# Patient Record
Sex: MALE | Race: BLACK OR AFRICAN AMERICAN | NOT HISPANIC OR LATINO | Employment: OTHER | URBAN - METROPOLITAN AREA
[De-identification: names, ages, dates, MRNs, and addresses within clinical notes are randomized per-mention and may not be internally consistent; named-entity substitution may affect disease eponyms.]

---

## 2017-03-24 PROBLEM — N18.9 ANEMIA OF CHRONIC RENAL FAILURE: Status: ACTIVE | Noted: 2017-03-24

## 2017-03-24 PROBLEM — D63.1 ANEMIA OF CHRONIC RENAL FAILURE: Status: ACTIVE | Noted: 2017-03-24

## 2017-09-05 PROBLEM — N52.9 ERECTILE DYSFUNCTION: Status: ACTIVE | Noted: 2017-09-05

## 2017-11-03 DIAGNOSIS — N18.30 CHRONIC KIDNEY DISEASE, STAGE III (MODERATE): ICD-10-CM

## 2017-11-03 RX ORDER — NATEGLINIDE 60 MG/1
60 TABLET ORAL DAILY
Qty: 30 TABLET | Refills: 6 | Status: CANCELLED | OUTPATIENT
Start: 2017-11-03

## 2017-12-06 ENCOUNTER — OFFICE VISIT (OUTPATIENT)
Dept: URGENT CARE | Facility: CLINIC | Age: 77
End: 2017-12-06
Payer: MEDICARE

## 2017-12-06 VITALS
DIASTOLIC BLOOD PRESSURE: 75 MMHG | OXYGEN SATURATION: 98 % | HEIGHT: 66 IN | WEIGHT: 158 LBS | TEMPERATURE: 97 F | BODY MASS INDEX: 25.39 KG/M2 | SYSTOLIC BLOOD PRESSURE: 151 MMHG | HEART RATE: 79 BPM

## 2017-12-06 DIAGNOSIS — M54.50 ACUTE LOW BACK PAIN WITHOUT SCIATICA, UNSPECIFIED BACK PAIN LATERALITY: Primary | ICD-10-CM

## 2017-12-06 PROCEDURE — 99204 OFFICE O/P NEW MOD 45 MIN: CPT | Mod: S$GLB,,, | Performed by: FAMILY MEDICINE

## 2017-12-06 RX ORDER — MOMETASONE FUROATE 1 MG/G
CREAM TOPICAL
Qty: 45 G | Refills: 0 | Status: SHIPPED | OUTPATIENT
Start: 2017-12-06 | End: 2018-08-13

## 2017-12-06 RX ORDER — NAPROXEN 375 MG/1
375 TABLET ORAL 2 TIMES DAILY WITH MEALS
Qty: 20 TABLET | Refills: 0 | Status: ON HOLD | OUTPATIENT
Start: 2017-12-06 | End: 2018-02-03 | Stop reason: HOSPADM

## 2017-12-06 RX ORDER — TRAMADOL HYDROCHLORIDE 50 MG/1
50 TABLET ORAL EVERY 6 HOURS PRN
Qty: 20 TABLET | Refills: 0 | Status: SHIPPED | OUTPATIENT
Start: 2017-12-06 | End: 2018-03-19

## 2017-12-06 RX ORDER — CHLORZOXAZONE 500 MG/1
500 TABLET ORAL 4 TIMES DAILY PRN
Qty: 40 TABLET | Refills: 0 | Status: SHIPPED | OUTPATIENT
Start: 2017-12-06 | End: 2017-12-16

## 2017-12-06 RX ORDER — CINACALCET 30 MG/1
30 TABLET, FILM COATED ORAL
COMMUNITY
End: 2018-02-09 | Stop reason: SDUPTHER

## 2017-12-06 NOTE — PROGRESS NOTES
"Subjective:       Patient ID: Margaret Hernández Jr. is a 77 y.o. male.    Vitals:  height is 5' 6" (1.676 m) and weight is 71.7 kg (158 lb). His oral temperature is 97.4 °F (36.3 °C). His blood pressure is 151/75 (abnormal) and his pulse is 79. His oxygen saturation is 98%.     Chief Complaint: Back Pain    Back Pain   This is a new problem. The current episode started 1 to 4 weeks ago. The problem occurs intermittently. The problem is unchanged. The pain is present in the lumbar spine. The quality of the pain is described as shooting. Radiates to: pt states his pain started in his RLQ and is now in his back. The pain is at a severity of 10/10. The symptoms are aggravated by position. Associated symptoms include abdominal pain and weakness. Pertinent negatives include no chest pain, fever, headaches, leg pain or numbness. He has tried nothing for the symptoms.   Rash   This is a new problem. The current episode started more than 1 month ago. The problem has been gradually worsening since onset. The affected locations include the back and right ankle. It is unknown if there was an exposure to a precipitant. Pertinent negatives include no cough, diarrhea, fatigue, fever, joint pain, shortness of breath, sore throat or vomiting. Past treatments include nothing.     Review of Systems   Constitution: Positive for weakness. Negative for chills, fatigue and fever.   HENT: Negative for sore throat.    Eyes: Negative for blurred vision.   Cardiovascular: Negative for chest pain.   Respiratory: Negative for cough and shortness of breath.    Skin: Positive for itching and rash.   Musculoskeletal: Positive for back pain. Negative for joint pain.   Gastrointestinal: Positive for abdominal pain. Negative for diarrhea, nausea and vomiting.   Neurological: Negative for headaches and numbness.   Psychiatric/Behavioral: The patient is not nervous/anxious.        Objective:      Physical Exam   Constitutional: He is oriented to person, " place, and time. Vital signs are normal. He appears well-developed and well-nourished. He is active and cooperative. No distress.   HENT:   Head: Normocephalic and atraumatic.   Nose: Nose normal.   Mouth/Throat: Oropharynx is clear and moist and mucous membranes are normal.   Eyes: Conjunctivae and lids are normal.   Neck: Trachea normal, normal range of motion, full passive range of motion without pain and phonation normal. Neck supple.   Cardiovascular: Normal rate, regular rhythm, normal heart sounds, intact distal pulses and normal pulses.    Pulmonary/Chest: Effort normal and breath sounds normal.   Abdominal: Soft. Normal appearance and bowel sounds are normal. He exhibits no abdominal bruit, no pulsatile midline mass and no mass.   Musculoskeletal: He exhibits no edema or deformity.        Lumbar back: He exhibits decreased range of motion, tenderness and pain.        Back:    Neurological: He is alert and oriented to person, place, and time. He has normal strength and normal reflexes. No sensory deficit.   Skin: Skin is warm, dry and intact. He is not diaphoretic.   Psychiatric: He has a normal mood and affect. His speech is normal and behavior is normal. Judgment and thought content normal. Cognition and memory are normal.   Nursing note and vitals reviewed.    Type of Interpretation: ED Physician (Independently Interpreted).  Radiology Procedure Done: Lumbar Spine X-Ray.  Interpretation: Normal LS films.      Assessment:       1. Acute low back pain without sciatica, unspecified back pain laterality        Plan:         Acute low back pain without sciatica, unspecified back pain laterality  -     X-Ray Lumbar Spine 2 Or 3 Views; Future; Expected date: 12/06/2017    Other orders  -     naproxen (NAPROSYN) 375 MG tablet; Take 1 tablet (375 mg total) by mouth 2 (two) times daily with meals.  Dispense: 20 tablet; Refill: 0  -     chlorzoxazone (PARAFON FORTE) 500 mg Tab; Take 1 tablet (500 mg total) by mouth  4 (four) times daily as needed.  Dispense: 40 tablet; Refill: 0  -     traMADol (ULTRAM) 50 mg tablet; Take 1 tablet (50 mg total) by mouth every 6 (six) hours as needed for Pain.  Dispense: 20 tablet; Refill: 0      Please drink plenty of fluids.  Please get plenty of rest.  Please return here or go to the Emergency Department for any concerns or worsening of condition.  If you were prescribed a narcotic medication, do not drive or operate heavy equipment or machinery while taking these medications.  If you were not prescribed an anti-inflammatory medication, and if you do not have any history of stomach/intestinal ulcers, or kidney disease, or are not taking a blood thinner such as Coumadin, Plavix, Pradaxa, Eloquis, or Xaralta for example, it is OK to take over the counter Ibuprofen or Advil or Motrin or Aleve as directed.  Do not take these medications on an empty stomach.  If you lose control of your bowel and/or bladder, please go to the nearest Emergency Department immediately.  If you lose sensation in between your legs by your genitalia and/or rectum, please go to the nearest Emergency Department immediately.  If you lose control or sensation of any extremity, please go to the nearest Emergency Department immediately.    Moist heat (heating Pad) several times a day to back for relief and comfort.  If you  smoke, please stop smoking.    Please follow up with your primary care doctor or specialist as needed.  Stephanie Hernandez NP  [phone]

## 2017-12-06 NOTE — PATIENT INSTRUCTIONS
Please drink plenty of fluids.  Please get plenty of rest.  Please return here or go to the Emergency Department for any concerns or worsening of condition.  If you were prescribed a narcotic medication, do not drive or operate heavy equipment or machinery while taking these medications.  If you were not prescribed an anti-inflammatory medication, and if you do not have any history of stomach/intestinal ulcers, or kidney disease, or are not taking a blood thinner such as Coumadin, Plavix, Pradaxa, Eloquis, or Xaralta for example, it is OK to take over the counter Ibuprofen or Advil or Motrin or Aleve as directed.  Do not take these medications on an empty stomach.  If you lose control of your bowel and/or bladder, please go to the nearest Emergency Department immediately.  If you lose sensation in between your legs by your genitalia and/or rectum, please go to the nearest Emergency Department immediately.  If you lose control or sensation of any extremity, please go to the nearest Emergency Department immediately.    Moist heat (heating Pad) several times a day to back for relief and comfort.  If you  smoke, please stop smoking.    Please follow up with your primary care doctor or specialist as needed.  Stephanie Hernandez, NP  [phone]      General Neck and Back Pain    Both neck and back pain are usually caused by injury to the muscles or ligaments of the spine. Sometimes the disks that separate each bone of the spine may cause pain by pressing on a nearby nerve. Back and neck pain may appear after a sudden twisting or bending force (such as in a car accident), or sometimes after a simple awkward movement. In either case, muscle spasm is often present and adds to the pain.  Acute neck and back pain usually gets better in 1 to 2 weeks. Pain related to disk disease, arthritis in the spinal joints or spinal stenosis (narrowing of the spinal canal) can become chronic and last for months or years.  Back and neck pain  are common problems. Most people feel better in 1 or 2 weeks, and most of the rest in 1 to 2 months. Most people can remain active.  People experience and describe pain differently.  · Pain can be sharp, stabbing, shooting, aching, cramping, or burning  · Movement, standing, bending, lifting, sitting, or walking may worsen the pain  · Pain can be localized to one spot or area, or it can be more generalized  · Pain can spread or radiate upwards, downwards, to the front, or go down your arms  · Muscle spasm may occur.  Most of the time mechanical problems with the muscles or spine cause the pain. it is usually caused by an injury, whether known or not, to the muscles or ligaments. While illnesses can cause back pain, it is usually not caused by a serious illness. Pain is usually related to physical activity, whether sports, exercise, work, or normal activity. Sometimes it can occur without an identifiable cause. This can happen simply by stretching or moving wrong, without noting pain at the time. Other causes include:  · Overexertion, lifting, pushing, pulling incorrectly or too aggressively.  · Sudden twisting, bending or stretching from an accident (car or fall), or accidental movement.  · Poor posture  · Poor conditioning, lack of regular exercise  · Spinal disc disease or arthritis  · Stress  · Pregnancy, or illness like appendicitis, bladder or kidney infection, pelvic infections   Home care  · For neck pain: Use a comfortable pillow that supports the head and keeps the spine in a neutral position. The position of the head should not be tilted forward or backward.  · When in bed, try to find a position of comfort. A firm mattress is best. Try lying flat on your back with pillows under your knees. You can also try lying on your side with your knees bent up towards your chest and a pillow between your knees.  · At first, do not try to stretch out the sore spots. If there is a strain, it is not like the good  soreness you get after exercising without an injury. In this case, stretching may make it worse.  · Avoid prolonged sitting, long car rides or travel. This puts more stress on the lower back than standing or walking.  · During the first 24 to 72 hours after an injury, apply an ice pack to the painful area for 20 minutes and then remove it for 20 minutes over a period of 60 to 90 minutes or several times a day.   · You can alternate ice and heat therapies. Talk with your healthcare provider about the best treatment for your back or neck pain. As a safety precaution, do not use a heating pad at bedtime. Sleeping with a heating pad can lead to skin burns or tissue damage.  · Therapeutic massage can help relax the back and neck muscles without stretching them.  · Be aware of safe lifting methods and do not lift anything over 15 pounds until all the pain is gone.  Medications  Talk to your healthcare provider before using medicine, especially if you have other medical problems or are taking other medicines.  · You may use over-the-counter medicine to control pain, unless another pain medicine was prescribed. If you have chronic conditions like diabetes, liver or kidney disease, stomach ulcers,  gastrointestinal bleeding, or are taking blood thinner medicines.  · Be careful if you are given pain medicines, narcotics, or medicine for muscle spasm. They can cause drowsiness, and can affect your coordination, reflexes, and judgment. Do not drive or operate heavy machinery.  Follow-up care  Follow up with your healthcare provider, or as advised. Physical therapy or further tests may be needed.  If X-rays were taken, you will be notified of any new findings that may affect your care.  Call 911  Seek emergency medical care if any of the following occur:  · Trouble breathing  · Confusion  · Very drowsy or trouble awakening  · Fainting or loss of consciousness  · Rapid or very slow heart rate  · Loss of bowel or bladder  control  When to seek medical advice  Call your healthcare provider right away if any of these occur:  · Pain becomes worse or spreads into your arms or legs  · Weakness, numbness or pain in one or both arms or legs  · Numbness in the groin area  · Difficulty walking  · Fever of 100.4ºF (38ºC) or higher, or as directed by your healthcare provider  Date Last Reviewed: 7/1/2016 © 2000-2016 [employer]. [address] [place] [address], [place] [address]. All rights reserved. This information is not intended as a substitute for professional medical care. Always follow your healthcare professional's instructions.      Back Pain (Acute or Chronic)    Back pain is one of the most common problems. The good news is that most people feel better in 1 to 2 weeks, and most of the rest in 1 to 2 months. Most people can remain active.  People experience and describe pain differently; not everyone is the same.  · The pain can be sharp, stabbing, shooting, aching, cramping or burning.  · Movement, standing, bending, lifting, sitting, or walking may worsen pain.  · It can be localized to one spot or area, or it can be more generalized.  · It can spread or radiate upwards, to the front, or go down your arms or legs (sciatica).  · It can cause muscle spasm.  Most of the time, mechanical problems with the muscles or spine cause the pain. Mechanical problems are usually caused by an injury to the muscles or ligaments. While illness can cause back pain, it is usually not caused by a serious illness. Mechanical problems include:   · Physical activity such as sports, exercise, work, or normal activity  · Overexertion, lifting, pushing, pulling incorrectly or too aggressively  · Sudden twisting, bending, or stretching from an accident, or accidental movement  · Poor posture  · Stretching or moving wrong, without noticing pain at the time  · Poor coordination, lack of regular exercise (check with your doctor about this)  · Spinal disc  disease or arthritis  · Stress  Pain can also be related to pregnancy, or illness like appendicitis, bladder or kidney infections, pelvic infections, and many other things.  Acute back pain usually gets better in 1 to 2 weeks. Back pain related to disk disease, arthritis in the spinal joints or spinal stenosis (narrowing of the spinal canal) can become chronic and last for months or years.  Unless you had a physical injury (for example, a car accident or fall) X-rays are usually not needed for the initial evaluation of back pain. If pain continues and does not respond to medical treatment, X-rays and other tests may be needed.  Home care  Try these home care recommendations:  · When in bed, try to find a position of comfort. A firm mattress is best. Try lying flat on your back with pillows under your knees. You can also try lying on your side with your knees bent up towards your chest and a pillow between your knees.  · At first, do not try to stretch out the sore spots. If there is a strain, it is not like the good soreness you get after exercising without an injury. In this case, stretching may make it worse.  · Avoid prolong sitting, long car rides, or travel. This puts more stress on the lower back than standing or walking.  · During the first 24 to 72 hours after an acute injury or flare up of chronic back pain, apply an ice pack to the painful area for 20 minutes and then remove it for 20 minutes. Do this over a period of 60 to 90 minutes or several times a day. This will reduce swelling and pain. Wrap the ice pack in a thin towel or plastic to protect your skin.  · You can start with ice, then switch to heat. Heat (hot shower, hot bath, or heating pad) reduces pain and works well for muscle spasms. Heat can be applied to the painful area for 20 minutes then remove it for 20 minutes. Do this over a period of 60 to 90 minutes or several times a day. Do not sleep on a heating pad. It can lead to skin burns or  tissue damage.  · You can alternate ice and heat therapy. Talk with your doctor about the best treatment for your back pain.  · Therapeutic massage can help relax the back muscles without stretching them.  · Be aware of safe lifting methods and do not lift anything without stretching first.  Medicines  Talk to your doctor before using medicine, especially if you have other medical problems or are taking other medicines.  · You may use over-the-counter medicine as directed on the bottle to control pain, unless another pain medicine was prescribed. If you have chronic conditions like diabetes, liver or kidney disease, stomach ulcers, or gastrointestinal bleeding, or are taking blood thinners, talk to your doctor before taking any medicine.  · Be careful if you are given a prescription medicines, narcotics, or medicine for muscle spasms. They can cause drowsiness, affect your coordination, reflexes, and judgement. Do not drive or operate heavy machinery.  Follow-up care  Follow up with your healthcare provider, or as advised.   A radiologist will review any X-rays that were taken. Your provide will notify you of any new findings that may affect your care.  Call 911  Call emergency services if any of the following occur:  · Trouble breathing  · Confusion  · Very drowsy or trouble awakening  · Fainting or loss of consciousness  · Rapid or very slow heart rate  · Loss of bowel or bladder control  When to seek medical advice  Call your healthcare provider right away if any of these occur:   · Pain becomes worse or spreads to your legs  · Weakness or numbness in one or both legs  · Numbness in the groin or genital area  Date Last Reviewed: 7/1/2016  © 8335-8107 [employer], [employer]. [address] [place] [address], [place] [address]. All rights reserved. This information is not intended as a substitute for professional medical care. Always follow your healthcare professional's instructions.      Back Care Tips    Caring for  your back  These are things you can do to prevent a recurrence of acute back pain and to reduce symptoms from chronic back pain:  · Maintain a healthy weight. If you are overweight, losing weight will help most types of back pain.  · Exercise is an important part of recovery from most types of back pain. The muscles behind and in front of the spine support the back. This means strengthening both the back muscles and the abdominal muscles will provide better support for your spine.   · Swimming and brisk walking are good overall exercises to improve your fitness level.  · Practice safe lifting methods (below).  · Practice good posture when sitting, standing and walking. Avoid prolonged sitting. This puts more stress on the lower back than standing or walking.  · Wear quality shoes with sufficient arch support. Foot and ankle alignment can affect back symptoms. Women should avoid wearing high heels.  · Therapeutic massage can help relax the back muscles without stretching them.  · During the first 24 to 72 hours after an acute injury or flare-up of chronic back pain, apply an ice pack to the painful area for 20 minutes and then remove it for 20 minutes, over a period of 60 to 90 minutes, or several times a day. As a safety precaution, do not use a heating pad at bedtime. Sleeping on a heating pad can lead to skin burns or tissue damage.  · You can alternate ice and heat therapies.  Medications  Talk to your healthcare provider before using medicines, especially if you have other medical problems or are taking other medicines.  · You may use acetaminophen or ibuprofen to control pain, unless your healthcare provider prescribed other pain medicine. If you have chronic conditions like diabetes, liver or kidney disease, stomach ulcers, or gastrointestinal bleeding, or are taking blood thinners, talk with your healthcare provider before taking any medicines.  · Be careful if you are given prescription pain medicines,  narcotics, or medicine for muscle spasm. They can cause drowsiness, affect your coordination, reflexes, and judgment. Do not drive or operate heavy machinery while taking these types of medicines. Take prescription pain medicine only as prescribed by your healthcare provider.  Lumbar stretch  Here is a simple stretching exercise that will help relax muscle spasm and keep your back more limber. If exercise makes your back pain worse, dont do it.  · Lie on your back with your knees bent and both feet on the ground.  · Slowly raise your left knee to your chest as you flatten your lower back against the floor. Hold for 5 seconds.  · Relax and repeat the exercise with your right knee.  · Do 10 of these exercises for each leg.  Safe lifting method  · Dont bend over at the waist to lift an object off the floor.  Instead, bend your knees and hips in a squat.   · Keep your back and head upright  · Hold the object close to your body, directly in front of you.  · Straighten your legs to lift the object.   · Lower the object to the floor in the reverse fashion.  · If you must slide something across the floor, push it.  Posture tips  Sitting  Sit in chairs with straight backs or low-back support. Keep your knees lower than your hips, with your feet flat on the floor.  When driving, sit up straight. Adjust the seat forward so you are not leaning toward the steering wheel.  A small pillow or rolled towel behind your lower back may help if you are driving long distances.   Standing  When standing for long periods, shift most of your weight to one leg at a time. Alternate legs every few minutes.   Sleeping  The best way to sleep is on your side with your knees bent. Put a low pillow under your head to support your neck in a neutral spine position. Avoid thick pillows that bend your neck to one side. Put a pillow between your legs to further relax your lower back. If you sleep on your back, put pillows under your knees to support  your legs in a slightly flexed position. Use a firm mattress. If your mattress sags, replace it, or use a 1/2-inch plywood board under the mattress to add support.  Follow-up care  Follow up with your healthcare provider, or as advised.  If X-rays, a CT scan or an MRI scan were taken, they will be reviewed by a radiologist. You will be notified of any new findings that may affect your care.  Call 911  Seek emergency medical care if any of the following occur:  · Trouble breathing  · Confusion  · Very drowsy  · Fainting or loss of consciousness  · Rapid or very slow heart rate  · Loss of  bowel or bladder control  When to seek medical care  Call your healthcare provider if any of the following occur:  · Pain becomes worse or spreads to your arms or legs  · Weakness or numbness in one or both arms or legs  · Numbness in the groin area  Date Last Reviewed: 6/1/2016  © 9653-9620 [employer], [employer]. [address] [place] [address], [place] [address]. All rights reserved. This information is not intended as a substitute for professional medical care. Always follow your healthcare professional's instructions.

## 2018-02-02 ENCOUNTER — OFFICE VISIT (OUTPATIENT)
Dept: URGENT CARE | Facility: CLINIC | Age: 78
End: 2018-02-02
Payer: MEDICARE

## 2018-02-02 VITALS
BODY MASS INDEX: 25.39 KG/M2 | WEIGHT: 158 LBS | HEIGHT: 66 IN | HEART RATE: 89 BPM | RESPIRATION RATE: 20 BRPM | TEMPERATURE: 98 F | DIASTOLIC BLOOD PRESSURE: 68 MMHG | SYSTOLIC BLOOD PRESSURE: 142 MMHG | OXYGEN SATURATION: 97 %

## 2018-02-02 DIAGNOSIS — R07.9 CHEST PAIN, UNSPECIFIED TYPE: Primary | ICD-10-CM

## 2018-02-02 PROBLEM — D63.1 ANEMIA OF CHRONIC RENAL FAILURE: Chronic | Status: ACTIVE | Noted: 2017-03-24

## 2018-02-02 PROBLEM — N18.9 ANEMIA OF CHRONIC RENAL FAILURE: Chronic | Status: ACTIVE | Noted: 2017-03-24

## 2018-02-02 PROCEDURE — 1159F MED LIST DOCD IN RCRD: CPT | Mod: S$GLB,,, | Performed by: FAMILY MEDICINE

## 2018-02-02 PROCEDURE — 99214 OFFICE O/P EST MOD 30 MIN: CPT | Mod: S$GLB,,, | Performed by: FAMILY MEDICINE

## 2018-02-02 RX ORDER — CHLORZOXAZONE 500 MG/1
500 TABLET ORAL 4 TIMES DAILY PRN
Status: ON HOLD | COMMUNITY
End: 2018-03-01 | Stop reason: HOSPADM

## 2018-02-02 RX ORDER — NAPROXEN SODIUM 220 MG/1
162 TABLET, FILM COATED ORAL ONCE
Status: DISCONTINUED | OUTPATIENT
Start: 2018-02-02 | End: 2018-02-02 | Stop reason: HOSPADM

## 2018-02-02 RX ADMIN — NAPROXEN SODIUM 162 MG: 220 TABLET, FILM COATED ORAL at 02:02

## 2018-02-02 NOTE — PROGRESS NOTES
"Subjective:       Patient ID: Margaret Hernández Jr. is a 77 y.o. male.    Vitals:  height is 5' 6" (1.676 m) and weight is 71.7 kg (158 lb). His oral temperature is 97.5 °F (36.4 °C). His blood pressure is 142/68 (abnormal) and his pulse is 89. His respiration is 20 and oxygen saturation is 97%.     Chief Complaint: Chest Pain    3 short stabbing episodes Left  chest pain lasting less than one minute, no SOB, no diaphoresis, no Nausea and vomiting.      Chest Pain    This is a new problem. The current episode started in the past 7 days. The onset quality is sudden. The problem occurs rarely. The problem has been unchanged. The pain is present in the lateral region. The pain is at a severity of 8/10. The pain is moderate. The quality of the pain is described as tightness. The pain does not radiate. Pertinent negatives include no abdominal pain, back pain, fever, headaches, nausea, shortness of breath or vomiting. The pain is aggravated by breathing and coughing. He has tried nothing for the symptoms.     Review of Systems   Constitution: Negative for chills and fever.   HENT: Negative for sore throat.    Eyes: Negative for blurred vision.   Cardiovascular: Positive for chest pain.   Respiratory: Negative for shortness of breath.    Skin: Negative for rash.   Musculoskeletal: Positive for neck pain. Negative for back pain and joint pain.   Gastrointestinal: Negative for abdominal pain, diarrhea, nausea and vomiting.   Neurological: Negative for headaches.   Psychiatric/Behavioral: The patient is not nervous/anxious.        Objective:      Physical Exam   Constitutional: He is oriented to person, place, and time. He appears well-developed and well-nourished. He is cooperative.  Non-toxic appearance. He does not appear ill. No distress.   HENT:   Head: Normocephalic and atraumatic.   Right Ear: Hearing, tympanic membrane, external ear and ear canal normal.   Left Ear: Hearing, tympanic membrane, external ear and ear canal " normal.   Nose: Nose normal. No mucosal edema, rhinorrhea or nasal deformity. No epistaxis. Right sinus exhibits no maxillary sinus tenderness and no frontal sinus tenderness. Left sinus exhibits no maxillary sinus tenderness and no frontal sinus tenderness.   Mouth/Throat: Uvula is midline, oropharynx is clear and moist and mucous membranes are normal. No trismus in the jaw. Normal dentition. No uvula swelling. No posterior oropharyngeal erythema.   Eyes: Conjunctivae and lids are normal. Right eye exhibits no discharge. Left eye exhibits no discharge. No scleral icterus.   Sclera clear bilat   Neck: Trachea normal, normal range of motion, full passive range of motion without pain and phonation normal. Neck supple.   Cardiovascular: Normal rate, regular rhythm, normal heart sounds, intact distal pulses and normal pulses.    Pulmonary/Chest: Effort normal and breath sounds normal. No respiratory distress.   Abdominal: Soft. Normal appearance and bowel sounds are normal. He exhibits no distension, no pulsatile midline mass and no mass. There is no tenderness.   Musculoskeletal: Normal range of motion. He exhibits no edema or deformity.   Neurological: He is alert and oriented to person, place, and time. He exhibits normal muscle tone. Coordination normal.   Skin: Skin is warm, dry and intact. He is not diaphoretic. No pallor.   Psychiatric: He has a normal mood and affect. His speech is normal and behavior is normal. Judgment and thought content normal. Cognition and memory are normal.   Nursing note and vitals reviewed.    EKG - NSR HR  80  No acute ST Changes, meets criteria for RVH.  Assessment:       1. Chest pain, unspecified type        Plan:         Chest pain, unspecified type  -     Refer to Emergency Dept.  -     EKG 12-lead    Other orders  -     aspirin chewable tablet 162 mg; Take 2 tablets (162 mg total) by mouth once.      YOUR CONDITION IS POTENTIALLY LIFE THREATENING.  GO TO NEAREST ER NOW FOR  FURTHER EVALUATION AND TREATMENT.    Patient was offered transfer by [facility].  Patient declines ambulance transport and will go by private auto.  The risks of this decision were explained to patient.    Patient with chest pain and multiple risk factors for CAD.  Offered and  Strongly recommended [facility] ambulance transfer.  Patient refuses, will go by private auto.

## 2018-02-02 NOTE — PATIENT INSTRUCTIONS
YOUR CONDITION IS POTENTIALLY LIFE THREATENING.  GO TO ER NOW FOR FURTHER EVALUATION AND TREATMENT.    You were offered transfer by ambulance.  You have declined ambulance transport and agree to go to nearest ER by private auto.  The risks of this decision were explained to you.      Uncertain Causes of Chest Pain    Chest pain can happen for a number of reasons. Sometimes the cause can't be determined. If your condition does not seem serious, and your pain does not appear to be coming from your heart, your healthcare provider may recommend watching it closely. Sometimes the signs of a serious problem take more time to appear. Many problems not related to your heart can cause chest pain.These include:  · Musculoskeletal. Costochondritis, an inflammation of the tissues around the ribs that can occur from trauma or overuse injuries  · Respiratory. Pneumonia, pneumothorax, or pneumonitis (inflammation of the lining of the chest and lungs)  · Gastrointestinal. Esophageal reflux, heartburn, or gallbladder disease  · Anxiety and panic disorders  · Nerve compression and neuritis  · Miscellaneous problems such as aortic aneurysm or pulmonary embolism (a blood clot in the lungs)  Home care  After your visit, follow these recommendations:  · Rest today and avoid strenuous activity.  · Take any prescribed medicine as directed.  · Be aware of any recurrent chest pain and notice any changes  Follow-up care  Follow up with your healthcare provider if you do not start to feel better within 24 hours, or as advised.  Call 911  Call 911 if any of these occur:  · A change in the type of pain: if it feels different, becomes more severe, lasts longer, or begins to spread into your shoulder, arm, neck, jaw or back  · Shortness of breath or increased pain with breathing  · Weakness, dizziness, or fainting  · Rapid heart beat  · Crushing sensation in your chest  When to seek medical advice  Call your healthcare provider right away if any  of the following occur:  · Cough with dark colored sputum (phlegm) or blood  · Fever of 100.4ºF (38ºC) or higher, or as directed by your healthcare provider  · Swelling, pain or redness in one leg  · Shortness of breath  Date Last Reviewed: 12/30/2015  © 5723-3970 [employer]. [address] [place] [address]. All rights reserved. This information is not intended as a substitute for professional medical care. Always follow your healthcare professional's instructions.

## 2018-02-07 PROBLEM — Z91.199 CURRENT NONADHERENCE TO MEDICAL TREATMENT: Chronic | Status: ACTIVE | Noted: 2018-02-07

## 2018-02-19 PROBLEM — N19 UREMIA: Status: ACTIVE | Noted: 2018-02-19

## 2018-02-20 PROBLEM — E87.29 HIGH ANION GAP METABOLIC ACIDOSIS: Status: ACTIVE | Noted: 2018-02-20

## 2018-02-21 PROBLEM — E87.29 HIGH ANION GAP METABOLIC ACIDOSIS: Status: ACTIVE | Noted: 2018-02-21

## 2018-02-26 DIAGNOSIS — Z99.2 ESRD ON DIALYSIS: Primary | ICD-10-CM

## 2018-02-26 DIAGNOSIS — N18.6 ESRD ON DIALYSIS: Primary | ICD-10-CM

## 2018-02-27 PROBLEM — D64.9 ANEMIA: Status: ACTIVE | Noted: 2018-02-27

## 2018-03-13 PROBLEM — N52.9 ERECTILE DYSFUNCTION: Status: RESOLVED | Noted: 2017-09-05 | Resolved: 2018-03-13

## 2018-03-19 PROBLEM — R07.9 CHEST PAIN: Chronic | Status: RESOLVED | Noted: 2018-02-02 | Resolved: 2018-03-19

## 2018-04-03 DIAGNOSIS — N18.6 ESRD ON DIALYSIS: Primary | ICD-10-CM

## 2018-04-03 DIAGNOSIS — Z99.2 ESRD ON DIALYSIS: Primary | ICD-10-CM

## 2018-04-03 RX ORDER — MUPIROCIN 20 MG/G
OINTMENT TOPICAL
Status: CANCELLED | OUTPATIENT
Start: 2018-04-03

## 2018-04-03 RX ORDER — LIDOCAINE HYDROCHLORIDE 10 MG/ML
1 INJECTION, SOLUTION EPIDURAL; INFILTRATION; INTRACAUDAL; PERINEURAL ONCE
Status: CANCELLED | OUTPATIENT
Start: 2018-04-03 | End: 2018-04-03

## 2018-04-09 PROBLEM — Z99.2 ESRD ON DIALYSIS: Status: ACTIVE | Noted: 2018-04-09

## 2018-04-09 PROBLEM — N18.6 ESRD ON DIALYSIS: Status: ACTIVE | Noted: 2018-04-09

## 2018-08-18 ENCOUNTER — OFFICE VISIT (OUTPATIENT)
Dept: URGENT CARE | Facility: CLINIC | Age: 78
End: 2018-08-18
Payer: MEDICARE

## 2018-08-18 VITALS
BODY MASS INDEX: 25.07 KG/M2 | SYSTOLIC BLOOD PRESSURE: 155 MMHG | TEMPERATURE: 98 F | WEIGHT: 156 LBS | OXYGEN SATURATION: 97 % | HEART RATE: 88 BPM | HEIGHT: 66 IN | DIASTOLIC BLOOD PRESSURE: 78 MMHG

## 2018-08-18 DIAGNOSIS — L03.114 CELLULITIS OF LEFT UPPER EXTREMITY: Primary | ICD-10-CM

## 2018-08-18 PROCEDURE — 99214 OFFICE O/P EST MOD 30 MIN: CPT | Mod: S$GLB,,, | Performed by: PHYSICIAN ASSISTANT

## 2018-08-18 RX ORDER — HYDROXYZINE PAMOATE 25 MG/1
25 CAPSULE ORAL EVERY 8 HOURS PRN
Qty: 30 CAPSULE | Refills: 0 | Status: SHIPPED | OUTPATIENT
Start: 2018-08-18 | End: 2018-08-28

## 2018-08-18 RX ORDER — MOMETASONE FUROATE 1 MG/G
CREAM TOPICAL DAILY
Qty: 45 G | Refills: 0 | Status: SHIPPED | OUTPATIENT
Start: 2018-08-18 | End: 2019-04-23 | Stop reason: ALTCHOICE

## 2018-08-18 NOTE — PROGRESS NOTES
"Subjective:       Patient ID: Margaret Hernández Jr. is a 78 y.o. male.    Vitals:  height is 5' 6" (1.676 m) and weight is 70.8 kg (156 lb). His temperature is 98.1 °F (36.7 °C). His blood pressure is 155/78 (abnormal) and his pulse is 88. His oxygen saturation is 97%.     Chief Complaint: Rash    Pt is currently on clindamycin regimen. He is inquiring about itch relief.      Rash   This is a new problem. Episode onset: 2 weeks. The problem has been gradually worsening since onset. The affected locations include the back and left arm. The rash is characterized by redness and itchiness. It is unknown if there was an exposure to a precipitant. Pertinent negatives include no fever, joint pain, shortness of breath or sore throat. Past treatments include antihistamine. The treatment provided no relief.     Review of Systems   Constitution: Negative for chills and fever.   HENT: Negative for sore throat.    Respiratory: Negative for shortness of breath.    Skin: Positive for itching and rash.   Musculoskeletal: Negative for joint pain.       Objective:      Physical Exam   Constitutional: He is oriented to person, place, and time. He appears well-developed and well-nourished.   HENT:   Head: Normocephalic and atraumatic. Head is without abrasion, without contusion and without laceration.   Right Ear: External ear normal.   Left Ear: External ear normal.   Nose: Nose normal.   Mouth/Throat: Oropharynx is clear and moist.   Eyes: Conjunctivae, EOM and lids are normal. Pupils are equal, round, and reactive to light.   Neck: Trachea normal, full passive range of motion without pain and phonation normal. Neck supple.   Cardiovascular: Normal rate, regular rhythm and normal heart sounds.   Pulmonary/Chest: Effort normal and breath sounds normal. No stridor. No respiratory distress.   Musculoskeletal: Normal range of motion.   Neurological: He is alert and oriented to person, place, and time.   Skin: Skin is warm, dry and intact. " Capillary refill takes less than 2 seconds. No abrasion, no bruising, no burn, no ecchymosis, no laceration, no lesion and no rash noted. There is erythema.        Area of erythema and mild swelling overlying the L upper arm. There is some dryness/skin flaking associated. No streaking erythema, fluctuance, or drainage.   Psychiatric: He has a normal mood and affect. His speech is normal and behavior is normal. Judgment and thought content normal. Cognition and memory are normal.   Nursing note and vitals reviewed.      Assessment:       1. Cellulitis of left upper extremity        Plan:       - Pt instructed to continue clindamycin until completion and follow up if symptoms not resolved. Warning signs and symptoms discussed that might warrant an ED visit. Pt v/u all education and instruction. Discharged in stable condition.    Cellulitis of left upper extremity  -     mometasone 0.1% (ELOCON) 0.1 % cream; Apply topically once daily. for 10 days  Dispense: 45 g; Refill: 0  -     hydrOXYzine pamoate (VISTARIL) 25 MG Cap; Take 1 capsule (25 mg total) by mouth every 8 (eight) hours as needed.  Dispense: 30 capsule; Refill: 0      Patient Instructions     · Follow up with your primary care in 2-5 days if symptoms have not improved, or you may return here.  · If you were referred to a specialist, please follow up with that specialty.  · If you were prescribed antibiotics, please take them to completion.  · If you were prescribed a narcotic or any medication with sedative effects, do not drive or operate heavy equipment or machinery while taking these medications.  · You must understand that you have received treatment at an Urgent Care facility only, and that you may be released before all of your medical problems are known or treated. Urgent Care facilities are not equipped to handle life threatening emergencies. It is recommended that you go to an Emergency Department for further evaluation of worsening or concerning  symptoms, or possibly life threatening conditions as discussed.                                        If you  smoke, please stop smoking        Discharge Instructions for Cellulitis  You have been diagnosed with cellulitis. This is an infection in the deepest layer of the skin. In some cases, the infection also affects the muscle. Cellulitis is caused by bacteria. The bacteria can enter the body through broken skin. This can happen with a cut, scratch, animal bite, or an insect bite that has been scratched. You may have been treated in the hospital with antibiotics and fluids. You will likely be given a prescription for antibiotics to take at home. This sheet will help you take care of yourself at home.  Home care  When you are home:  · Take the prescribed antibiotic medicine you are given as directed until it is gone. Take it even if you feel better. It treats the infection and stops it from returning. Not taking all the medicine can make future infections hard to treat.  · Keep the infected area clean.  · When possible, raise the infected area above the level of your heart. This helps keep swelling down.  · Talk with your healthcare provider if you are in pain. Ask what kind of over-the-counter medicine you can take for pain.  · Apply clean bandages as advised.  · Take your temperature once a day for a week.  · Wash your hands often to prevent spreading the infection.  In the future, wash your hands before and after you touch cuts, scratches, or bandages. This will help prevent infection.   When to call your healthcare provider  Call your healthcare provider immediately if you have any of the following:  · Difficulty or pain when moving the joints above or below the infected area  · Discharge or pus draining from the area  · Fever of 100.4°F (38°C) or higher, or as directed by your healthcare provider  · Pain that gets worse in or around the infected   · Redness that gets worse in or around the infected  area, particularly if the area of redness expands to a wider area  · Shaking chills  · Swelling of the infected area  · Vomiting   Date Last Reviewed: 8/1/2016  © 4372-3916 [employer], [employer]. [address] [place] [address], [place] [address]. All rights reserved. This information is not intended as a substitute for professional medical care. Always follow your healthcare professional's instructions.

## 2018-08-18 NOTE — PATIENT INSTRUCTIONS
· Follow up with your primary care in 2-5 days if symptoms have not improved, or you may return here.  · If you were referred to a specialist, please follow up with that specialty.  · If you were prescribed antibiotics, please take them to completion.  · If you were prescribed a narcotic or any medication with sedative effects, do not drive or operate heavy equipment or machinery while taking these medications.  · You must understand that you have received treatment at an Urgent Care facility only, and that you may be released before all of your medical problems are known or treated. Urgent Care facilities are not equipped to handle life threatening emergencies. It is recommended that you go to an Emergency Department for further evaluation of worsening or concerning symptoms, or possibly life threatening conditions as discussed.                                        If you  smoke, please stop smoking        Discharge Instructions for Cellulitis  You have been diagnosed with cellulitis. This is an infection in the deepest layer of the skin. In some cases, the infection also affects the muscle. Cellulitis is caused by bacteria. The bacteria can enter the body through broken skin. This can happen with a cut, scratch, animal bite, or an insect bite that has been scratched. You may have been treated in the hospital with antibiotics and fluids. You will likely be given a prescription for antibiotics to take at home. This sheet will help you take care of yourself at home.  Home care  When you are home:  · Take the prescribed antibiotic medicine you are given as directed until it is gone. Take it even if you feel better. It treats the infection and stops it from returning. Not taking all the medicine can make future infections hard to treat.  · Keep the infected area clean.  · When possible, raise the infected area above the level of your heart. This helps keep swelling down.  · Talk with your healthcare provider if you  are in pain. Ask what kind of over-the-counter medicine you can take for pain.  · Apply clean bandages as advised.  · Take your temperature once a day for a week.  · Wash your hands often to prevent spreading the infection.  In the future, wash your hands before and after you touch cuts, scratches, or bandages. This will help prevent infection.   When to call your healthcare provider  Call your healthcare provider immediately if you have any of the following:  · Difficulty or pain when moving the joints above or below the infected area  · Discharge or pus draining from the area  · Fever of 100.4°F (38°C) or higher, or as directed by your healthcare provider  · Pain that gets worse in or around the infected   · Redness that gets worse in or around the infected area, particularly if the area of redness expands to a wider area  · Shaking chills  · Swelling of the infected area  · Vomiting   Date Last Reviewed: 8/1/2016  © 3638-8437 [employer], [email or web address] [address] [place] [address], [place] [address]. All rights reserved. This information is not intended as a substitute for professional medical care. Always follow your healthcare professional's instructions.

## 2019-04-22 PROCEDURE — 88312 SPECIAL STAINS GROUP 1: CPT | Performed by: PATHOLOGY

## 2019-04-22 PROCEDURE — 88342 IMHCHEM/IMCYTCHM 1ST ANTB: CPT | Mod: 59 | Performed by: PATHOLOGY

## 2019-05-21 ENCOUNTER — OFFICE VISIT (OUTPATIENT)
Dept: URGENT CARE | Facility: CLINIC | Age: 79
End: 2019-05-21
Payer: MEDICARE

## 2019-05-21 VITALS
OXYGEN SATURATION: 96 % | HEART RATE: 74 BPM | TEMPERATURE: 98 F | BODY MASS INDEX: 24.59 KG/M2 | WEIGHT: 153 LBS | RESPIRATION RATE: 18 BRPM | SYSTOLIC BLOOD PRESSURE: 150 MMHG | DIASTOLIC BLOOD PRESSURE: 89 MMHG | HEIGHT: 66 IN

## 2019-05-21 DIAGNOSIS — R07.9 NONSPECIFIC CHEST PAIN: ICD-10-CM

## 2019-05-21 DIAGNOSIS — R07.9 CHEST PAIN, UNSPECIFIED TYPE: Primary | ICD-10-CM

## 2019-05-21 PROCEDURE — 99204 PR OFFICE/OUTPT VISIT, NEW, LEVL IV, 45-59 MIN: ICD-10-PCS | Mod: S$GLB,TXP,, | Performed by: NURSE PRACTITIONER

## 2019-05-21 PROCEDURE — 99204 OFFICE O/P NEW MOD 45 MIN: CPT | Mod: S$GLB,TXP,, | Performed by: NURSE PRACTITIONER

## 2019-05-21 RX ORDER — LIDOCAINE AND PRILOCAINE 25; 25 MG/G; MG/G
CREAM TOPICAL
Refills: 99 | COMMUNITY
Start: 2019-04-11

## 2019-05-21 RX ORDER — CALCIUM ACETATE 667 MG/1
TABLET ORAL
Refills: 4 | COMMUNITY
Start: 2019-02-20 | End: 2019-06-21 | Stop reason: ALTCHOICE

## 2019-05-21 NOTE — PATIENT INSTRUCTIONS
Gp to Emergency Department for Further Evalaution          Uncertain Causes of Chest Pain    Chest pain can happen for a number of reasons. Sometimes the cause can't be determined. If your condition does not seem serious, and your pain does not appear to be coming from your heart, your healthcare provider may recommend watching it closely. Sometimes the signs of a serious problem take more time to appear. Many problems not related to your heart can cause chest pain.These include:  · Musculoskeletal. Costochondritis, an inflammation of the tissues around the ribs that can occur from trauma or overuse injuries  · Respiratory. Pneumonia, pneumothorax, or pneumonitis (inflammation of the lining of the chest and lungs)  · Gastrointestinal. Esophageal reflux, heartburn, or gallbladder disease  · Anxiety and panic disorders  · Nerve compression and neuritis  · Miscellaneous problems such as aortic aneurysm or pulmonary embolism (a blood clot in the lungs)  Home care  After your visit, follow these recommendations:  · Rest today and avoid strenuous activity.  · Take any prescribed medicine as directed.  · Be aware of any recurrent chest pain and notice any changes  Follow-up care  Follow up with your healthcare provider if you do not start to feel better within 24 hours, or as advised.  Call 911  Call 911 if any of these occur:  · A change in the type of pain: if it feels different, becomes more severe, lasts longer, or begins to spread into your shoulder, arm, neck, jaw or back  · Shortness of breath or increased pain with breathing  · Weakness, dizziness, or fainting  · Rapid heart beat  · Crushing sensation in your chest  When to seek medical advice  Call your healthcare provider right away if any of the following occur:  · Cough with dark colored sputum (phlegm) or blood  · Fever of 100.4ºF (38ºC) or higher, or as directed by your healthcare provider  · Swelling, pain or redness in one leg  · Shortness of breath  Date  Last Reviewed: 12/30/2015  © 5428-2527 [employer], [employer]. [address] [place] [address], [place] [address]. All rights reserved. This information is not intended as a substitute for professional medical care. Always follow your healthcare professional's instructions.

## 2019-05-21 NOTE — PROGRESS NOTES
"Subjective:       Patient ID: Margaret Hernández Jr. is a 79 y.o. male.    Vitals:  height is 5' 6" (1.676 m) and weight is 69.4 kg (153 lb). His oral temperature is 97.8 °F (36.6 °C). His blood pressure is 150/89 (abnormal) and his pulse is 74. His respiration is 18 and oxygen saturation is 96%.     Chief Complaint: Chest Pain    Chest Pain    This is a new problem. Episode onset: 4 days ago. The onset quality is sudden. The problem occurs constantly. The problem has been gradually worsening. The pain is present in the epigastric region. The pain is at a severity of 5/10. The pain is mild. The quality of the pain is described as pressure. The pain does not radiate. Associated symptoms include headaches and orthopnea. Pertinent negatives include no abdominal pain, back pain, claudication, cough, diaphoresis, dizziness, exertional chest pressure, fever, hemoptysis, irregular heartbeat, leg pain, lower extremity edema, malaise/fatigue, nausea, near-syncope, numbness, palpitations, PND, shortness of breath, sputum production, syncope, vomiting or weakness. Risk factors include being elderly and male gender.   His past medical history is significant for diabetes, hyperlipidemia and hypertension.   Pertinent negatives for past medical history include no arrhythmia, no cancer, no COPD, no CHF, no MI, no pacemaker, no recent injury, no seizures, no sickle cell disease, no sleep apnea, no spontaneous pneumothorax, no stimulant use, no strokes, no thyroid problem, no TIA, Garcia syndrome and no valve disorder.   His family medical history is significant for diabetes, heart disease and hypertension.   Pertinent negatives for family medical history include: no CAD, no hyperlipidemia, no early MI, no PE, no PVD, no sickle cell disease, no stroke and no sudden death.       Constitution: Negative for chills, sweating, fatigue and fever.   HENT: Negative for trouble swallowing.    Neck: Negative for neck pain.   Cardiovascular: " Positive for chest pain. Negative for chest trauma, leg swelling, palpitations and passing out.   Respiratory: Negative for sleep apnea, cough, sputum production, bloody sputum, COPD and shortness of breath.    Gastrointestinal: Negative for abdominal pain, nausea, vomiting and heartburn.   Musculoskeletal: Negative for back pain and pain with walking.   Skin: Negative for rash.   Neurological: Positive for headaches. Negative for dizziness, light-headedness, passing out, numbness and seizures.   Hematologic/Lymphatic: Negative for history of blood clots.   Psychiatric/Behavioral: Negative for nervous/anxious. The patient is not nervous/anxious.        Objective:      Physical Exam    Assessment:       1. Nonspecific chest pain        Plan:       EKG Normal sinus rhythm - 71  No ectopy  Nonspecific chest pain          Patient Instructions     Gp to Emergency Department for Further Evalaution          Uncertain Causes of Chest Pain    Chest pain can happen for a number of reasons. Sometimes the cause can't be determined. If your condition does not seem serious, and your pain does not appear to be coming from your heart, your healthcare provider may recommend watching it closely. Sometimes the signs of a serious problem take more time to appear. Many problems not related to your heart can cause chest pain.These include:  · Musculoskeletal. Costochondritis, an inflammation of the tissues around the ribs that can occur from trauma or overuse injuries  · Respiratory. Pneumonia, pneumothorax, or pneumonitis (inflammation of the lining of the chest and lungs)  · Gastrointestinal. Esophageal reflux, heartburn, or gallbladder disease  · Anxiety and panic disorders  · Nerve compression and neuritis  · Miscellaneous problems such as aortic aneurysm or pulmonary embolism (a blood clot in the lungs)  Home care  After your visit, follow these recommendations:  · Rest today and avoid strenuous activity.  · Take any prescribed  medicine as directed.  · Be aware of any recurrent chest pain and notice any changes  Follow-up care  Follow up with your healthcare provider if you do not start to feel better within 24 hours, or as advised.  Call 911  Call 911 if any of these occur:  · A change in the type of pain: if it feels different, becomes more severe, lasts longer, or begins to spread into your shoulder, arm, neck, jaw or back  · Shortness of breath or increased pain with breathing  · Weakness, dizziness, or fainting  · Rapid heart beat  · Crushing sensation in your chest  When to seek medical advice  Call your healthcare provider right away if any of the following occur:  · Cough with dark colored sputum (phlegm) or blood  · Fever of 100.4ºF (38ºC) or higher, or as directed by your healthcare provider  · Swelling, pain or redness in one leg  · Shortness of breath  Date Last Reviewed: 12/30/2015  © 3094-7868 [email or web address]. [address] [place] [address], [place] [address]. All rights reserved. This information is not intended as a substitute for professional medical care. Always follow your healthcare professional's instructions.          Patient refused Ambulance transportation to ED- discuss ED for further evaluation in detail .

## 2019-06-29 ENCOUNTER — DOCUMENTATION ONLY (OUTPATIENT)
Dept: TRANSPLANT | Facility: CLINIC | Age: 79
End: 2019-06-29

## 2019-07-10 DIAGNOSIS — Z99.2 ESRD ON DIALYSIS: ICD-10-CM

## 2019-07-10 DIAGNOSIS — N18.6 ESRD ON DIALYSIS: ICD-10-CM

## 2019-07-10 RX ORDER — CALCIUM ACETATE 667 MG/1
TABLET ORAL
Qty: 90 TABLET | Refills: 4 | Status: SHIPPED | OUTPATIENT
Start: 2019-07-10 | End: 2020-01-21

## 2020-01-14 DIAGNOSIS — T82.858A: ICD-10-CM

## 2020-01-14 PROBLEM — N18.6 ESRD (END STAGE RENAL DISEASE): Status: ACTIVE | Noted: 2020-01-14

## 2020-01-14 RX ORDER — LIDOCAINE HYDROCHLORIDE 10 MG/ML
1 INJECTION, SOLUTION EPIDURAL; INFILTRATION; INTRACAUDAL; PERINEURAL ONCE
Status: DISCONTINUED | OUTPATIENT
Start: 2020-01-14 | End: 2020-01-14 | Stop reason: HOSPADM

## 2020-01-14 RX ORDER — SODIUM CHLORIDE 9 MG/ML
INJECTION, SOLUTION INTRAVENOUS CONTINUOUS
Status: CANCELLED | OUTPATIENT
Start: 2020-01-14

## 2020-01-27 PROBLEM — T82.858A ARTERIOVENOUS GRAFT STENOSIS: Status: ACTIVE | Noted: 2020-01-27

## 2020-03-03 ENCOUNTER — TELEPHONE (OUTPATIENT)
Dept: VASCULAR SURGERY | Facility: CLINIC | Age: 80
End: 2020-03-03

## 2020-03-03 NOTE — TELEPHONE ENCOUNTER
----- Message from Cecilia Foster sent at 3/3/2020  4:03 PM CST -----  Contact: Self  Mr. Robles called with questions regarding his surgery.  He left a voicemail.  I called him back to confirm his name and .  Please call him at [phone].    Thanks, Cecilia

## 2020-06-16 PROBLEM — T82.858D AV GRAFT STENOSIS, SUBSEQUENT ENCOUNTER: Status: ACTIVE | Noted: 2020-06-16

## 2020-06-29 ENCOUNTER — NURSE TRIAGE (OUTPATIENT)
Dept: ADMINISTRATIVE | Facility: CLINIC | Age: 80
End: 2020-06-29

## 2020-06-29 NOTE — TELEPHONE ENCOUNTER
Attempted to contact pt on behalf of Post Procedural Symptom Tracker. No answer day 13. Symptom tracker RN will call pt back tomorrow.    Reason for Disposition   No answer.  First attempt to contact caller.  Follow-up call scheduled within 15 minutes.    Protocols used: NO CONTACT OR DUPLICATE CONTACT CALL-A-[place]

## 2020-06-30 NOTE — TELEPHONE ENCOUNTER
Contacted pt on behalf of Post Procedural Symptom Tracker. Pt refused to verify . Pt denies any fever, cough, or difficulty breathing since procedure. Advised pt if these symptoms do arise to contact OOC or PCP. No follow up needed.    Reason for Disposition   Health Information question, no triage required and triager able to answer question    Protocols used: INFORMATION ONLY CALL - NO TRIAGE-A-

## 2020-12-07 ENCOUNTER — OFFICE VISIT (OUTPATIENT)
Dept: URGENT CARE | Facility: CLINIC | Age: 80
End: 2020-12-07
Payer: MEDICARE

## 2020-12-07 VITALS
TEMPERATURE: 99 F | HEART RATE: 97 BPM | BODY MASS INDEX: 23.73 KG/M2 | WEIGHT: 147 LBS | DIASTOLIC BLOOD PRESSURE: 71 MMHG | SYSTOLIC BLOOD PRESSURE: 146 MMHG | OXYGEN SATURATION: 95 %

## 2020-12-07 DIAGNOSIS — S43.401A SPRAIN OF RIGHT SHOULDER, UNSPECIFIED SHOULDER SPRAIN TYPE, INITIAL ENCOUNTER: Primary | ICD-10-CM

## 2020-12-07 PROCEDURE — 73030 X-RAY EXAM OF SHOULDER: CPT | Mod: RT,S$GLB,, | Performed by: RADIOLOGY

## 2020-12-07 PROCEDURE — 96372 PR INJECTION,THERAP/PROPH/DIAG2ST, IM OR SUBCUT: ICD-10-PCS | Mod: S$GLB,,, | Performed by: FAMILY MEDICINE

## 2020-12-07 PROCEDURE — 96372 THER/PROPH/DIAG INJ SC/IM: CPT | Mod: S$GLB,,, | Performed by: FAMILY MEDICINE

## 2020-12-07 PROCEDURE — 99214 PR OFFICE/OUTPT VISIT, EST, LEVL IV, 30-39 MIN: ICD-10-PCS | Mod: 25,S$GLB,, | Performed by: FAMILY MEDICINE

## 2020-12-07 PROCEDURE — 99214 OFFICE O/P EST MOD 30 MIN: CPT | Mod: 25,S$GLB,, | Performed by: FAMILY MEDICINE

## 2020-12-07 PROCEDURE — 73030 XR SHOULDER COMPLETE 2 OR MORE VIEWS RIGHT: ICD-10-PCS | Mod: RT,S$GLB,, | Performed by: RADIOLOGY

## 2020-12-07 RX ORDER — KETOROLAC TROMETHAMINE 30 MG/ML
30 INJECTION, SOLUTION INTRAMUSCULAR; INTRAVENOUS ONCE
Status: COMPLETED | OUTPATIENT
Start: 2020-12-07 | End: 2020-12-07

## 2020-12-07 RX ADMIN — KETOROLAC TROMETHAMINE 30 MG: 30 INJECTION, SOLUTION INTRAMUSCULAR; INTRAVENOUS at 01:12

## 2020-12-07 NOTE — PROGRESS NOTES
Subjective:       Patient ID: Margaret Hernández Jr. is a 80 y.o. male.    Vitals:  weight is 66.7 kg (147 lb). His oral temperature is 99.1 °F (37.3 °C). His blood pressure is 146/71 (abnormal) and his pulse is 97. His oxygen saturation is 95%.     Chief Complaint: Arm Pain    Pt states he was pushing a water heater when his right shoulder area started hurting.    Arm Pain   Incident onset: 3 days ago. The incident occurred at home. Injury mechanism: pushing heavy object. The pain is present in the right shoulder. The quality of the pain is described as aching. The pain does not radiate. The pain is at a severity of 10/10. The pain is severe. The pain has been constant since the incident. Pertinent negatives include no numbness or tingling. Treatments tried: tylenol. The treatment provided mild relief.       Constitution: Negative for fatigue.   HENT: Negative for facial swelling and facial trauma.    Neck: Negative for neck stiffness.   Cardiovascular: Negative for chest trauma.   Eyes: Negative for eye trauma, double vision and blurred vision.   Respiratory: Positive for cough and sputum production.    Gastrointestinal: Negative for abdominal trauma and abdominal pain.   Musculoskeletal: Positive for pain, trauma, joint pain and abnormal ROM of joint. Negative for joint swelling and pain with walking.   Skin: Negative for color change, wound, abrasion and laceration.   Neurological: Negative for dizziness, light-headedness, coordination disturbances and numbness.   Hematologic/Lymphatic: Negative for history of bleeding disorder.       Objective:      Physical Exam   Constitutional: He is oriented to person, place, and time. He appears well-developed. He is cooperative.  Non-toxic appearance. He does not appear ill. No distress.   HENT:   Head: Normocephalic and atraumatic. Head is without abrasion, without contusion and without laceration.   Ears:   Right Ear: Hearing, tympanic membrane, external ear and ear canal  normal. No hemotympanum.   Left Ear: Hearing, tympanic membrane, external ear and ear canal normal. No hemotympanum.   Nose: Nose normal. No mucosal edema, rhinorrhea or nasal deformity. No epistaxis. Right sinus exhibits no maxillary sinus tenderness and no frontal sinus tenderness. Left sinus exhibits no maxillary sinus tenderness and no frontal sinus tenderness.   Mouth/Throat: Uvula is midline, oropharynx is clear and moist and mucous membranes are normal. No trismus in the jaw. Normal dentition. No uvula swelling. No posterior oropharyngeal erythema.   Eyes: Pupils are equal, round, and reactive to light. Conjunctivae, EOM and lids are normal. Right eye exhibits no discharge. Left eye exhibits no discharge. No scleral icterus.   Neck: Trachea normal, normal range of motion, full passive range of motion without pain and phonation normal. Neck supple. No spinous process tenderness and no muscular tenderness present. No neck rigidity. No tracheal deviation present.   Cardiovascular: Normal rate, regular rhythm, normal heart sounds and normal pulses.   Pulmonary/Chest: Effort normal and breath sounds normal. No respiratory distress.   Abdominal: Soft. Normal appearance and bowel sounds are normal. He exhibits no distension, no pulsatile midline mass and no mass. There is no abdominal tenderness.   Musculoskeletal:         General: No deformity.      Right shoulder: He exhibits decreased range of motion, tenderness and pain.        Arms:    Neurological: He is alert and oriented to person, place, and time. He has normal strength. No cranial nerve deficit or sensory deficit. He exhibits normal muscle tone. He displays no seizure activity. Coordination normal. GCS eye subscore is 4. GCS verbal subscore is 5. GCS motor subscore is 6.   Skin: Skin is warm, dry, intact, not diaphoretic and not pale. Capillary refill takes less than 2 seconds. abrasion, burn, bruising and ecchymosisPsychiatric: His speech is normal and  behavior is normal. Judgment and thought content normal.   Nursing note and vitals reviewed.    Type of Interpretation: ED Physician (Independently Interpreted).  Radiology Procedure Done: Right Shoulder.  Interpretation: No fx, chronic arthritic changes.            Assessment:       1. Sprain of right shoulder, unspecified shoulder sprain type, initial encounter        Plan:         Sprain of right shoulder, unspecified shoulder sprain type, initial encounter  -     X-ray Shoulder 2 or More Views Right; Future; Expected date: 12/07/2020  -     ketorolac injection 30 mg  -     SLING FOR HOME USE     Please drink plenty of fluids.  Please get plenty of rest.  Please return here or go to the Emergency Department for any concerns or worsening of condition.  If you were prescribed a narcotic medication, do not drive or operate heavy equipment or machinery while taking these medications.  If you were not prescribed an anti-inflammatory medication, and if you do not have any history of stomach/intestinal ulcers, or kidney disease, or are not taking a blood thinner such as Coumadin, Plavix, Pradaxa, Eloquis, or Xaralta for example, it is OK to take over the counter Ibuprofen or Advil or Motrin or Aleve as directed.  Do not take these medications on an empty stomach.  Rest, ice, compression and elevation to the affected joint or limb as needed.    If you were given a sling, wear it for comfort until follow up as arranged.  If you were given or placed in a splint, wear it until your follow up visit or recheck.  If you  smoke, please stop smoking.       Please follow up with your primary care doctor or specialist as needed.    Stephanie Hernandez, NP  [phone]    You must understand that you have received treatment at an Urgent Care facility only, and that you may be  released before all of your medical problems are known or treated. Urgent Care facilities are not equipped to  handle life threatening emergencies. It is  recommended that you seek care at an Emergency Department for  further evaluation of worsening or concerning symptoms, or possibly life threatening conditions as  discussed.

## 2020-12-07 NOTE — PATIENT INSTRUCTIONS
Please drink plenty of fluids.  Please get plenty of rest.  Please return here or go to the Emergency Department for any concerns or worsening of condition.  If you were prescribed a narcotic medication, do not drive or operate heavy equipment or machinery while taking these medications.  If you were not prescribed an anti-inflammatory medication, and if you do not have any history of stomach/intestinal ulcers, or kidney disease, or are not taking a blood thinner such as Coumadin, Plavix, Pradaxa, Eloquis, or Xaralta for example, it is OK to take over the counter Ibuprofen or Advil or Motrin or Aleve as directed.  Do not take these medications on an empty stomach.  Rest, ice, compression and elevation to the affected joint or limb as needed.    If you were given a sling, wear it for comfort until follow up as arranged.  If you were given or placed in a splint, wear it until your follow up visit or recheck.  If you  smoke, please stop smoking.       Please follow up with your primary care doctor or specialist as needed.    Stephanie Hernandez, NP  [phone]    You must understand that you have received treatment at an Urgent Care facility only, and that you may be  released before all of your medical problems are known or treated. Urgent Care facilities are not equipped to  handle life threatening emergencies. It is recommended that you seek care at an Emergency Department for  further evaluation of worsening or concerning symptoms, or possibly life threatening conditions as  discussed.      Shoulder Sprain  A sprain is a stretching or tearing of the ligaments that hold a joint together. A sprain may take up to 8 weeks to fully heal, depending on how severe it is. Moderate to severe shoulder sprains are treated with a sling or shoulder immobilizer. Minor sprains can be treated without any special support.  Home care  The following guidelines will help you care for your injury at home:  · If a sling was given to you,  leave it in place for the time advised by your healthcare provider. If you arent sure how long to wear it, ask for advice. If the sling becomes loose, adjust it so that your forearm is level with the ground. Your shoulder should feel well supported.  · Put an ice pack on the injured area for 20 minutes every 1 to 2 hours the first day. You can make your own ice pack by putting ice cubes in a plastic bag. A bag of frozen peas or something similar works well too. Wrap the bag in a thin towel. Continue with ice packs 3 to 4 times a day for the next 2 to 3 days. Then use the pack as needed to ease pain and swelling.  · You may use acetaminophen or ibuprofen to control pain, unless another pain medicine was prescribed. If you have chronic liver or kidney disease, talk with your healthcare provider before using these medicines. Also talk with your provider if youve had a stomach ulcer or gastrointestinal bleeding.  · Shoulder joints become stiff if left in a sling for too long. You should start range of motion exercises about 7 to 10 days after the injury. Talk with your provider to find out what type of exercises to do and how soon to start.  Follow-up care  Follow up with your healthcare provider, or as advised.  Any X-rays you had today dont show any broken bones, breaks, or fractures. Sometimes fractures dont show up on the first X-ray. Bruises and sprains can sometimes hurt as much as a fracture. These injuries can take time to heal completely. If your symptoms dont improve or they get worse, talk with your provider. You may need a repeat X-ray or other treatments.  When to seek medical advice  Call your healthcare provider right away if any of these occur:  · Shoulder pain or swelling in your arm that gets worse  · Fingers become cold, blue, numb, or tingly  · Large amount of bruising of the shoulder or upper arm  · Fever or chills  Date Last Reviewed: 8/1/2016  © 0707-2815 [employer] [address]  [place] [address]. All rights reserved. This information is not intended as a substitute for professional medical care. Always follow your healthcare professional's instructions.

## 2021-01-12 PROBLEM — E11.22 TYPE 2 DIABETES MELLITUS WITH CHRONIC KIDNEY DISEASE ON CHRONIC DIALYSIS, WITHOUT LONG-TERM CURRENT USE OF INSULIN: Status: ACTIVE | Noted: 2021-01-12

## 2021-01-12 PROBLEM — Z99.2 TYPE 2 DIABETES MELLITUS WITH CHRONIC KIDNEY DISEASE ON CHRONIC DIALYSIS, WITHOUT LONG-TERM CURRENT USE OF INSULIN: Status: ACTIVE | Noted: 2021-01-12

## 2021-01-12 PROBLEM — I71.20 THORACIC AORTIC ANEURYSM WITHOUT RUPTURE: Status: ACTIVE | Noted: 2021-01-12

## 2021-01-12 PROBLEM — N18.6 TYPE 2 DIABETES MELLITUS WITH CHRONIC KIDNEY DISEASE ON CHRONIC DIALYSIS, WITHOUT LONG-TERM CURRENT USE OF INSULIN: Status: ACTIVE | Noted: 2021-01-12

## 2021-03-03 ENCOUNTER — OFFICE VISIT (OUTPATIENT)
Dept: URGENT CARE | Facility: CLINIC | Age: 81
End: 2021-03-03
Payer: MEDICARE

## 2021-03-03 VITALS
BODY MASS INDEX: 23.95 KG/M2 | WEIGHT: 149 LBS | OXYGEN SATURATION: 95 % | SYSTOLIC BLOOD PRESSURE: 121 MMHG | TEMPERATURE: 99 F | DIASTOLIC BLOOD PRESSURE: 64 MMHG | HEART RATE: 71 BPM | RESPIRATION RATE: 20 BRPM | HEIGHT: 66 IN

## 2021-03-03 DIAGNOSIS — M54.9 MUSCULOSKELETAL BACK PAIN: Primary | ICD-10-CM

## 2021-03-03 PROCEDURE — 99213 PR OFFICE/OUTPT VISIT, EST, LEVL III, 20-29 MIN: ICD-10-PCS | Mod: S$GLB,,, | Performed by: NURSE PRACTITIONER

## 2021-03-03 PROCEDURE — 99213 OFFICE O/P EST LOW 20 MIN: CPT | Mod: S$GLB,,, | Performed by: NURSE PRACTITIONER

## 2021-03-03 RX ORDER — DICLOFENAC SODIUM 10 MG/G
2 GEL TOPICAL DAILY
Qty: 20 G | Refills: 0 | Status: SHIPPED | OUTPATIENT
Start: 2021-03-03 | End: 2021-07-29 | Stop reason: SDUPTHER

## 2021-05-04 ENCOUNTER — PATIENT MESSAGE (OUTPATIENT)
Dept: RESEARCH | Facility: HOSPITAL | Age: 81
End: 2021-05-04

## 2021-05-10 ENCOUNTER — PATIENT MESSAGE (OUTPATIENT)
Dept: RESEARCH | Facility: HOSPITAL | Age: 81
End: 2021-05-10

## 2022-01-24 ENCOUNTER — OFFICE VISIT (OUTPATIENT)
Dept: URGENT CARE | Facility: CLINIC | Age: 82
End: 2022-01-24
Payer: MEDICARE

## 2022-01-24 VITALS
TEMPERATURE: 97 F | DIASTOLIC BLOOD PRESSURE: 74 MMHG | WEIGHT: 141 LBS | SYSTOLIC BLOOD PRESSURE: 170 MMHG | HEIGHT: 67 IN | OXYGEN SATURATION: 98 % | RESPIRATION RATE: 16 BRPM | HEART RATE: 75 BPM | BODY MASS INDEX: 22.13 KG/M2

## 2022-01-24 DIAGNOSIS — R07.9 CHEST PAIN, UNSPECIFIED TYPE: Primary | ICD-10-CM

## 2022-01-24 PROCEDURE — 93005 ELECTROCARDIOGRAM TRACING: CPT | Mod: S$GLB,,, | Performed by: FAMILY MEDICINE

## 2022-01-24 PROCEDURE — 3077F PR MOST RECENT SYSTOLIC BLOOD PRESSURE >= 140 MM HG: ICD-10-PCS | Mod: CPTII,S$GLB,, | Performed by: FAMILY MEDICINE

## 2022-01-24 PROCEDURE — 1159F PR MEDICATION LIST DOCUMENTED IN MEDICAL RECORD: ICD-10-PCS | Mod: CPTII,S$GLB,, | Performed by: FAMILY MEDICINE

## 2022-01-24 PROCEDURE — 1125F PR PAIN SEVERITY QUANTIFIED, PAIN PRESENT: ICD-10-PCS | Mod: CPTII,S$GLB,, | Performed by: FAMILY MEDICINE

## 2022-01-24 PROCEDURE — 1125F AMNT PAIN NOTED PAIN PRSNT: CPT | Mod: CPTII,S$GLB,, | Performed by: FAMILY MEDICINE

## 2022-01-24 PROCEDURE — 93005 EKG 12-LEAD: ICD-10-PCS | Mod: S$GLB,,, | Performed by: FAMILY MEDICINE

## 2022-01-24 PROCEDURE — 3078F PR MOST RECENT DIASTOLIC BLOOD PRESSURE < 80 MM HG: ICD-10-PCS | Mod: CPTII,S$GLB,, | Performed by: FAMILY MEDICINE

## 2022-01-24 PROCEDURE — 93010 EKG 12-LEAD: ICD-10-PCS | Mod: S$GLB,,, | Performed by: INTERNAL MEDICINE

## 2022-01-24 PROCEDURE — 3078F DIAST BP <80 MM HG: CPT | Mod: CPTII,S$GLB,, | Performed by: FAMILY MEDICINE

## 2022-01-24 PROCEDURE — 99214 OFFICE O/P EST MOD 30 MIN: CPT | Mod: S$GLB,,, | Performed by: FAMILY MEDICINE

## 2022-01-24 PROCEDURE — 93010 ELECTROCARDIOGRAM REPORT: CPT | Mod: S$GLB,,, | Performed by: INTERNAL MEDICINE

## 2022-01-24 PROCEDURE — 1160F RVW MEDS BY RX/DR IN RCRD: CPT | Mod: CPTII,S$GLB,, | Performed by: FAMILY MEDICINE

## 2022-01-24 PROCEDURE — 3077F SYST BP >= 140 MM HG: CPT | Mod: CPTII,S$GLB,, | Performed by: FAMILY MEDICINE

## 2022-01-24 PROCEDURE — 1159F MED LIST DOCD IN RCRD: CPT | Mod: CPTII,S$GLB,, | Performed by: FAMILY MEDICINE

## 2022-01-24 PROCEDURE — 1160F PR REVIEW ALL MEDS BY PRESCRIBER/CLIN PHARMACIST DOCUMENTED: ICD-10-PCS | Mod: CPTII,S$GLB,, | Performed by: FAMILY MEDICINE

## 2022-01-24 PROCEDURE — 99214 PR OFFICE/OUTPT VISIT, EST, LEVL IV, 30-39 MIN: ICD-10-PCS | Mod: S$GLB,,, | Performed by: FAMILY MEDICINE

## 2022-01-24 NOTE — PATIENT INSTRUCTIONS
YOUR CONDITION IS POTENTIALLY LIFE THREATENING.  GO TO ER NOW FOR FURTHER EVALUATION AND TREATMENT.    You were offered transfer by ambulance.  You have declined ambulance transport and agree to go to nearest ER by private auto.  The risks of this decision were explained to you.    Patient Education       Chest Pain, Adult ED   General Information   You came to the Emergency Department (ED) for chest pain. The doctor feels that the risk of a serious cause for your chest pain is low. Many things can cause chest pain. Some are serious things like heart disease or lung disease. Less serious things like stomach problems can also cause chest pain.  The doctors may not be able to find all serious causes of chest pain the first time they see you. It is important that you follow up with your doctor. You may be waiting on some test results. The staff will notify you if there are concerning results.  What care is needed at home?   · Call your regular doctor to let them know you were in the ED. Make a follow-up appointment if you were told to.  · Follow your regular doctors orders to keep your blood pressure, cholesterol, and high blood sugar (diabetes) under control.  · If you smoke, try to quit. Your doctor or nurse can help.  · Keep a healthy weight. If you are too heavy, lose weight.  · Eat a healthy diet, as discussed with your doctor or nurse.  When do I need to get emergency help?   · Call for an ambulance if:   ? You have signs of a heart attack, which may include:  § Severe chest pain, pressure, or discomfort with:  § Breathing trouble; sweating; upset stomach; or cold, clammy skin.  § Pain in your arms, back, or jaw.  § Worse pain with activity like walking up stairs.  § Fast or irregular heartbeat.  § Feeling dizzy, faint, or weak.  When do I need to call the doctor?   · You have a fever of 100.4°F (38°C) or higher or chills.  · You cough up yellow or green mucus.  · You are more tired than normal or more trouble  breathing with activity.  · You have new or worsening symptoms.  Last Reviewed Date   2020-06-28  Consumer Information Use and Disclaimer   This information is not specific medical advice and does not replace information you receive from your health care provider. This is only a brief summary of general information. It does NOT include all information about conditions, illnesses, injuries, tests, procedures, treatments, therapies, discharge instructions or life-style choices that may apply to you. You must talk with your health care provider for complete information about your health and treatment options. This information should not be used to decide whether or not to accept your health care providers advice, instructions or recommendations. Only your health care provider has the knowledge and training to provide advice that is right for you.  Copyright   Copyright © 2021 [employer] and its affiliates and/or licensors. All rights reserved.

## 2022-09-01 PROBLEM — H25.12 AGE-RELATED NUCLEAR CATARACT OF LEFT EYE: Status: ACTIVE | Noted: 2022-09-01

## 2022-10-24 PROBLEM — D64.9 SYMPTOMATIC ANEMIA: Status: ACTIVE | Noted: 2022-10-24

## 2022-10-24 PROBLEM — I21.4 NSTEMI (NON-ST ELEVATED MYOCARDIAL INFARCTION): Status: ACTIVE | Noted: 2022-10-24

## 2023-01-30 PROBLEM — I21.4 NSTEMI (NON-ST ELEVATED MYOCARDIAL INFARCTION): Status: RESOLVED | Noted: 2022-10-24 | Resolved: 2023-01-30

## 2023-02-19 DIAGNOSIS — U07.1 COVID-19 VIRUS DETECTED: ICD-10-CM

## 2023-03-01 PROBLEM — R51.9 HEADACHE: Status: ACTIVE | Noted: 2023-03-01

## 2023-03-03 DIAGNOSIS — M31.6 GIANT CELL ARTERITIS SYNDROME: Primary | ICD-10-CM

## 2023-03-27 PROBLEM — Z79.52 CURRENT CHRONIC USE OF SYSTEMIC STEROIDS: Status: ACTIVE | Noted: 2023-03-27

## 2023-03-27 PROBLEM — I48.92 ATRIAL FLUTTER: Status: ACTIVE | Noted: 2023-03-27

## 2023-03-29 PROBLEM — I50.20 HFREF (HEART FAILURE WITH REDUCED EJECTION FRACTION): Status: ACTIVE | Noted: 2023-03-29

## 2023-04-28 ENCOUNTER — PATIENT OUTREACH (OUTPATIENT)
Dept: ADMINISTRATIVE | Facility: CLINIC | Age: 83
End: 2023-04-28
Payer: MEDICARE

## 2023-04-28 NOTE — PROGRESS NOTES
C3 nurse attempted to contact Margaret Hernández Jr. for a TCC post hospital discharge follow up call. No answer. Left voicemail with callback information. The patient has a scheduled HOSFU appointment with Stephanie Hernandez NP on 05/09/23 @ 1300.

## 2023-05-01 NOTE — PROGRESS NOTES
2nd Attempt made to reach patient for TCC call. Pt unable to complete tcc call at this time. Pt requests callback.

## 2023-05-02 NOTE — PROGRESS NOTES
3rd Attempt made to reach patient for TCC call. Left voicemail please call [phone] leave first name, last name, and  Nataly will return your call.

## 2023-07-11 ENCOUNTER — PROCEDURE VISIT (OUTPATIENT)
Dept: NEUROLOGY | Facility: CLINIC | Age: 83
End: 2023-07-11
Payer: MEDICARE

## 2023-07-11 DIAGNOSIS — G62.9 POLYNEUROPATHY: Primary | ICD-10-CM

## 2023-07-11 DIAGNOSIS — R20.2 PARESTHESIA OF BOTH FEET: ICD-10-CM

## 2023-07-11 PROCEDURE — 95910 PR NERVE CONDUCTION STUDY; 7-8 STUDIES: ICD-10-PCS | Mod: S$GLB,,, | Performed by: PSYCHIATRY & NEUROLOGY

## 2023-07-11 PROCEDURE — 95886 PR EMG COMPLETE, W/ NERVE CONDUCTION STUDIES, 5+ MUSCLES: ICD-10-PCS | Mod: S$GLB,,, | Performed by: PSYCHIATRY & NEUROLOGY

## 2023-07-11 PROCEDURE — 95910 NRV CNDJ TEST 7-8 STUDIES: CPT | Mod: S$GLB,,, | Performed by: PSYCHIATRY & NEUROLOGY

## 2023-07-11 PROCEDURE — 95886 MUSC TEST DONE W/N TEST COMP: CPT | Mod: S$GLB,,, | Performed by: PSYCHIATRY & NEUROLOGY

## 2023-07-31 PROBLEM — I21.4 NSTEMI (NON-ST ELEVATED MYOCARDIAL INFARCTION): Status: RESOLVED | Noted: 2022-10-24 | Resolved: 2023-07-31

## 2023-08-08 PROBLEM — A53.9 SYPHILIS: Status: ACTIVE | Noted: 2023-08-08

## 2023-08-18 ENCOUNTER — HOSPITAL ENCOUNTER (INPATIENT)
Facility: HOSPITAL | Age: 83
LOS: 4 days | Discharge: HOME | DRG: 811 | End: 2023-08-22
Attending: INTERNAL MEDICINE | Admitting: INTERNAL MEDICINE
Payer: MEDICARE

## 2023-08-18 DIAGNOSIS — K92.1 MELENA: ICD-10-CM

## 2023-08-18 DIAGNOSIS — D64.9 SYMPTOMATIC ANEMIA: ICD-10-CM

## 2023-08-18 DIAGNOSIS — E87.5 HYPERKALEMIA: ICD-10-CM

## 2023-08-18 DIAGNOSIS — D62 ACUTE BLOOD LOSS ANEMIA: Primary | ICD-10-CM

## 2023-08-18 DIAGNOSIS — R07.9 CHEST PAIN: ICD-10-CM

## 2023-08-18 DIAGNOSIS — D63.1 ANEMIA DUE TO CHRONIC KIDNEY DISEASE, ON CHRONIC DIALYSIS: Chronic | ICD-10-CM

## 2023-08-18 DIAGNOSIS — Z99.2 ANEMIA DUE TO CHRONIC KIDNEY DISEASE, ON CHRONIC DIALYSIS: Chronic | ICD-10-CM

## 2023-08-18 DIAGNOSIS — I21.4 NSTEMI (NON-ST ELEVATED MYOCARDIAL INFARCTION): ICD-10-CM

## 2023-08-18 DIAGNOSIS — N18.6 ANEMIA DUE TO CHRONIC KIDNEY DISEASE, ON CHRONIC DIALYSIS: Chronic | ICD-10-CM

## 2023-08-18 DIAGNOSIS — I21.A1 TYPE 2 MI (MYOCARDIAL INFARCTION): ICD-10-CM

## 2023-08-18 PROBLEM — I15.1 HYPERTENSION SECONDARY TO OTHER RENAL DISORDERS: Status: ACTIVE | Noted: 2018-03-01

## 2023-08-18 PROBLEM — D50.9 IRON DEFICIENCY ANEMIA, UNSPECIFIED: Status: ACTIVE | Noted: 2018-03-01

## 2023-08-18 PROBLEM — Q15.0 CONGENITAL GLAUCOMA: Status: ACTIVE | Noted: 2018-03-01

## 2023-08-18 PROBLEM — E87.29 HIGH ANION GAP METABOLIC ACIDOSIS: Status: RESOLVED | Noted: 2018-02-21 | Resolved: 2023-08-18

## 2023-08-18 PROBLEM — N19 UREMIA: Status: RESOLVED | Noted: 2018-02-19 | Resolved: 2023-08-18

## 2023-08-18 PROBLEM — E55.9 VITAMIN D DEFICIENCY, UNSPECIFIED: Status: ACTIVE | Noted: 2021-12-24

## 2023-08-18 PROBLEM — E78.5 HYPERLIPIDEMIA, UNSPECIFIED: Status: ACTIVE | Noted: 2018-03-01

## 2023-08-18 PROBLEM — A52.3 NEUROSYPHILIS IN ADULT: Status: ACTIVE | Noted: 2023-08-08

## 2023-08-18 PROBLEM — Z79.01 LONG TERM (CURRENT) USE OF ANTICOAGULANTS: Status: ACTIVE | Noted: 2018-03-01

## 2023-08-18 PROBLEM — R51.9 HEADACHE: Status: RESOLVED | Noted: 2023-03-01 | Resolved: 2023-08-18

## 2023-08-18 PROBLEM — E44.1 MILD PROTEIN-CALORIE MALNUTRITION: Status: ACTIVE | Noted: 2019-08-23

## 2023-08-18 PROBLEM — E83.39 OTHER DISORDERS OF PHOSPHORUS METABOLISM: Status: ACTIVE | Noted: 2018-08-17

## 2023-08-18 LAB
ABO + RH BLD: NORMAL
ALBUMIN SERPL BCP-MCNC: 2.3 G/DL (ref 3.5–5.2)
ANION GAP SERPL CALC-SCNC: 18 MMOL/L (ref 8–16)
APTT PPP: 25.5 SEC (ref 21–32)
BLD GP AB SCN CELLS X3 SERPL QL: NORMAL
BUN SERPL-MCNC: 85 MG/DL (ref 8–23)
CALCIUM SERPL-MCNC: 9 MG/DL (ref 8.7–10.5)
CHLORIDE SERPL-SCNC: 95 MMOL/L (ref 95–110)
CO2 SERPL-SCNC: 26 MMOL/L (ref 23–29)
CREAT SERPL-MCNC: 9.5 MG/DL (ref 0.5–1.4)
ERYTHROCYTE [DISTWIDTH] IN BLOOD BY AUTOMATED COUNT: 17.9 % (ref 11.5–14.5)
EST. GFR  (NO RACE VARIABLE): 4.9 ML/MIN/1.73 M^2
FERRITIN SERPL-MCNC: 2625 NG/ML (ref 20–300)
FOLATE SERPL-MCNC: 11.7 NG/ML (ref 4–24)
GLUCOSE SERPL-MCNC: 140 MG/DL (ref 70–110)
HCT VFR BLD AUTO: 23.8 % (ref 40–54)
HGB BLD-MCNC: 7.6 G/DL (ref 14–18)
INR PPP: 1 (ref 0.8–1.2)
IRON SERPL-MCNC: 32 UG/DL (ref 45–160)
LACTATE SERPL-SCNC: 1 MMOL/L (ref 0.5–2.2)
MAGNESIUM SERPL-MCNC: 2.2 MG/DL (ref 1.6–2.6)
MCH RBC QN AUTO: 29.3 PG (ref 27–31)
MCHC RBC AUTO-ENTMCNC: 31.9 G/DL (ref 32–36)
MCV RBC AUTO: 92 FL (ref 82–98)
PHOSPHATE SERPL-MCNC: 4.6 MG/DL (ref 2.7–4.5)
PLATELET # BLD AUTO: 212 K/UL (ref 150–450)
PMV BLD AUTO: 10.7 FL (ref 9.2–12.9)
POTASSIUM SERPL-SCNC: 5.8 MMOL/L (ref 3.5–5.1)
PROTHROMBIN TIME: 11 SEC (ref 9–12.5)
RBC # BLD AUTO: 2.59 M/UL (ref 4.6–6.2)
SATURATED IRON: 12 % (ref 20–50)
SODIUM SERPL-SCNC: 139 MMOL/L (ref 136–145)
SPECIMEN OUTDATE: NORMAL
TOTAL IRON BINDING CAPACITY: 275 UG/DL (ref 250–450)
TRANSFERRIN SERPL-MCNC: 186 MG/DL (ref 200–375)
TROPONIN I SERPL DL<=0.01 NG/ML-MCNC: 2.88 NG/ML (ref 0–0.03)
VIT B12 SERPL-MCNC: 625 PG/ML (ref 210–950)
WBC # BLD AUTO: 20.83 K/UL (ref 3.9–12.7)

## 2023-08-18 PROCEDURE — 82746 ASSAY OF FOLIC ACID SERUM: CPT | Performed by: STUDENT IN AN ORGANIZED HEALTH CARE EDUCATION/TRAINING PROGRAM

## 2023-08-18 PROCEDURE — 93010 ELECTROCARDIOGRAM REPORT: CPT | Mod: ,,, | Performed by: INTERNAL MEDICINE

## 2023-08-18 PROCEDURE — 25000003 PHARM REV CODE 250: Performed by: HOSPITALIST

## 2023-08-18 PROCEDURE — 84484 ASSAY OF TROPONIN QUANT: CPT | Mod: 91 | Performed by: HOSPITALIST

## 2023-08-18 PROCEDURE — 20600001 HC STEP DOWN PRIVATE ROOM

## 2023-08-18 PROCEDURE — C9113 INJ PANTOPRAZOLE SODIUM, VIA: HCPCS | Performed by: HOSPITALIST

## 2023-08-18 PROCEDURE — 99223 PR INITIAL HOSPITAL CARE,LEVL III: ICD-10-PCS | Mod: ,,, | Performed by: HOSPITALIST

## 2023-08-18 PROCEDURE — 85027 COMPLETE CBC AUTOMATED: CPT | Performed by: HOSPITALIST

## 2023-08-18 PROCEDURE — 82728 ASSAY OF FERRITIN: CPT | Performed by: STUDENT IN AN ORGANIZED HEALTH CARE EDUCATION/TRAINING PROGRAM

## 2023-08-18 PROCEDURE — 86920 COMPATIBILITY TEST SPIN: CPT | Performed by: STUDENT IN AN ORGANIZED HEALTH CARE EDUCATION/TRAINING PROGRAM

## 2023-08-18 PROCEDURE — 63600175 PHARM REV CODE 636 W HCPCS: Performed by: HOSPITALIST

## 2023-08-18 PROCEDURE — 84466 ASSAY OF TRANSFERRIN: CPT | Performed by: STUDENT IN AN ORGANIZED HEALTH CARE EDUCATION/TRAINING PROGRAM

## 2023-08-18 PROCEDURE — 80069 RENAL FUNCTION PANEL: CPT | Performed by: HOSPITALIST

## 2023-08-18 PROCEDURE — 85610 PROTHROMBIN TIME: CPT | Mod: 91 | Performed by: HOSPITALIST

## 2023-08-18 PROCEDURE — 86900 BLOOD TYPING SEROLOGIC ABO: CPT | Mod: 91 | Performed by: HOSPITALIST

## 2023-08-18 PROCEDURE — 83605 ASSAY OF LACTIC ACID: CPT | Performed by: HOSPITALIST

## 2023-08-18 PROCEDURE — 93010 EKG 12-LEAD: ICD-10-PCS | Mod: ,,, | Performed by: INTERNAL MEDICINE

## 2023-08-18 PROCEDURE — 82607 VITAMIN B-12: CPT | Performed by: STUDENT IN AN ORGANIZED HEALTH CARE EDUCATION/TRAINING PROGRAM

## 2023-08-18 PROCEDURE — 85730 THROMBOPLASTIN TIME PARTIAL: CPT | Mod: 91 | Performed by: HOSPITALIST

## 2023-08-18 PROCEDURE — 93005 ELECTROCARDIOGRAM TRACING: CPT

## 2023-08-18 PROCEDURE — 99223 1ST HOSP IP/OBS HIGH 75: CPT | Mod: ,,, | Performed by: HOSPITALIST

## 2023-08-18 PROCEDURE — 86920 COMPATIBILITY TEST SPIN: CPT | Performed by: HOSPITALIST

## 2023-08-18 PROCEDURE — 83735 ASSAY OF MAGNESIUM: CPT | Performed by: HOSPITALIST

## 2023-08-18 RX ORDER — TIMOLOL MALEATE 5 MG/ML
1 SOLUTION/ DROPS OPHTHALMIC 2 TIMES DAILY
Status: DISCONTINUED | OUTPATIENT
Start: 2023-08-18 | End: 2023-08-22 | Stop reason: HOSPADM

## 2023-08-18 RX ORDER — PANTOPRAZOLE SODIUM 40 MG/10ML
40 INJECTION, POWDER, LYOPHILIZED, FOR SOLUTION INTRAVENOUS EVERY 12 HOURS
Status: DISCONTINUED | OUTPATIENT
Start: 2023-08-18 | End: 2023-08-21

## 2023-08-18 RX ORDER — CARVEDILOL 25 MG/1
25 TABLET ORAL 2 TIMES DAILY
Status: DISCONTINUED | OUTPATIENT
Start: 2023-08-18 | End: 2023-08-20

## 2023-08-18 RX ORDER — INSULIN ASPART 100 [IU]/ML
0-5 INJECTION, SOLUTION INTRAVENOUS; SUBCUTANEOUS
Status: DISCONTINUED | OUTPATIENT
Start: 2023-08-18 | End: 2023-08-22 | Stop reason: HOSPADM

## 2023-08-18 RX ORDER — ACETAMINOPHEN 325 MG/1
650 TABLET ORAL EVERY 4 HOURS PRN
Status: DISCONTINUED | OUTPATIENT
Start: 2023-08-18 | End: 2023-08-22 | Stop reason: HOSPADM

## 2023-08-18 RX ORDER — GLUCAGON 1 MG
1 KIT INJECTION
Status: DISCONTINUED | OUTPATIENT
Start: 2023-08-18 | End: 2023-08-22 | Stop reason: HOSPADM

## 2023-08-18 RX ORDER — NALOXONE HCL 0.4 MG/ML
0.02 VIAL (ML) INJECTION
Status: DISCONTINUED | OUTPATIENT
Start: 2023-08-18 | End: 2023-08-22 | Stop reason: HOSPADM

## 2023-08-18 RX ORDER — PROCHLORPERAZINE EDISYLATE 5 MG/ML
5 INJECTION INTRAMUSCULAR; INTRAVENOUS EVERY 6 HOURS PRN
Status: DISCONTINUED | OUTPATIENT
Start: 2023-08-18 | End: 2023-08-22 | Stop reason: HOSPADM

## 2023-08-18 RX ORDER — DORZOLAMIDE HCL 20 MG/ML
1 SOLUTION/ DROPS OPHTHALMIC 2 TIMES DAILY
Status: DISCONTINUED | OUTPATIENT
Start: 2023-08-18 | End: 2023-08-22 | Stop reason: HOSPADM

## 2023-08-18 RX ORDER — SODIUM CHLORIDE 0.9 % (FLUSH) 0.9 %
10 SYRINGE (ML) INJECTION EVERY 6 HOURS PRN
Status: DISCONTINUED | OUTPATIENT
Start: 2023-08-18 | End: 2023-08-22 | Stop reason: HOSPADM

## 2023-08-18 RX ORDER — IBUPROFEN 200 MG
16 TABLET ORAL
Status: DISCONTINUED | OUTPATIENT
Start: 2023-08-18 | End: 2023-08-22 | Stop reason: HOSPADM

## 2023-08-18 RX ORDER — FLUTICASONE PROPIONATE 50 MCG
1 SPRAY, SUSPENSION (ML) NASAL DAILY
Status: DISCONTINUED | OUTPATIENT
Start: 2023-08-19 | End: 2023-08-22 | Stop reason: HOSPADM

## 2023-08-18 RX ORDER — POLYETHYLENE GLYCOL 3350 17 G/17G
17 POWDER, FOR SOLUTION ORAL 2 TIMES DAILY PRN
Status: DISCONTINUED | OUTPATIENT
Start: 2023-08-18 | End: 2023-08-22 | Stop reason: HOSPADM

## 2023-08-18 RX ORDER — IBUPROFEN 200 MG
24 TABLET ORAL
Status: DISCONTINUED | OUTPATIENT
Start: 2023-08-18 | End: 2023-08-22 | Stop reason: HOSPADM

## 2023-08-18 RX ORDER — FUROSEMIDE 40 MG/1
40 TABLET ORAL
Status: DISCONTINUED | OUTPATIENT
Start: 2023-08-19 | End: 2023-08-22 | Stop reason: HOSPADM

## 2023-08-18 RX ORDER — TALC
6 POWDER (GRAM) TOPICAL NIGHTLY PRN
Status: DISCONTINUED | OUTPATIENT
Start: 2023-08-18 | End: 2023-08-22 | Stop reason: HOSPADM

## 2023-08-18 RX ORDER — CALCIUM ACETATE 667 MG/1
667 CAPSULE ORAL
Status: DISCONTINUED | OUTPATIENT
Start: 2023-08-19 | End: 2023-08-22 | Stop reason: HOSPADM

## 2023-08-18 RX ORDER — FUROSEMIDE 10 MG/ML
80 INJECTION INTRAMUSCULAR; INTRAVENOUS ONCE
Status: COMPLETED | OUTPATIENT
Start: 2023-08-19 | End: 2023-08-18

## 2023-08-18 RX ORDER — ONDANSETRON 2 MG/ML
4 INJECTION INTRAMUSCULAR; INTRAVENOUS EVERY 8 HOURS PRN
Status: DISCONTINUED | OUTPATIENT
Start: 2023-08-18 | End: 2023-08-22 | Stop reason: HOSPADM

## 2023-08-18 RX ORDER — CALCIUM GLUCONATE 20 MG/ML
1 INJECTION, SOLUTION INTRAVENOUS ONCE
Status: COMPLETED | OUTPATIENT
Start: 2023-08-19 | End: 2023-08-19

## 2023-08-18 RX ORDER — AMOXICILLIN 250 MG
1 CAPSULE ORAL DAILY PRN
Status: DISCONTINUED | OUTPATIENT
Start: 2023-08-18 | End: 2023-08-22 | Stop reason: HOSPADM

## 2023-08-18 RX ADMIN — CARVEDILOL 25 MG: 25 TABLET, FILM COATED ORAL at 09:08

## 2023-08-18 RX ADMIN — FUROSEMIDE 80 MG: 10 INJECTION, SOLUTION INTRAMUSCULAR; INTRAVENOUS at 11:08

## 2023-08-18 RX ADMIN — PANTOPRAZOLE SODIUM 40 MG: 40 INJECTION, POWDER, FOR SOLUTION INTRAVENOUS at 09:08

## 2023-08-19 PROBLEM — D62 ACUTE BLOOD LOSS ANEMIA: Status: ACTIVE | Noted: 2022-10-24

## 2023-08-19 PROBLEM — J96.01 ACUTE RESPIRATORY FAILURE WITH HYPOXIA: Status: ACTIVE | Noted: 2023-08-19

## 2023-08-19 LAB
ANION GAP SERPL CALC-SCNC: 16 MMOL/L (ref 8–16)
BASOPHILS # BLD AUTO: 0.02 K/UL (ref 0–0.2)
BASOPHILS # BLD AUTO: 0.03 K/UL (ref 0–0.2)
BASOPHILS # BLD AUTO: 0.04 K/UL (ref 0–0.2)
BASOPHILS NFR BLD: 0.2 % (ref 0–1.9)
BLD PROD TYP BPU: NORMAL
BLOOD UNIT EXPIRATION DATE: NORMAL
BLOOD UNIT TYPE CODE: 600
BLOOD UNIT TYPE: NORMAL
BUN SERPL-MCNC: 44 MG/DL (ref 8–23)
CALCIUM SERPL-MCNC: 9 MG/DL (ref 8.7–10.5)
CHLORIDE SERPL-SCNC: 103 MMOL/L (ref 95–110)
CHOLEST SERPL-MCNC: 106 MG/DL (ref 120–199)
CHOLEST/HDLC SERPL: 3.9 {RATIO} (ref 2–5)
CO2 SERPL-SCNC: 22 MMOL/L (ref 23–29)
CODING SYSTEM: NORMAL
CREAT SERPL-MCNC: 4.8 MG/DL (ref 0.5–1.4)
CROSSMATCH INTERPRETATION: NORMAL
DIFFERENTIAL METHOD: ABNORMAL
DISPENSE STATUS: NORMAL
EOSINOPHIL # BLD AUTO: 0 K/UL (ref 0–0.5)
EOSINOPHIL # BLD AUTO: 0.1 K/UL (ref 0–0.5)
EOSINOPHIL # BLD AUTO: 0.1 K/UL (ref 0–0.5)
EOSINOPHIL NFR BLD: 0 % (ref 0–8)
EOSINOPHIL NFR BLD: 0.2 % (ref 0–8)
EOSINOPHIL NFR BLD: 0.8 % (ref 0–8)
ERYTHROCYTE [DISTWIDTH] IN BLOOD BY AUTOMATED COUNT: 17.2 % (ref 11.5–14.5)
ERYTHROCYTE [DISTWIDTH] IN BLOOD BY AUTOMATED COUNT: 17.9 % (ref 11.5–14.5)
ERYTHROCYTE [DISTWIDTH] IN BLOOD BY AUTOMATED COUNT: 18.5 % (ref 11.5–14.5)
EST. GFR  (NO RACE VARIABLE): 11.2 ML/MIN/1.73 M^2
ESTIMATED AVG GLUCOSE: 100 MG/DL (ref 68–131)
GLUCOSE SERPL-MCNC: 159 MG/DL (ref 70–110)
HBA1C MFR BLD: 5.1 % (ref 4–5.6)
HBV SURFACE AG SERPL QL IA: NORMAL
HCT VFR BLD AUTO: 21.4 % (ref 40–54)
HCT VFR BLD AUTO: 25.5 % (ref 40–54)
HCT VFR BLD AUTO: 26.2 % (ref 40–54)
HDLC SERPL-MCNC: 27 MG/DL (ref 40–75)
HDLC SERPL: 25.5 % (ref 20–50)
HGB BLD-MCNC: 6.9 G/DL (ref 14–18)
HGB BLD-MCNC: 8.5 G/DL (ref 14–18)
HGB BLD-MCNC: 8.7 G/DL (ref 14–18)
IMM GRANULOCYTES # BLD AUTO: 0.07 K/UL (ref 0–0.04)
IMM GRANULOCYTES # BLD AUTO: 0.08 K/UL (ref 0–0.04)
IMM GRANULOCYTES # BLD AUTO: 0.18 K/UL (ref 0–0.04)
IMM GRANULOCYTES NFR BLD AUTO: 0.5 % (ref 0–0.5)
IMM GRANULOCYTES NFR BLD AUTO: 0.5 % (ref 0–0.5)
IMM GRANULOCYTES NFR BLD AUTO: 0.8 % (ref 0–0.5)
LDLC SERPL CALC-MCNC: 58.2 MG/DL (ref 63–159)
LYMPHOCYTES # BLD AUTO: 0.9 K/UL (ref 1–4.8)
LYMPHOCYTES # BLD AUTO: 1 K/UL (ref 1–4.8)
LYMPHOCYTES # BLD AUTO: 1.1 K/UL (ref 1–4.8)
LYMPHOCYTES NFR BLD: 4.4 % (ref 18–48)
LYMPHOCYTES NFR BLD: 5.2 % (ref 18–48)
LYMPHOCYTES NFR BLD: 8.4 % (ref 18–48)
MCH RBC QN AUTO: 29.4 PG (ref 27–31)
MCH RBC QN AUTO: 29.6 PG (ref 27–31)
MCH RBC QN AUTO: 29.6 PG (ref 27–31)
MCHC RBC AUTO-ENTMCNC: 32.2 G/DL (ref 32–36)
MCHC RBC AUTO-ENTMCNC: 33.2 G/DL (ref 32–36)
MCHC RBC AUTO-ENTMCNC: 33.3 G/DL (ref 32–36)
MCV RBC AUTO: 88 FL (ref 82–98)
MCV RBC AUTO: 89 FL (ref 82–98)
MCV RBC AUTO: 92 FL (ref 82–98)
MONOCYTES # BLD AUTO: 1.5 K/UL (ref 0.3–1)
MONOCYTES # BLD AUTO: 1.7 K/UL (ref 0.3–1)
MONOCYTES # BLD AUTO: 2.2 K/UL (ref 0.3–1)
MONOCYTES NFR BLD: 12.7 % (ref 4–15)
MONOCYTES NFR BLD: 8.4 % (ref 4–15)
MONOCYTES NFR BLD: 9.8 % (ref 4–15)
NEUTROPHILS # BLD AUTO: 10.3 K/UL (ref 1.8–7.7)
NEUTROPHILS # BLD AUTO: 15.1 K/UL (ref 1.8–7.7)
NEUTROPHILS # BLD AUTO: 18.8 K/UL (ref 1.8–7.7)
NEUTROPHILS NFR BLD: 77.4 % (ref 38–73)
NEUTROPHILS NFR BLD: 84.6 % (ref 38–73)
NEUTROPHILS NFR BLD: 85.7 % (ref 38–73)
NONHDLC SERPL-MCNC: 79 MG/DL
NRBC BLD-RTO: 0 /100 WBC
NUM UNITS TRANS PACKED RBC: NORMAL
PLATELET # BLD AUTO: 170 K/UL (ref 150–450)
PLATELET # BLD AUTO: 181 K/UL (ref 150–450)
PLATELET # BLD AUTO: 204 K/UL (ref 150–450)
PLATELET BLD QL SMEAR: ABNORMAL
PMV BLD AUTO: 10.4 FL (ref 9.2–12.9)
PMV BLD AUTO: 10.8 FL (ref 9.2–12.9)
PMV BLD AUTO: 10.9 FL (ref 9.2–12.9)
POCT GLUCOSE: 128 MG/DL (ref 70–110)
POCT GLUCOSE: 162 MG/DL (ref 70–110)
POCT GLUCOSE: 163 MG/DL (ref 70–110)
POCT GLUCOSE: 180 MG/DL (ref 70–110)
POCT GLUCOSE: 188 MG/DL (ref 70–110)
POCT GLUCOSE: 223 MG/DL (ref 70–110)
POCT GLUCOSE: 227 MG/DL (ref 70–110)
POTASSIUM SERPL-SCNC: 4.5 MMOL/L (ref 3.5–5.1)
POTASSIUM SERPL-SCNC: 6.1 MMOL/L (ref 3.5–5.1)
POTASSIUM SERPL-SCNC: 6.1 MMOL/L (ref 3.5–5.1)
RBC # BLD AUTO: 2.33 M/UL (ref 4.6–6.2)
RBC # BLD AUTO: 2.89 M/UL (ref 4.6–6.2)
RBC # BLD AUTO: 2.94 M/UL (ref 4.6–6.2)
SODIUM SERPL-SCNC: 141 MMOL/L (ref 136–145)
TRIGL SERPL-MCNC: 104 MG/DL (ref 30–150)
TROPONIN I SERPL DL<=0.01 NG/ML-MCNC: 3.54 NG/ML (ref 0–0.03)
TROPONIN I SERPL DL<=0.01 NG/ML-MCNC: 3.79 NG/ML (ref 0–0.03)
TROPONIN I SERPL DL<=0.01 NG/ML-MCNC: 4.21 NG/ML (ref 0–0.03)
WBC # BLD AUTO: 13.27 K/UL (ref 3.9–12.7)
WBC # BLD AUTO: 17.57 K/UL (ref 3.9–12.7)
WBC # BLD AUTO: 22.26 K/UL (ref 3.9–12.7)

## 2023-08-19 PROCEDURE — 80061 LIPID PANEL: CPT | Performed by: HOSPITALIST

## 2023-08-19 PROCEDURE — C9113 INJ PANTOPRAZOLE SODIUM, VIA: HCPCS | Performed by: HOSPITALIST

## 2023-08-19 PROCEDURE — 36430 TRANSFUSION BLD/BLD COMPNT: CPT

## 2023-08-19 PROCEDURE — 87340 HEPATITIS B SURFACE AG IA: CPT | Performed by: STUDENT IN AN ORGANIZED HEALTH CARE EDUCATION/TRAINING PROGRAM

## 2023-08-19 PROCEDURE — 99233 SBSQ HOSP IP/OBS HIGH 50: CPT | Mod: GC,,, | Performed by: INTERNAL MEDICINE

## 2023-08-19 PROCEDURE — 80048 BASIC METABOLIC PNL TOTAL CA: CPT | Performed by: HOSPITALIST

## 2023-08-19 PROCEDURE — 25000003 PHARM REV CODE 250: Performed by: HOSPITALIST

## 2023-08-19 PROCEDURE — 99232 SBSQ HOSP IP/OBS MODERATE 35: CPT | Mod: ,,, | Performed by: STUDENT IN AN ORGANIZED HEALTH CARE EDUCATION/TRAINING PROGRAM

## 2023-08-19 PROCEDURE — 90935 HEMODIALYSIS ONE EVALUATION: CPT

## 2023-08-19 PROCEDURE — 99223 1ST HOSP IP/OBS HIGH 75: CPT | Mod: GC,,, | Performed by: INTERNAL MEDICINE

## 2023-08-19 PROCEDURE — 20600001 HC STEP DOWN PRIVATE ROOM

## 2023-08-19 PROCEDURE — 84132 ASSAY OF SERUM POTASSIUM: CPT | Performed by: HOSPITALIST

## 2023-08-19 PROCEDURE — 83036 HEMOGLOBIN GLYCOSYLATED A1C: CPT | Performed by: HOSPITALIST

## 2023-08-19 PROCEDURE — 99222 1ST HOSP IP/OBS MODERATE 55: CPT | Mod: ,,, | Performed by: NURSE PRACTITIONER

## 2023-08-19 PROCEDURE — 85025 COMPLETE CBC W/AUTO DIFF WBC: CPT | Performed by: HOSPITALIST

## 2023-08-19 PROCEDURE — 99232 PR SUBSEQUENT HOSPITAL CARE,LEVL II: ICD-10-PCS | Mod: ,,, | Performed by: STUDENT IN AN ORGANIZED HEALTH CARE EDUCATION/TRAINING PROGRAM

## 2023-08-19 PROCEDURE — 84484 ASSAY OF TROPONIN QUANT: CPT | Performed by: HOSPITALIST

## 2023-08-19 PROCEDURE — 63600175 PHARM REV CODE 636 W HCPCS: Performed by: HOSPITALIST

## 2023-08-19 PROCEDURE — P9016 RBC LEUKOCYTES REDUCED: HCPCS | Performed by: HOSPITALIST

## 2023-08-19 PROCEDURE — 80048 BASIC METABOLIC PNL TOTAL CA: CPT | Mod: 91 | Performed by: STUDENT IN AN ORGANIZED HEALTH CARE EDUCATION/TRAINING PROGRAM

## 2023-08-19 PROCEDURE — 25000003 PHARM REV CODE 250: Performed by: STUDENT IN AN ORGANIZED HEALTH CARE EDUCATION/TRAINING PROGRAM

## 2023-08-19 PROCEDURE — 99223 PR INITIAL HOSPITAL CARE,LEVL III: ICD-10-PCS | Mod: GC,,, | Performed by: INTERNAL MEDICINE

## 2023-08-19 PROCEDURE — 99233 PR SUBSEQUENT HOSPITAL CARE,LEVL III: ICD-10-PCS | Mod: GC,,, | Performed by: INTERNAL MEDICINE

## 2023-08-19 PROCEDURE — 85025 COMPLETE CBC W/AUTO DIFF WBC: CPT | Mod: 91 | Performed by: STUDENT IN AN ORGANIZED HEALTH CARE EDUCATION/TRAINING PROGRAM

## 2023-08-19 PROCEDURE — 99222 PR INITIAL HOSPITAL CARE,LEVL II: ICD-10-PCS | Mod: ,,, | Performed by: NURSE PRACTITIONER

## 2023-08-19 RX ORDER — HYDROCODONE BITARTRATE AND ACETAMINOPHEN 500; 5 MG/1; MG/1
TABLET ORAL
Status: DISCONTINUED | OUTPATIENT
Start: 2023-08-19 | End: 2023-08-22 | Stop reason: HOSPADM

## 2023-08-19 RX ORDER — SODIUM CHLORIDE 9 MG/ML
INJECTION, SOLUTION INTRAVENOUS ONCE
Status: COMPLETED | OUTPATIENT
Start: 2023-08-19 | End: 2023-08-19

## 2023-08-19 RX ORDER — SODIUM CHLORIDE 9 MG/ML
INJECTION, SOLUTION INTRAVENOUS ONCE
Status: DISCONTINUED | OUTPATIENT
Start: 2023-08-19 | End: 2023-08-19

## 2023-08-19 RX ORDER — LIDOCAINE AND PRILOCAINE 25; 25 MG/G; MG/G
CREAM TOPICAL
Status: DISCONTINUED | OUTPATIENT
Start: 2023-08-19 | End: 2023-08-22 | Stop reason: HOSPADM

## 2023-08-19 RX ORDER — CALCIUM GLUCONATE 20 MG/ML
1 INJECTION, SOLUTION INTRAVENOUS EVERY 10 MIN PRN
Status: DISCONTINUED | OUTPATIENT
Start: 2023-08-19 | End: 2023-08-21

## 2023-08-19 RX ORDER — CALCIUM GLUCONATE 20 MG/ML
1 INJECTION, SOLUTION INTRAVENOUS ONCE
Status: DISCONTINUED | OUTPATIENT
Start: 2023-08-19 | End: 2023-08-21

## 2023-08-19 RX ORDER — ATORVASTATIN CALCIUM 40 MG/1
40 TABLET, FILM COATED ORAL NIGHTLY
Status: DISCONTINUED | OUTPATIENT
Start: 2023-08-19 | End: 2023-08-22 | Stop reason: HOSPADM

## 2023-08-19 RX ORDER — ALBUTEROL SULFATE 2.5 MG/.5ML
10 SOLUTION RESPIRATORY (INHALATION) ONCE
Status: DISCONTINUED | OUTPATIENT
Start: 2023-08-19 | End: 2023-08-19

## 2023-08-19 RX ORDER — POLYETHYLENE GLYCOL 3350, SODIUM SULFATE ANHYDROUS, SODIUM BICARBONATE, SODIUM CHLORIDE, POTASSIUM CHLORIDE 236; 22.74; 6.74; 5.86; 2.97 G/4L; G/4L; G/4L; G/4L; G/4L
4000 POWDER, FOR SOLUTION ORAL ONCE
Status: COMPLETED | OUTPATIENT
Start: 2023-08-20 | End: 2023-08-20

## 2023-08-19 RX ORDER — HEPARIN SODIUM 1000 [USP'U]/ML
1000 INJECTION, SOLUTION INTRAVENOUS; SUBCUTANEOUS
Status: DISCONTINUED | OUTPATIENT
Start: 2023-08-19 | End: 2023-08-22 | Stop reason: HOSPADM

## 2023-08-19 RX ADMIN — DEXTROSE MONOHYDRATE 250 ML: 100 INJECTION, SOLUTION INTRAVENOUS at 12:08

## 2023-08-19 RX ADMIN — DORZOLAMIDE 1 DROP: 20 SOLUTION/ DROPS OPHTHALMIC at 12:08

## 2023-08-19 RX ADMIN — INSULIN HUMAN 6 UNITS: 100 INJECTION, SOLUTION PARENTERAL at 12:08

## 2023-08-19 RX ADMIN — GUAIFENESIN AND DEXTROMETHORPHAN HYDROBROMIDE 1 TABLET: 600; 30 TABLET, EXTENDED RELEASE ORAL at 10:08

## 2023-08-19 RX ADMIN — PANTOPRAZOLE SODIUM 40 MG: 40 INJECTION, POWDER, FOR SOLUTION INTRAVENOUS at 10:08

## 2023-08-19 RX ADMIN — CALCIUM GLUCONATE 1 G: 20 INJECTION, SOLUTION INTRAVENOUS at 09:08

## 2023-08-19 RX ADMIN — ATORVASTATIN CALCIUM 40 MG: 40 TABLET, FILM COATED ORAL at 01:08

## 2023-08-19 RX ADMIN — CALCIUM GLUCONATE 1 G: 20 INJECTION, SOLUTION INTRAVENOUS at 01:08

## 2023-08-19 RX ADMIN — CARVEDILOL 25 MG: 25 TABLET, FILM COATED ORAL at 10:08

## 2023-08-19 RX ADMIN — ATORVASTATIN CALCIUM 40 MG: 40 TABLET, FILM COATED ORAL at 10:08

## 2023-08-19 RX ADMIN — TIMOLOL MALEATE 1 DROP: 5 SOLUTION/ DROPS OPHTHALMIC at 12:08

## 2023-08-19 RX ADMIN — DORZOLAMIDE 1 DROP: 20 SOLUTION/ DROPS OPHTHALMIC at 10:08

## 2023-08-19 RX ADMIN — TIMOLOL MALEATE 1 DROP: 5 SOLUTION/ DROPS OPHTHALMIC at 10:08

## 2023-08-19 RX ADMIN — SODIUM CHLORIDE: 9 INJECTION, SOLUTION INTRAVENOUS at 10:08

## 2023-08-19 RX ADMIN — CALCIUM ACETATE 667 MG: 667 CAPSULE ORAL at 05:08

## 2023-08-19 RX ADMIN — DEXTROSE MONOHYDRATE 10 MILLION UNITS: 5 INJECTION, SOLUTION INTRAVENOUS at 05:08

## 2023-08-19 NOTE — HPI
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  No diaphoresis, dyspnea, or nausea/vomiting noted.  EKG had ST elevation in AVR with depressions in inferior, anterior, and lateral leads.  Findings were felt to be consistent with STEMI.  He received sublingual nitroglycerin, aspirin, ticagrelor, and heparin bolus/infusion.  He was subsequently transferred to the [place] ED.     At [facility] - He was seen by Cardiology in the emergency department, and it was noted that his hemoglobin was 6.1.  It was felt that his findings were more likely a type 2 NSTEMI.  Heparin was discontinued, and 2 units packed red blood cells were given.  Troponin levels were 0.064 with repeat 0.094.  Stool for occult blood was positive, and he did report dark stool recently.  He is having intermittent chest pain but appears comfortable. In the emergency department he received sublingual nitroglycerin and IV morphine. With the symptomatic anemia, cardiac issues, and heme positive stool, ED spoke with Cardiology at [facility].  After reviewing the case was felt that urgent cardiac catheterization was not indicated.  Will plan transfer to Hospital Medicine at [facility] in step-down status for further treatment of symptomatic anemia/possible GI bleed.     Previous admissions this year at outside hospitals:  -March 1-2 with concern for temporal arteritis.  Treated with steroids with plans for temporal artery biopsy.  He subsequently had outpatient temporal artery biopsy on March 7 that had no  "significant increase in inflammation.  He was in the process of weaning off steroids.    -March 27-29 with atrial flutter treated medically.  -April 11 for elective cardioversion of atrial flutter.  He had no intracardiac thrombus on WERNER, and he was cardioverted to sinus rhythm.  He was treated with Eliquis.  -April 26-27 with chest pain/NSTEMI due showed FT depression in leads I, V4, V5, and V6 with ST elevation in AVR.  It was noted that these findings were seen on previous EKGs.  Hemoglobin was noted to be 8.3, and he received packed red blood cells with dialysis.  -April 8-10 with concern for neurosyphilis.  Infectious Diseases recommended lumbar puncture and PICC line placement for IV penicillin to treat suspected neurosyphilis.  He had lumbar puncture on August 8.  Recommendation was for 14 days of IV penicillin (end date around August 22).       BEDSIDE EVAL    Patient seen these evening, he is in bed in no distress, reports no acute complaints, reviewed events of today and plan for hospitalization.  He received 2 units PRBC prior to arrival - he is on 2L NC which is new.  At bedside removed Oxygen since patient on continuous bedside monitor initially sats no lower than 95 but patient visibly uncomfortable after oxygen removed, asked to sit up and then be reclined, stated he felt tighntess or "cold" in his throat.   His chest pain he initially presented with is improved but not fully resolved, reports symptom as minor.     He reports seeing dark/black stools in recent days, denies any hematemesis, no abdominal pain, taking eliquis as outpatient.  Last dose of Eliquis was Wed 8/16    He denies prior hx of MI or any prior stent placements.   Repeat EKG is obtained and his ST depressions have resolved, sTAT labs obtained and Potassium is 5.8,  T-waves more hyperacute than last EKG performed today.  NO St elevations noted.  Patient lives with wife, performs all ADL at home, does not use assistive device, not " working currenty.  Denies any Tobacco/ETOH/Drug use.

## 2023-08-19 NOTE — ASSESSMENT & PLAN NOTE
-on epmiric therapy from recent August admit @ [facility], hx of treated syphilis in 2013, but recent RPR/FTA positive studies.   -PICC line placed for continuous home infusion Pen G 8million units/daily End date is 08/23/23. Appreciate clinical pharmD assistance. Continue Pen G 10million units continuous infusion.

## 2023-08-19 NOTE — ASSESSMENT & PLAN NOTE
Patient is an 83 year old male with anemia, ESRD on HD, HFrEF( most recent EF 30%), atrial flutter on Eliquis status post DCCV April 2023, neurosyphilis, HLD, HTN presented to [facility] on August 18 with chest discomfort. Transferred to [place] with Cardiology consultation, and then transferred to [facility] for higher level of care. ECG 08/18/2023 at 6 AM independently reviewed, showed diffuse ST depressions in the anterior, inferior, and lateral leads with aVR elevation. ECG 08/19/2023 independently reviewed, showed NST, 1st degree AV block, and complete resolution of ST-T changes. Patient continues to report being angina free. Patient had troponin elevation 0.094 --> 2.88--> 3.79 --> 4.21. While biomarker elevation is concerning for underlying coronary artery disease, this presentation is not consistent with acute plaque rupture. This presentation is likely a Type II MI secondary to demand ischemia from acute blood loss anemia (Hgb 6.1 g/dL at OSH).     - Agree with GI consultation to evaluate etiology of acute blood loss anemia  - Echo was already ordered, will follow  - Recommend continuing anti-anginal therapy with beta - blocker; ideally he should also be started on aspirin for anti-anginal therapy however this appears to be on hold at this time due to the concerns of acute blood loss anemia. Would recommend restarting aspirin 81 mg PO Daily.   - Patient is already at target LDL < 70 mg/dL (currently 58 mg/dL) without statin

## 2023-08-19 NOTE — ASSESSMENT & PLAN NOTE
-patient last had HD on Wednesday  -Remains elevated despite shifting and IV lasix  -HD this AM; EKG was pending prior to Pt rolling down to HD suite  -Repeat BMP following HD session   -Tele

## 2023-08-19 NOTE — NURSING
Nurses Note -- 4 Eyes      8/18/2023   8:21 PM      Skin assessed during: Admit      [x] No Altered Skin Integrity Present    []Prevention Measures Documented      [] Yes- Altered Skin Integrity Present or Discovered   [] LDA Added if Not in Epic (Describe Wound)   [] New Altered Skin Integrity was Present on Admit and Documented in LDA   [] Wound Image Taken    Wound Care Consulted? No    Attending Nurse:   Judy Barclay RN     Second RN/Staff Member:   Goldy Thomas RN

## 2023-08-19 NOTE — ASSESSMENT & PLAN NOTE
-patient last had HD on Wednesday, 5am potassium was 4.8 and has received 2 units PRBC  Now potassium is 5.8,  EKG does have some potential hyperacute t-wave changes, also in the setting of an NSTEMI will place orders to control potassium  -Give 80mg IV lasix now  -Give 6units IV insulin regular and Dextrose per orderset  -Telemetry ordered  -

## 2023-08-19 NOTE — PROGRESS NOTES
[facility] - Int[facility] Care ([address] Medicine  Progress Note    Patient Name: Margaret Hernández Jr.  MRN: [id number]  Patient Class: IP- Inpatient   Admission Date: 8/18/2023  Length of Stay: 1 days  Attending Physician: Marsha Dixon MD  Primary Care Provider: Stephanie Hernandez NP        Subjective:     Principal Problem:Type 2 MI (myocardial infarction)        HPI:  TRANSFER CENTER PHYSICIAN SUMMARY  83-year-old male with a history of anemia, end-stage renal disease on hemodialysis (most recent dialysis on August 16), HFrEF, atrial flutter (on Eliquis) with DCCV, syphilis (treated in 2016), hyperlipidemia, hypertension, glaucoma, and recent admission to [facility] for 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  No diaphoresis, dyspnea, or nausea/vomiting noted.  EKG had ST elevation in AVR with depressions in inferior, anterior, and lateral leads.  Findings were felt to be consistent with STEMI.  He received sublingual nitroglycerin, aspirin, ticagrelor, and heparin bolus/infusion.  He was subsequently transferred to the [place] ED.     At [facility] - He was seen by Cardiology in the emergency department, and it was noted that his hemoglobin was 6.1.  It was felt that his findings were more likely a type 2 NSTEMI.  Heparin was discontinued, and 2 units packed red blood cells were given.  Troponin levels were 0.064 with repeat 0.094.  Stool for occult blood was positive, and he did report dark stool recently.  He is having intermittent chest pain but appears comfortable. In the emergency department he received sublingual nitroglycerin and IV morphine. With the symptomatic anemia, cardiac issues, and heme positive stool, ED spoke with Cardiology at [facility].  After reviewing the case was felt that urgent cardiac catheterization was not indicated.  Will plan  "transfer to Hospital Medicine at [facility] in step-down status for further treatment of symptomatic anemia/possible GI bleed.     Previous admissions this year at outside hospitals:  -March 1-2 with concern for temporal arteritis.  Treated with steroids with plans for temporal artery biopsy.  He subsequently had outpatient temporal artery biopsy on March 7 that had no significant increase in inflammation.  He was in the process of weaning off steroids.    -March 27-29 with atrial flutter treated medically.  -April 11 for elective cardioversion of atrial flutter.  He had no intracardiac thrombus on WERNER, and he was cardioverted to sinus rhythm.  He was treated with Eliquis.  -April 26-27 with chest pain/NSTEMI due showed FT depression in leads I, V4, V5, and V6 with ST elevation in AVR.  It was noted that these findings were seen on previous EKGs.  Hemoglobin was noted to be 8.3, and he received packed red blood cells with dialysis.  -April 8-10 with concern for neurosyphilis.  Infectious Diseases recommended lumbar puncture and PICC line placement for IV penicillin to treat suspected neurosyphilis.  He had lumbar puncture on August 8.  Recommendation was for 14 days of IV penicillin (end date around August 22).       BEDSIDE EVAL    Patient seen these evening, he is in bed in no distress, reports no acute complaints, reviewed events of today and plan for hospitalization.  He received 2 units PRBC prior to arrival - he is on 2L NC which is new.  At bedside removed Oxygen since patient on continuous bedside monitor initially sats no lower than 95 but patient visibly uncomfortable after oxygen removed, asked to sit up and then be reclined, stated he felt tighntess or "cold" in his throat.   His chest pain he initially presented with is improved but not fully resolved, reports symptom as minor.     He reports seeing dark/black stools in recent days, denies any hematemesis, no abdominal pain, taking eliquis as " outpatient.  Last dose of Eliquis was Wed 8/16    He denies prior hx of MI or any prior stent placements.   Repeat EKG is obtained and his ST depressions have resolved, sTAT labs obtained and Potassium is 5.8,  T-waves more hyperacute than last EKG performed today.  NO St elevations noted.  Patient lives with wife, performs all ADL at home, does not use assistive device, not working currenty.  Denies any Tobacco/ETOH/Drug use.       Overview/Hospital Course:  No notes on file    Interval History:  Patient seen and examined at bedside.    No acute events overnight, however K remains elevated; Hgb now >7.   Patient denies ongoing CP. Endorses generalized weakness and chest congestion.  Small volume urine this AM. Heading down to HD following interview; Ca gluc administered, shifting and lokelma deferred. EKG was pending.   Per nursing, episode of melena this AM.   Patient updated regarding care plan.      Review of Systems   Constitutional:  Positive for activity change, appetite change and fatigue. Negative for chills.   HENT:  Positive for congestion. Negative for sore throat and trouble swallowing.    Respiratory:  Positive for cough. Negative for shortness of breath.    Cardiovascular:  Negative for palpitations and leg swelling.   Gastrointestinal:  Negative for abdominal pain, nausea and vomiting.   Skin:  Negative for rash.   Neurological:  Positive for weakness. Negative for headaches.   Psychiatric/Behavioral: Negative.         Objective:    Temp: 98.3 °F (36.8 °C) (08/19/23 0750)  Pulse: 106 (08/19/23 1109)  Resp: 18 (08/19/23 0750)  BP: 135/61 (08/19/23 1000)  SpO2: 97 % (08/19/23 0750)    Weight: 61.2 kg (134 lb 14.7 oz) (08/18/23 2107)    Body mass index is 21.13 kg/m².    Physical Exam  Vitals and nursing note reviewed.   Constitutional:       Appearance: He is ill-appearing. He is not toxic-appearing or diaphoretic.      Interventions: Nasal cannula in place.      Comments: RUE PICC LINE   HENT:       Head: Normocephalic and atraumatic.      Mouth/Throat:      Mouth: Mucous membranes are moist.      Pharynx: Oropharynx is clear.   Eyes:      General: No scleral icterus.        Right eye: No discharge.         Left eye: No discharge.   Cardiovascular:      Rate and Rhythm: Normal rate.      Pulses: Normal pulses.      Heart sounds: Murmur heard.      Comments: LUE AVF with thrill/bruit  Pulmonary:      Effort: Pulmonary effort is normal. No respiratory distress.      Breath sounds: Normal breath sounds. No wheezing or rales.   Abdominal:      General: Bowel sounds are normal. There is no distension.      Palpations: Abdomen is soft.      Tenderness: There is no abdominal tenderness. There is no guarding or rebound.   Musculoskeletal:      Cervical back: Neck supple. No tenderness.      Right lower leg: No edema.      Left lower leg: No edema.   Lymphadenopathy:      Cervical: No cervical adenopathy.   Skin:     General: Skin is warm.      Findings: No rash.   Neurological:      General: No focal deficit present.      Mental Status: He is alert and oriented to person, place, and time.   Psychiatric:         Behavior: Behavior normal.         Significant Labs: All pertinent labs within the past 24 hours have been reviewed.    Recent Results (from the past 24 hour(s))   CBC Without Differential    Collection Time: 08/18/23  9:45 PM   Result Value Ref Range    WBC 20.83 (H) 3.90 - 12.70 K/uL    RBC 2.59 (L) 4.60 - 6.20 M/uL    Hemoglobin 7.6 (L) 14.0 - 18.0 g/dL    Hematocrit 23.8 (L) 40.0 - 54.0 %    MCV 92 82 - 98 fL    MCH 29.3 27.0 - 31.0 pg    MCHC 31.9 (L) 32.0 - 36.0 g/dL    RDW 17.9 (H) 11.5 - 14.5 %    Platelets 212 150 - 450 K/uL    MPV 10.7 9.2 - 12.9 fL   Renal Function Panel    Collection Time: 08/18/23  9:45 PM   Result Value Ref Range    Glucose 140 (H) 70 - 110 mg/dL    Sodium 139 136 - 145 mmol/L    Potassium 5.8 (H) 3.5 - 5.1 mmol/L    Chloride 95 95 - 110 mmol/L    CO2 26 23 - 29 mmol/L    BUN 85  (H) 8 - 23 mg/dL    Calcium 9.0 8.7 - 10.5 mg/dL    Creatinine 9.5 (H) 0.5 - 1.4 mg/dL    Albumin 2.3 (L) 3.5 - 5.2 g/dL    Phosphorus 4.6 (H) 2.7 - 4.5 mg/dL    eGFR 4.9 (A) >60 mL/min/1.73 m^2    Anion Gap 18 (H) 8 - 16 mmol/L   Lactic Acid, Plasma    Collection Time: 08/18/23  9:45 PM   Result Value Ref Range    Lactate (Lactic Acid) 1.0 0.5 - 2.2 mmol/L   Troponin I    Collection Time: 08/18/23  9:45 PM   Result Value Ref Range    Troponin I 2.884 (H) 0.000 - 0.026 ng/mL   Magnesium    Collection Time: 08/18/23  9:45 PM   Result Value Ref Range    Magnesium 2.2 1.6 - 2.6 mg/dL   APTT    Collection Time: 08/18/23  9:45 PM   Result Value Ref Range    aPTT 25.5 21.0 - 32.0 sec   Protime-INR    Collection Time: 08/18/23  9:45 PM   Result Value Ref Range    Prothrombin Time 11.0 9.0 - 12.5 sec    INR 1.0 0.8 - 1.2   Type & Screen    Collection Time: 08/18/23  9:45 PM   Result Value Ref Range    Group & Rh A POS     Indirect Rosa M NEG     Specimen Outdate 08/21/2023 23:59    Iron and TIBC    Collection Time: 08/18/23  9:45 PM   Result Value Ref Range    Iron 32 (L) 45 - 160 ug/dL    Transferrin 186 (L) 200 - 375 mg/dL    TIBC 275 250 - 450 ug/dL    Saturated Iron 12 (L) 20 - 50 %   Ferritin    Collection Time: 08/18/23  9:45 PM   Result Value Ref Range    Ferritin 2,625 (H) 20.0 - 300.0 ng/mL   Folate    Collection Time: 08/18/23  9:45 PM   Result Value Ref Range    Folate 11.7 4.0 - 24.0 ng/mL   Vitamin B12    Collection Time: 08/18/23  9:45 PM   Result Value Ref Range    Vitamin B-12 625 210 - 950 pg/mL   Prepare RBC 1 Unit    Collection Time: 08/18/23  9:45 PM   Result Value Ref Range    UNIT NUMBER [id number]     Product Code [id number]     DISPENSE STATUS ISSUED     CODING SYSTEM [id number]     Unit Blood Type Code 0600     Unit Blood Type A NEG     Unit Expiration [id number]     CROSSMATCH INTERPRETATION Compatible    POCT glucose    Collection Time: 08/18/23 11:58 PM   Result Value Ref Range    POCT Glucose  223 (H) 70 - 110 mg/dL   POCT glucose    Collection Time: 08/19/23  1:22 AM   Result Value Ref Range    POCT Glucose 227 (H) 70 - 110 mg/dL   POCT glucose    Collection Time: 08/19/23  4:11 AM   Result Value Ref Range    POCT Glucose 180 (H) 70 - 110 mg/dL   Hemoglobin A1c if not done in past 3 months    Collection Time: 08/19/23  5:33 AM   Result Value Ref Range    Hemoglobin A1C 5.1 4.0 - 5.6 %    Estimated Avg Glucose 100 68 - 131 mg/dL   Potassium    Collection Time: 08/19/23  5:33 AM   Result Value Ref Range    Potassium 6.1 (H) 3.5 - 5.1 mmol/L   Troponin I    Collection Time: 08/19/23  5:33 AM   Result Value Ref Range    Troponin I 3.793 (H) 0.000 - 0.026 ng/mL   Lipid panel    Collection Time: 08/19/23  5:33 AM   Result Value Ref Range    Cholesterol 106 (L) 120 - 199 mg/dL    Triglycerides 104 30 - 150 mg/dL    HDL 27 (L) 40 - 75 mg/dL    LDL Cholesterol 58.2 (L) 63.0 - 159.0 mg/dL    HDL/Cholesterol Ratio 25.5 20.0 - 50.0 %    Total Cholesterol/HDL Ratio 3.9 2.0 - 5.0    Non-HDL Cholesterol 79 mg/dL   CBC Auto Differential    Collection Time: 08/19/23  5:33 AM   Result Value Ref Range    WBC 17.57 (H) 3.90 - 12.70 K/uL    RBC 2.89 (L) 4.60 - 6.20 M/uL    Hemoglobin 8.5 (L) 14.0 - 18.0 g/dL    Hematocrit 25.5 (L) 40.0 - 54.0 %    MCV 88 82 - 98 fL    MCH 29.4 27.0 - 31.0 pg    MCHC 33.3 32.0 - 36.0 g/dL    RDW 17.2 (H) 11.5 - 14.5 %    Platelets 181 150 - 450 K/uL    MPV 10.8 9.2 - 12.9 fL    Immature Granulocytes 0.5 0.0 - 0.5 %    Gran # (ANC) 15.1 (H) 1.8 - 7.7 K/uL    Immature Grans (Abs) 0.08 (H) 0.00 - 0.04 K/uL    Lymph # 0.9 (L) 1.0 - 4.8 K/uL    Mono # 1.5 (H) 0.3 - 1.0 K/uL    Eos # 0.0 0.0 - 0.5 K/uL    Baso # 0.03 0.00 - 0.20 K/uL    nRBC 0 0 /100 WBC    Gran % 85.7 (H) 38.0 - 73.0 %    Lymph % 5.2 (L) 18.0 - 48.0 %    Mono % 8.4 4.0 - 15.0 %    Eosinophil % 0.0 0.0 - 8.0 %    Basophil % 0.2 0.0 - 1.9 %    Differential Method Automated    POCT glucose    Collection Time: 08/19/23  8:03 AM    Result Value Ref Range    POCT Glucose 188 (H) 70 - 110 mg/dL   Potassium    Collection Time: 08/19/23  8:30 AM   Result Value Ref Range    Potassium 6.1 (H) 3.5 - 5.1 mmol/L       Significant Imaging: I have reviewed all pertinent imaging results/findings within the past 24 hours.            Assessment/Plan:      * Type 2 MI (myocardial infarction)  Etiology of Type 2 MI thought to be secondary to Symptomatic anemia  --Trend Troponin here 2.884->3.79  -EKG prn for chest pain  -Continue outpatient Coreg BID,  Consider adding nitrates either Nitropaste or oral ISDN pending trend of blood pressure  -Will hold Aspirin, Apixaban and further anti-platelet therapies pending completion of GI workup to r/o occult source of bleeding  -Obtain ECHO  -Consult Cardiology    Acute respiratory failure with hypoxia  Patient with Hypoxic Respiratory failure which is Acute.  he is not on home oxygen. Supplemental oxygen was provided and noted-      .   Signs/symptoms of respiratory failure include- tachypnea and increased work of breathing. Contributing diagnoses includes - CHF and ESRD and Recent PRBC transfusion Labs and images were reviewed. Patient Has not had a recent ABG. Will treat underlying causes and adjust management of respiratory failure as follows-     · Obtained repeat CXR on arrival: central vascular congestion.  No significant change in cardiopulmonary status, noting continued mild increased perihilar interstitial attenuation, similar to prior examination.  No new large confluent airspace consolidation or pneumothorax identified  · Continue supplemental oxygen - pt symptomatic without it, sats down to 93% after 10min of O2 removal.   · Wean supplemental oxygen as tolerated  · HD today      Hyperkalemia  -patient last had HD on Wednesday  -Remains elevated despite shifting and IV lasix  -HD this AM; EKG was pending prior to Pt rolling down to HD suite  -Repeat BMP following HD session   -Tele    Neurosyphilis in  adult  -on epmiric therapy from recent August admit @ [facility], hx of treated syphilis in 2013, but recent RPR/FTA positive studies.   -PICC line placed for continuous home infusion Pen G 8million units/daily End date is 08/23/23. Appreciate clinical pharmD assistance. Continue Pen G 10million units continuous infusion.      HFrEF (heart failure with reduced ejection fraction)  Chronic stable  -continue Diuretics Lasix on non-HD days - tue/thu/sat.   Pending response with GI bleed w/u and NSTEMI - change to a BID(Tue/Thu/Sat/Sun) frequency per home med rec dosing.    Atrial flutter  -patient in sinus rhythm-rate controlled., continue cardiac monitoring and coreg  -Currently Apixaban is contraindicated due to concern for possible GI bleeding.       Symptomatic anemia  -FOBT positive at [facility] ED, s/p 2unit PRBC  -Hgb 8.5  -CBC TID as ongoing melena this AM  -CP with improvement but ongoing generalized weakness  -pt with ESRD has anemia related to CKD also   -NPO pending GI consultation       Type 2 diabetes mellitus with chronic kidney disease on chronic dialysis, without long-term current use of insulin  Patient's FSGs are controlled on current medication regimen.  Last A1c reviewed-   Lab Results   Component Value Date    HGBA1C 5.1 08/19/2023     Most recent fingerstick glucose reviewed-   Recent Labs   Lab 08/18/23  2358 08/19/23  0122 08/19/23  0411 08/19/23  0803   POCTGLUCOSE 223* 227* 180* 188*     Current correctional scale  Low  Maintain anti-hyperglycemic dose as follows-   Antihyperglycemics (From admission, onward)    Start     Stop Route Frequency Ordered    08/18/23 2211  insulin aspart U-100 pen 0-5 Units         -- SubQ Before meals & nightly PRN 08/18/23 2111        Hold Oral hypoglycemics while patient is in the hospital.    ESRD on dialysis  Last HD reported in EMR as on 8/16,  Missed scheduled dialysis today  -continue relevant home medications  -COnsult Nephrology for continued  inpatient HD treatments  -HD this morning      Primary hypertension  chornic-uncontrolled  -Continue Carvedilol  -will add short acting nitrates pending patient's trend - has received multiple doses 0.4mg SL NTG today (4 total)        VTE Risk Mitigation (From admission, onward)         Ordered     heparin (porcine) injection 1,000 Units  As needed (PRN)         08/19/23 0718     Reason for No Pharmacological VTE Prophylaxis  Once        Question:  Reasons:  Answer:  Risk of Bleeding    08/18/23 2104     IP VTE HIGH RISK PATIENT  Once         08/18/23 2104     Place sequential compression device  Until discontinued         08/18/23 2104                Discharge Planning   GABE: 8/22/2023     Code Status: Full Code   Is the patient medically ready for discharge?: No    Reason for patient still in hospital (select all that apply): Patient unstable, Patient trending condition, Laboratory test, Treatment, Imaging, Consult recommendations, PT / OT recommendations and Pending disposition                     Marsha Dixon MD  Department of Hospital Medicine   [facility] - Intensive Care ([place]-)

## 2023-08-19 NOTE — PLAN OF CARE
Problem: Adult Inpatient Plan of Care  Goal: Plan of Care Review  Outcome: Ongoing, Not Progressing  Goal: Patient-Specific Goal (Individualized)  Outcome: Ongoing, Not Progressing  Goal: Absence of Hospital-Acquired Illness or Injury  Outcome: Ongoing, Not Progressing  Goal: Optimal Comfort and Wellbeing  Outcome: Ongoing, Not Progressing  Goal: Readiness for Transition of Care  Outcome: Ongoing, Not Progressing     Went to HD today. Potassium after dialysis was 4.5. 10 beat run of Vtach on tele monitor, MD notified. Trending troponin. Hgb 8.7 this afternoon. Intermittent afib rhythm. BP after dialysis 108/47. C/o weakness and fatigue. Ambulated in room with x1 assist. Diet orders placed today but will be NPO at midnight; plan to start golytely tomorrow. Two episodes of melena total today. Plan of care reviewed.

## 2023-08-19 NOTE — SUBJECTIVE & OBJECTIVE
Past Medical History:   Diagnosis Date    Abdominal hernia     Anemia     Chronic kidney disease     ED (erectile dysfunction)     Glaucoma     Hard of hearing     Hyperlipidemia     Hypertension     Sinus congestion        Past Surgical History:   Procedure Laterality Date    AV FISTULA PLACEMENT Left 04/09/2018    BIOPSY OF TEMPORAL ARTERY Left 03/07/2023    Procedure: BIOPSY, ARTERY, TEMPORAL;  Surgeon: Yovany Sol MD;  [facility];  Service: Cardiovascular;  Laterality: Left;  carotid artery    CATARACT EXTRACTION      FISTULOGRAM Left 01/14/2020    Procedure: Fistulogram;  Surgeon: Milton Renee MD;  Location: [facility] CATH LAB;  Service: Peripheral Vascular;  Laterality: Left;    FISTULOGRAM Left 03/10/2020    Procedure: Fistulogram;  Surgeon: Milton Renee MD;  Location: [facility] CATH LAB;  Service: Peripheral Vascular;  Laterality: Left;    FISTULOGRAM Left 06/16/2020    Procedure: Fistulogram;  Surgeon: Milton Renee MD;  Location: [facility] CATH LAB;  Service: Peripheral Vascular;  Laterality: Left;    FISTULOGRAM Left 02/08/2022    Procedure: Fistulogram;  Surgeon: Milton Renee MD;  Location: [facility] CATH LAB;  Service: Peripheral Vascular;  Laterality: Left;    HERNIA REPAIR      PERCUTANEOUS TRANSLUMINAL ANGIOPLASTY OF ARTERIOVENOUS FISTULA Left 06/16/2020    Procedure: PTA, AV Graft;  Surgeon: Milton Renee MD;  Location: [facility] CATH LAB;  Service: Peripheral Vascular;  Laterality: Left;    PERIPHERALLY INSERTED CENTRAL CATHETER INSERTION N/A 8/8/2023    Procedure: Insertion-picc;  Surgeon: Martin Alston MD;  Location: [facility] CATH LAB;  Service: Cardiology;  Laterality: N/A;    PERMACATH PLACEMENT Right     PLACEMENT OF ARTERIOVENOUS GRAFT Left 01/27/2020    Procedure: INSERTION, GRAFT, ARTERIOVENOUS;  Surgeon: Milton Renee MD;  [facility];  Service: General;  Laterality: Left;    PROSTATE SURGERY      SKIN BIOPSY      TRANSESOPHAGEAL ECHOCARDIOGRAM WITH  POSSIBLE CARDIOVERSION (WERNER W/ POSS CARDIOVERSION) N/A 04/11/2023    Procedure: TRANSESOPHAGEAL ECHOCARDIOGRAM WITH POSSIBLE CARDIOVERSION (WERNER W/ POSS CARDIOVERSION);  Surgeon: Martin Alston MD;  Location: [facility] CATH LAB;  Service: Cardiology;  Laterality: N/A;    ULTRASOUND GUIDANCE  06/16/2020    Procedure: Ultrasound Guidance;  Surgeon: Milton Renee MD;  Location: [facility] CATH LAB;  Service: Peripheral Vascular;;       Review of patient's allergies indicates:   Allergen Reactions    Benazepril Swelling    Diphenhydramine hcl      Other reaction(s): Unknown    Gabapentin Other (See Comments)     Double vision and headaches     Current Facility-Administered Medications   Medication Frequency    0.9%  NaCl infusion (for blood administration) Q24H PRN    0.9%  NaCl infusion Once    acetaminophen tablet 650 mg Q4H PRN    albuterol sulfate nebulizer solution 10 mg Once    atorvastatin tablet 40 mg QHS    calcium acetate(phosphat bind) capsule 667 mg TID WM    calcium gluconate 1 g in NS IVPB (premixed) Once    And    calcium gluconate 1 g in NS IVPB (premixed) Q10 Min PRN    carvediloL tablet 25 mg BID    dextromethorphan-guaiFENesin  mg per 12 hr tablet 1 tablet BID    dextrose 10% bolus 125 mL 125 mL PRN    dextrose 10% bolus 250 mL 250 mL PRN    dextrose 10% bolus 250 mL 250 mL PRN    dextrose 10% bolus 250 mL 250 mL PRN    dorzolamide 2 % ophthalmic solution 1 drop BID    And    timolol maleate 0.5% ophthalmic solution 1 drop BID    fluticasone propionate 50 mcg/actuation nasal spray 50 mcg Daily    furosemide tablet 40 mg Every Tues, Thurs, Sat    glucagon (human recombinant) injection 1 mg PRN    glucose chewable tablet 16 g PRN    glucose chewable tablet 24 g PRN    heparin (porcine) injection 1,000 Units PRN    insulin aspart U-100 pen 0-5 Units QID (AC + HS) PRN    melatonin tablet 6 mg Nightly PRN    naloxone 0.4 mg/mL injection 0.02 mg PRN    ondansetron injection 4 mg Q8H PRN    pantoprazole  injection 40 mg Q12H    penicillin G potassium 10 Million Units in dextrose 5 % (D5W) 500 mL CONTINUOUS INFUSION Q24H    polyethylene glycol packet 17 g BID PRN    prochlorperazine injection Soln 5 mg Q6H PRN    senna-docusate 8.6-50 mg per tablet 1 tablet Daily PRN    sodium chloride 0.9% bolus 250 mL 250 mL PRN    sodium chloride 0.9% flush 10 mL Q6H PRN    vitamin renal formula (B-complex-vitamin c-folic acid) 1 mg per capsule 1 capsule Daily     Facility-Administered Medications Ordered in Other Encounters   Medication Frequency    HYDROmorphone (PF) injection 0.5 mg Q10 Min PRN    ondansetron injection 4 mg Once PRN    oxyCODONE immediate release tablet 5 mg Q3H PRN     Family History       Problem Relation (Age of Onset)    Anuerysm Sister    Diabetes Mother    No Known Problems Father, Brother          Tobacco Use    Smoking status: Never    Smokeless tobacco: Never   Substance and Sexual Activity    Alcohol use: Not Currently    Drug use: Yes     Types: Hashish    Sexual activity: Not Currently     Partners: Female     Review of Systems   Constitutional: Negative.    HENT: Negative.     Respiratory:  Negative for shortness of breath.    Cardiovascular:  Negative for chest pain, palpitations and leg swelling.   Gastrointestinal: Negative.    Genitourinary:  Positive for decreased urine volume.   Skin: Negative.    Psychiatric/Behavioral: Negative.       Objective:     Vital Signs (Most Recent):  Temp: 98.3 °F (36.8 °C) (08/19/23 0750)  Pulse: 90 (08/19/23 1000)  Resp: 18 (08/19/23 0750)  BP: 135/61 (08/19/23 1000)  SpO2: 97 % (08/19/23 0750) Vital Signs (24h Range):  Temp:  [98.3 °F (36.8 °C)-99.3 °F (37.4 °C)] 98.3 °F (36.8 °C)  Pulse:  [] 90  Resp:  [15-27] 18  SpO2:  [97 %-100 %] 97 %  BP: (100-210)/(39-82) 135/61     Weight: 61.2 kg (134 lb 14.7 oz) (08/18/23 2107)  Body mass index is 21.13 kg/m².  Body surface area is 1.7 meters squared.    I/O last 3 completed shifts:  In: 378.3  [Blood:378.3]  Out: -      Physical Exam  Vitals and nursing note reviewed.   Constitutional:       Appearance: He is not toxic-appearing.      Interventions: Nasal cannula in place.      Comments: RUE PICC LINE   HENT:      Head: Normocephalic and atraumatic.      Mouth/Throat:      Mouth: Mucous membranes are moist.      Pharynx: Oropharynx is clear.   Eyes:      General: No scleral icterus.     Conjunctiva/sclera: Conjunctivae normal.   Cardiovascular:      Rate and Rhythm: Normal rate.      Pulses: Normal pulses.      Heart sounds: Murmur heard.      Arteriovenous access: Left arteriovenous access is present.     Comments: LUE AVF with thrill/bruit  Pulmonary:      Effort: Pulmonary effort is normal. No respiratory distress.      Breath sounds: Normal breath sounds. No wheezing or rales.   Abdominal:      General: Bowel sounds are normal. There is no distension.      Palpations: Abdomen is soft.      Tenderness: There is no abdominal tenderness. There is no guarding or rebound.   Musculoskeletal:      Cervical back: Neck supple. No tenderness.      Right lower leg: No edema.      Left lower leg: No edema.   Lymphadenopathy:      Cervical: No cervical adenopathy.   Skin:     General: Skin is warm.      Findings: No rash.   Neurological:      General: No focal deficit present.      Mental Status: He is alert and oriented to person, place, and time.   Psychiatric:         Mood and Affect: Mood normal.      Significant Labs:  CBC:   Recent Labs   Lab 08/19/23  0533   WBC 17.57*   RBC 2.89*   HGB 8.5*   HCT 25.5*      MCV 88   MCH 29.4   MCHC 33.3     CMP:   Recent Labs   Lab 08/18/23  0517 08/18/23  2145 08/19/23  0533 08/19/23  0830   * 140*  --   --    CALCIUM 9.7 9.0  --   --    ALBUMIN 2.6* 2.3*  --   --    PROT 6.8  --   --   --     139  --   --    K 4.8 5.8*   < > 6.1*   CO2 28 26  --   --    CL 92* 95  --   --    BUN 72* 85*  --   --    CREATININE 8.7* 9.5*  --   --    ALKPHOS 41*  --   --    --    ALT 9*  --   --   --    AST 15  --   --   --    BILITOT 0.5  --   --   --     < > = values in this interval not displayed.     All labs within the past 24 hours have been reviewed.

## 2023-08-19 NOTE — ASSESSMENT & PLAN NOTE
-patient in sinus rhythm-rate controlled., continue cardiac monitoring and coreg  -Currently Apixaban is contraindicated due to concern for possible GI bleeding.

## 2023-08-19 NOTE — H&P
[facility] - Int[facility] ([address] Medicine  History & Physical    Patient Name: Margaret Hernández Jr.  MRN: [id number]  Patient Class: IP- Inpatient  Admission Date: 8/18/2023  Attending Physician: Marsha Dixon MD [facility] A  Admitting Physician: Lyle Aburto MD  Primary Care Provider: Stephanie Hernandez NP    Patient information was obtained from patient, past medical records and ER records.     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  No diaphoresis, dyspnea, or nausea/vomiting noted.  EKG had ST elevation in AVR with depressions in inferior, anterior, and lateral leads.  Findings were felt to be consistent with STEMI.  He received sublingual nitroglycerin, aspirin, ticagrelor, and heparin bolus/infusion.  He was subsequently transferred to the [place] ED.     At [facility] - He was seen by Cardiology in the emergency department, and it was noted that his hemoglobin was 6.1.  It was felt that his findings were more likely a type 2 NSTEMI.  Heparin was discontinued, and 2 units packed red blood cells were given.  Troponin levels were 0.064 with repeat 0.094.  Stool for occult blood was positive, and he did report dark stool recently.  He is having intermittent chest pain but appears comfortable. In the emergency department he received sublingual nitroglycerin and IV morphine. With the symptomatic  "anemia, cardiac issues, and heme positive stool, ED spoke with Cardiology at [facility].  After reviewing the case was felt that urgent cardiac catheterization was not indicated.  Will plan transfer to Hospital Medicine at [facility] in step-down status for further treatment of symptomatic anemia/possible GI bleed.     Previous admissions this year at outside hospitals:  -March 1-2 with concern for temporal arteritis.  Treated with steroids with plans for temporal artery biopsy.  He subsequently had outpatient temporal artery biopsy on March 7 that had no significant increase in inflammation.  He was in the process of weaning off steroids.    -March 27-29 with atrial flutter treated medically.  -April 11 for elective cardioversion of atrial flutter.  He had no intracardiac thrombus on WERNER, and he was cardioverted to sinus rhythm.  He was treated with Eliquis.  -April 26-27 with chest pain/NSTEMI due showed FT depression in leads I, V4, V5, and V6 with ST elevation in AVR.  It was noted that these findings were seen on previous EKGs.  Hemoglobin was noted to be 8.3, and he received packed red blood cells with dialysis.  -April 8-10 with concern for neurosyphilis.  Infectious Diseases recommended lumbar puncture and PICC line placement for IV penicillin to treat suspected neurosyphilis.  He had lumbar puncture on August 8.  Recommendation was for 14 days of IV penicillin (end date around August 22).       BEDSIDE EVAL    Patient seen these evening, he is in bed in no distress, reports no acute complaints, reviewed events of today and plan for hospitalization.  He received 2 units PRBC prior to arrival - he is on 2L NC which is new.  At bedside removed Oxygen since patient on continuous bedside monitor initially sats no lower than 95 but patient visibly uncomfortable after oxygen removed, asked to sit up and then be reclined, stated he felt tighntess or "cold" in his throat.   His chest pain he initially " presented with is improved but not fully resolved, reports symptom as minor.     He reports seeing dark/black stools in recent days, denies any hematemesis, no abdominal pain, taking eliquis as outpatient.  Last dose of Eliquis was Wed 8/16    He denies prior hx of MI or any prior stent placements.   Repeat EKG is obtained and his ST depressions have resolved, sTAT labs obtained and Potassium is 5.8,  T-waves more hyperacute than last EKG performed today.  NO St elevations noted.  Patient lives with wife, performs all ADL at home, does not use assistive device, not working currenty.  Denies any Tobacco/ETOH/Drug use.       Past Medical History:   Diagnosis Date    Abdominal hernia     Anemia     Chronic kidney disease     ED (erectile dysfunction)     Glaucoma     Hard of hearing     Hyperlipidemia     Hypertension     Sinus congestion        Past Surgical History:   Procedure Laterality Date    AV FISTULA PLACEMENT Left 04/09/2018    BIOPSY OF TEMPORAL ARTERY Left 03/07/2023    Procedure: BIOPSY, ARTERY, TEMPORAL;  Surgeon: Yovany Sol MD;  Location: [facility];  Service: Cardiovascular;  Laterality: Left;  carotid artery    CATARACT EXTRACTION      FISTULOGRAM Left 01/14/2020    Procedure: Fistulogram;  Surgeon: Milton Renee MD;  Location: [facility] CATH LAB;  Service: Peripheral Vascular;  Laterality: Left;    FISTULOGRAM Left 03/10/2020    Procedure: Fistulogram;  Surgeon: Milton Renee MD;  Location: [facility] CATH LAB;  Service: Peripheral Vascular;  Laterality: Left;    FISTULOGRAM Left 06/16/2020    Procedure: Fistulogram;  Surgeon: Milton Renee MD;  Location: [facility] CATH LAB;  Service: Peripheral Vascular;  Laterality: Left;    FISTULOGRAM Left 02/08/2022    Procedure: Fistulogram;  Surgeon: Milton Renee MD;  Location: [facility] CATH LAB;  Service: Peripheral Vascular;  Laterality: Left;    HERNIA REPAIR      PERCUTANEOUS TRANSLUMINAL ANGIOPLASTY OF ARTERIOVENOUS FISTULA  Left 06/16/2020    Procedure: PTA, AV Graft;  Surgeon: Milton Renee MD;  Location: [facility] CATH LAB;  Service: Peripheral Vascular;  Laterality: Left;    PERIPHERALLY INSERTED CENTRAL CATHETER INSERTION N/A 8/8/2023    Procedure: Insertion-picc;  Surgeon: Martin Alston MD;  Location: [facility] CATH LAB;  Service: Cardiology;  Laterality: N/A;    PERMACATH PLACEMENT Right     PLACEMENT OF ARTERIOVENOUS GRAFT Left 01/27/2020    Procedure: INSERTION, GRAFT, ARTERIOVENOUS;  Surgeon: Milton Renee MD;  Location: [facility] OR;  Service: General;  Laterality: Left;    PROSTATE SURGERY      SKIN BIOPSY      TRANSESOPHAGEAL ECHOCARDIOGRAM WITH POSSIBLE CARDIOVERSION (WERNER W/ POSS CARDIOVERSION) N/A 04/11/2023    Procedure: TRANSESOPHAGEAL ECHOCARDIOGRAM WITH POSSIBLE CARDIOVERSION (WERNER W/ POSS CARDIOVERSION);  Surgeon: Martin Alston MD;  Location: [facility] CATH LAB;  Service: Cardiology;  Laterality: N/A;    ULTRASOUND GUIDANCE  06/16/2020    Procedure: Ultrasound Guidance;  Surgeon: Milton Renee MD;  Location: [facility] CATH LAB;  Service: Peripheral Vascular;;       Review of patient's allergies indicates:   Allergen Reactions    Benazepril Swelling    Diphenhydramine hcl      Other reaction(s): Unknown    Gabapentin Other (See Comments)     Double vision and headaches       Current Facility-Administered Medications on File Prior to Encounter   Medication    [COMPLETED] aspirin tablet 325 mg    [COMPLETED] heparin 25,000 units in dextrose 5% (100 units/ml) IV bolus from bag INITIAL BOLUS (max bolus 4000 units)    HYDROmorphone (PF) injection 0.5 mg    [COMPLETED] morphine injection 4 mg    [COMPLETED] nitroGLYCERIN SL tablet 0.4 mg    [COMPLETED] nitroGLYCERIN SL tablet 0.4 mg    [COMPLETED] nitroGLYCERIN SL tablet 0.4 mg    [COMPLETED] nitroGLYCERIN SL tablet 0.4 mg    ondansetron injection 4 mg    oxyCODONE immediate release tablet 5 mg    [COMPLETED] pantoprazole injection 80 mg     [COMPLETED] ticagrelor tablet 180 mg    [DISCONTINUED] 0.9%  NaCl infusion (for blood administration)    [DISCONTINUED] heparin (porcine) 5,000 unit/mL injection    [DISCONTINUED] heparin 25,000 units in dextrose 5% (100 units/ml) IV bolus from bag - ADDITIONAL PRN BOLUS - 30 units/kg (max bolus 4000 units)    [DISCONTINUED] heparin 25,000 units in dextrose 5% (100 units/ml) IV bolus from bag - ADDITIONAL PRN BOLUS - 30 units/kg (max bolus 4000 units)    [DISCONTINUED] heparin 25,000 units in dextrose 5% (100 units/ml) IV bolus from bag - ADDITIONAL PRN BOLUS - 60 units/kg (max bolus 4000 units)    [DISCONTINUED] heparin 25,000 units in dextrose 5% (100 units/ml) IV bolus from bag - ADDITIONAL PRN BOLUS - 60 units/kg (max bolus 4000 units)    [DISCONTINUED] heparin 25,000 units in dextrose 5% 250 mL (100 units/mL) infusion LOW INTENSITY nomogram - [facility]    [DISCONTINUED] heparin 25,000 units in dextrose 5% 250 mL (100 units/mL) infusion LOW INTENSITY nomogram - [facility]    [DISCONTINUED] nitroGLYCERIN (NITROSTAT) 0.4 MG SL tablet    [DISCONTINUED] NORepinephrine bitartrate-D5W 4 mg/250 mL (16 mcg/mL) infusion Soln     Current Outpatient Medications on File Prior to Encounter   Medication Sig    apixaban (ELIQUIS) 2.5 mg Tab Take 1 tablet (2.5 mg total) by mouth 2 (two) times daily.    b complex vitamins tablet Take 1 tablet by mouth once daily.    blood sugar diagnostic Strp To check BG 2 times daily, to use with insurance preferred meter True Metric    blood-glucose meter kit To check BG 2 times daily, to use with insurance preferred meter True Metric    calcium acetate,phosphat bind, (PHOSLO) 667 mg capsule TAKE ONE CAPSULE BY MOUTH THREE TIMES DAILY WITH MEALS    carvediloL (COREG) 12.5 MG tablet Take 2 tablets (25 mg total) by mouth 2 (two) times daily.    cetyl,stear.alcoh-prop gly-sls (CETAPHIL) Crea Apply topically.    dextrose 5 % (D5W) SolP 100 mL with penicillin G potassium 5 million unit SolR  8 Million Units Inject 8 Million Units into the vein continuous. for 12 days    dorzolamide-timolol 2-0.5% (COSOPT) 22.3-6.8 mg/mL ophthalmic solution Place 1 drop into both eyes 2 (two) times daily.    fluticasone propionate (FLONASE) 50 mcg/actuation nasal spray SHAKE LIQUID AND USE 1 SPRAY(50 MCG) IN EACH NOSTRIL EVERY DAY    furosemide (LASIX) 40 MG tablet Take 1 tablet (40 mg total) by mouth 2 (two) times daily. Take on non dialysis days    lancets Misc To check BG 2 times daily, to use with insurance preferred meter True Metric    lidocaine-prilocaine (EMLA) cream APPLY SMALL AMOUNT TO ACCESS SITE 1-2 HOURS BEFORE TREATMENT. COVER AREA WITH AN OCCLUSIVE DRESSING AS DIRECTED.    multivit-min/FA/lycopen/lutein (CENTRUM SILVER MEN ORAL) Take by mouth.     Family History       Problem Relation (Age of Onset)    Anuerysm Sister    Diabetes Mother    No Known Problems Father, Brother          Tobacco Use    Smoking status: Never    Smokeless tobacco: Never   Substance and Sexual Activity    Alcohol use: Not Currently    Drug use: Yes     Types: Hashish    Sexual activity: Not Currently     Partners: Female     Review of Systems   Constitutional:  Positive for activity change, appetite change and fatigue.   HENT:  Negative for sore throat and trouble swallowing.    Cardiovascular:  Negative for palpitations and leg swelling.   Gastrointestinal:  Negative for nausea and vomiting.   Genitourinary: Negative.    Musculoskeletal: Negative.    Skin: Negative.    Neurological:  Positive for weakness.   Psychiatric/Behavioral: Negative.       Objective:     Vital Signs (Most Recent):  Temp: 98.8 °F (37.1 °C) (08/18/23 1924)  Pulse: 102 (08/18/23 1947)  Resp: 20 (08/18/23 1947)  BP: (!) 146/58 (08/18/23 1947)  SpO2: 99 % (08/18/23 1947) Vital Signs (24h Range):  Temp:  [98 °F (36.7 °C)-98.8 °F (37.1 °C)] 98.8 °F (37.1 °C)  Pulse:  [] 102  Resp:  [14-32] 20  SpO2:  [95 %-100 %] 99 %  BP: (145-210)/(57-82)  "146/58        There is no height or weight on file to calculate BMI.     Physical Exam  Constitutional:       Appearance: He is ill-appearing. He is not toxic-appearing.      Interventions: Nasal cannula in place.      Comments: RUE PICC LINE   HENT:      Head: Normocephalic and atraumatic.      Mouth/Throat:      Mouth: Mucous membranes are moist.   Eyes:      General: No scleral icterus.  Cardiovascular:      Rate and Rhythm: Normal rate.      Pulses: Normal pulses.      Heart sounds: Murmur heard.      Comments: LUE AVF with thrill/bruit  Pulmonary:      Effort: Pulmonary effort is normal. No respiratory distress.      Breath sounds: Normal breath sounds. No wheezing or rales.   Abdominal:      General: Bowel sounds are normal. There is no distension.      Palpations: Abdomen is soft.      Tenderness: There is no abdominal tenderness. There is no guarding or rebound.   Musculoskeletal:      Cervical back: Neck supple. No tenderness.      Right lower leg: No edema.      Left lower leg: No edema.   Lymphadenopathy:      Cervical: No cervical adenopathy.   Skin:     General: Skin is warm.      Findings: No rash.   Neurological:      General: No focal deficit present.      Mental Status: He is alert and oriented to person, place, and time.                Significant Labs: All pertinent labs within the past 24 hours have been reviewed.  CBC:   Recent Labs   Lab 08/18/23  0517   WBC 11.42  11.42   HGB 6.1*  6.1*   HCT 20.4*  20.4*     247     CMP:   Recent Labs   Lab 08/18/23  0517      K 4.8   CL 92*   CO2 28   *   BUN 72*   CREATININE 8.7*   CALCIUM 9.7   PROT 6.8   ALBUMIN 2.6*   BILITOT 0.5   ALKPHOS 41*   AST 15   ALT 9*   ANIONGAP 18*     Cardiac Markers:   Recent Labs   Lab 08/18/23  0517   BNP 1,523*     Coagulation:   Recent Labs   Lab 08/18/23  0517   INR 1.0   APTT 22.9     Lactic Acid: No results for input(s): "LACTATE" in the last 48 hours.  Troponin:   Recent Labs   Lab " 08/18/23  0517 08/18/23  0649   TROPONINI 0.064* 0.094       Significant Imaging: I have reviewed all pertinent imaging results/findings within the past 24 hours.    Chest x-ray had no acute findings or significant radiographic change from previous studies.     EKG showed sinus rhythm with first-degree AV block.  Significant ST depression in the inferior and anterolateral leads.  ST-elevation in AVR.  -similar EKG changes noted April 26 April 11: WERNER with ejection fraction 30%, moderate global hypokinesis.  Enlarged right ventricle with moderately reduced right ventricular systolic function.  Severe left atrial enlargement.  Valvular abnormalities noted in the report.      Assessment/Plan:     * Type 2 MI (myocardial infarction)  Etiology of Type 2 MI thought to be secondary to Symptomatic anemia  --Trend Troponin here  -EKG prn for chest pain  -Continue outpatient Coreg BID,  Add nitrates either Nitropaste or oral ISDN pending trend of blood pressure  -Will hold Aspirin, Apixaban and further anti-platelet therapies pending completion of GI workup to r/o occult source of bleeding  -Obtain ECHO  -Consult Cardiology to assist in management pt with troponin >2.884    Hyperkalemia  -patient last had HD on Wednesday, 5am potassium was 4.8 and has received 2 units PRBC  Now potassium is 5.8,  EKG does have some potential hyperacute t-wave changes, also in the setting of an NSTEMI will place orders to control potassium  -Give 80mg IV lasix now  -Give 6units IV insulin regular and Dextrose per orderset  -Telemetry ordered  -      Symptomatic anemia  -FOBT positive at [facility] ED, s/p 2unit PRBC  -repeat CBC now and will trend  -pt with ESRD has anemia related to CKD also   -Monitor for symptom improvement s/p PRBC transfusion  -IV protonix BID - keep on clear liquids - NPO @ midnight - GI consultation in AM       ESRD on dialysis  Last HD reported in EMR as on 8/16,  Missed scheduled dialysis today  -continue  relevant home medications  -COnsult Nephrology for continued inpatient HD treatments      Acute respiratory failure with hypoxia  Patient with Hypoxic Respiratory failure which is Acute.  he is not on home oxygen. Supplemental oxygen was provided and noted-      .   Signs/symptoms of respiratory failure include- tachypnea and increased work of breathing. Contributing diagnoses includes - CHF and ESRD and Recent PRBC transfusion Labs and images were reviewed. Patient Has not had a recent ABG. Will treat underlying causes and adjust management of respiratory failure as follows-     Obtain repeat CXR here   Continue supplemental oxygen - pt symptomatic without it, sats down to 93% after 10min of O2 removal.       HFrEF (heart failure with reduced ejection fraction)  Chronic stable  -continue Diuretics Lasix on non-HD days - tue/thu/sat.   Pending response with GI bleed w/u and NSTEMI - change to a BID(Tue/Thu/Sat/Sun) frequency per home med rec dosing.    Primary hypertension  chornic-uncontrolled  -Continue Carvedilol  -will add short acting nitrates pending patient's trend - has received multiple doses 0.4mg SL NTG today (4 total)      Atrial flutter  -patient in sinus rhythm-rate controlled., continue cardiac monitoring and coreg  -Currently Apixaban is contraindicated due to concern for possible Occult GI bleeding.   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   -PICC line placed for continuous home infusion Pen G 8million units/daily End date is 08/23/23. Discussed with clinical pharmD tonight - will order per protocol Pen G 10million units continuous infusion      Type 2 diabetes mellitus with chronic kidney disease on chronic dialysis, without long-term current use of insulin  Patient's FSGs are controlled on current medication regimen.  Last A1c reviewed-   Lab Results   Component Value Date  "   HGBA1C 5.0 05/16/2023     Most recent fingerstick glucose reviewed- No results for input(s): "POCTGLUCOSE" in the last 24 hours.  Current correctional scale  Low  Maintain anti-hyperglycemic dose as follows-   Antihyperglycemics (From admission, onward)    Start     Stop Route Frequency Ordered    08/18/23 2211  insulin aspart U-100 pen 0-5 Units         -- SubQ Before meals & nightly PRN 08/18/23 2111        Hold Oral hypoglycemics while patient is in the hospital.    VTE Risk Mitigation (From admission, onward)         Ordered     Reason for No Pharmacological VTE Prophylaxis  Once        Question:  Reasons:  Answer:  Risk of Bleeding    08/18/23 2104     IP VTE HIGH RISK PATIENT  Once         08/18/23 2104     Place sequential compression device  Until discontinued         08/18/23 2104                           Lyle Aubrto MD  Department of Hospital Medicine  [facility] - Intensive Care ([place] [id number])  "

## 2023-08-19 NOTE — NURSING
Patient arrived to unit via EMS stretcher, called [id number] Med P no response. Attempted to SecureChat [facility] Med P however Med P is not available for secure chat. Dr Coronado was contacted via secure chat.

## 2023-08-19 NOTE — ASSESSMENT & PLAN NOTE
Patient's FSGs are controlled on current medication regimen.  Last A1c reviewed-   Lab Results   Component Value Date    HGBA1C 5.1 08/19/2023     Most recent fingerstick glucose reviewed-   Recent Labs   Lab 08/18/23  2358 08/19/23  0122 08/19/23  0411 08/19/23  0803   POCTGLUCOSE 223* 227* 180* 188*     Current correctional scale  Low  Maintain anti-hyperglycemic dose as follows-   Antihyperglycemics (From admission, onward)    Start     Stop Route Frequency Ordered    08/18/23 2211  insulin aspart U-100 pen 0-5 Units         -- SubQ Before meals & nightly PRN 08/18/23 2111        Hold Oral hypoglycemics while patient is in the hospital.

## 2023-08-19 NOTE — ASSESSMENT & PLAN NOTE
Etiology of Type 2 MI thought to be secondary to Symptomatic anemia  --Trend Troponin here 2.884->3.79  -EKG prn for chest pain  -Continue outpatient Coreg BID,  Consider adding nitrates either Nitropaste or oral ISDN pending trend of blood pressure  -Will hold Aspirin, Apixaban and further anti-platelet therapies pending completion of GI workup to r/o occult source of bleeding  -Obtain ECHO  -Consult Cardiology

## 2023-08-19 NOTE — ASSESSMENT & PLAN NOTE
Patient with Hypoxic Respiratory failure which is Acute.  he is not on home oxygen. Supplemental oxygen was provided and noted-      .   Signs/symptoms of respiratory failure include- tachypnea and increased work of breathing. Contributing diagnoses includes - CHF and ESRD and Recent PRBC transfusion Labs and images were reviewed. Patient Has not had a recent ABG. Will treat underlying causes and adjust management of respiratory failure as follows-     · Obtained repeat CXR on arrival: central vascular congestion.  No significant change in cardiopulmonary status, noting continued mild increased perihilar interstitial attenuation, similar to prior examination.  No new large confluent airspace consolidation or pneumothorax identified  · Continue supplemental oxygen - pt symptomatic without it, sats down to 93% after 10min of O2 removal.   · Wean supplemental oxygen as tolerated  · HD today

## 2023-08-19 NOTE — PROGRESS NOTES
Type 2 MI   Got two units transfused in OSH. Still Hb<8. Getting another unit transfused. Hold off on anti platelet or anticoagulant therapy. Trend trop until peak. Obtain echo. Continue beta blocker therapy as on right now. To decide about further therapy after echo. Watch volume status closely with transfusions as EF 30% on prior echo.   Full note to follow

## 2023-08-19 NOTE — PROGRESS NOTES
Potassium this morning 6.1. Orders placed to shift patient, but transporter in route to take patient to dialysis. Primary MD at bedside and aware that orders will held. Will reassess after dialysis. Taken to dialysis at 0945. IVPB calcium gluconate give before transport to HD. Episode of melena this morning, GI provider visualized stool and primary MD notified of finding.

## 2023-08-19 NOTE — NURSING
Blood transfusion infusion initiate, no transfusion reaction noted. Ongoing monitoring patient    resp care      06/10/20 1518   Respiratory Assessment   Resp Comments Pt  continues on cpap/ps settings  Pt  resting comfortably   Surgical Airway Shiley Cuffed   No Placement Date or Time found  Tube Size: 6 mm  Previously placed by?: Present on admission  Type: Tracheostomy  Brand: Ta  Style: Cuffed   Status Secured   Site Assessment Clean;Dry   Equipment at bedside BVM; Wall Suction setup; Additional inner cannula   Additional Assessments   SpO2 99 %

## 2023-08-19 NOTE — ASSESSMENT & PLAN NOTE
Admitted with symptomatic anemia +FOBT and type 2 MI. Received 3 units of blood. Transferred to [facility] for cards eval. No plans for intervention at this time. Troponin 2.88. BNP 1200. Denies chest pain or SOB at this time. Oxygenating well on RA. K 6.1 this am. GI and cardiology following. EF 30% (03/2023).     Nephrology History  iHD Schedule: MWF   Unit/MD: [facility]   Duration: 3 hours   UF: 1-2  EDW: 59.5  Access: PAM AVF   Residual Renal Function: minimal     Assessment:   - Will provide dialysis for metabolic clearance and volume management today 8/19  - Low /600, 3K bath. UF 1L as tolerated    - Echo pending, EF 30%   - Seen on HD. No complaints.   - Continue to monitor intake and output, daily weights   - Avoid nephrotoxic medication and renal dose medications to GFR

## 2023-08-19 NOTE — HPI
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  No diaphoresis, dyspnea, or nausea/vomiting noted.  EKG had ST elevation in AVR with depressions in inferior, anterior, and lateral leads.  Findings were felt to be consistent with STEMI.  He received sublingual nitroglycerin, aspirin, ticagrelor, and heparin bolus/infusion. At [facility] ED - He was seen by Cardiology in the emergency department, and it was noted that his hemoglobin was 6.1.  It was felt that his findings were more likely a type 2 NSTEMI.  Heparin was discontinued, and 2 units packed red blood cells were given.  Troponin levels were 0.064 with repeat 0.094.  Stool for occult blood was positive, and he did report dark stool recently.  He is having intermittent chest pain but appears comfortable. In the emergency department he received sublingual nitroglycerin and IV morphine. With the symptomatic anemia, cardiac issues, and heme positive stool, ED spoke with Cardiology at [facility].  After reviewing the case was felt that urgent cardiac catheterization was not indicated at this time. Tropnon 2.8 this am. K 6.1. BNP 1523. Received another unit of blood at [facility]. Patient denies any chest pain or SOB at time of eval. Nephrology consulted for ESRD on HD.     HPI obtained via EMR and patient interview.

## 2023-08-19 NOTE — ASSESSMENT & PLAN NOTE
"Patient's FSGs are controlled on current medication regimen.  Last A1c reviewed-   Lab Results   Component Value Date    HGBA1C 5.0 05/16/2023     Most recent fingerstick glucose reviewed- No results for input(s): "POCTGLUCOSE" in the last 24 hours.  Current correctional scale  Low  Maintain anti-hyperglycemic dose as follows-   Antihyperglycemics (From admission, onward)    Start     Stop Route Frequency Ordered    08/18/23 2211  insulin aspart U-100 pen 0-5 Units         -- SubQ Before meals & nightly PRN 08/18/23 2111        Hold Oral hypoglycemics while patient is in the hospital.  "

## 2023-08-19 NOTE — ASSESSMENT & PLAN NOTE
Etiology of Type 2 MI thought to be secondary to Symptomatic anemia  --Trend Troponin here  -EKG prn for chest pain  -Continue outpatient Coreg BID,  Add nitrates either Nitropaste or oral ISDN pending trend of blood pressure  -Will hold Aspirin, Apixaban and further anti-platelet therapies pending completion of GI workup to r/o occult source of bleeding  -Obtain ECHO  -Consult Cardiology to assist in management pt with troponin >2.884

## 2023-08-19 NOTE — ASSESSMENT & PLAN NOTE
Patient with Hypoxic Respiratory failure which is Acute.  he is not on home oxygen. Supplemental oxygen was provided and noted-      .   Signs/symptoms of respiratory failure include- tachypnea and increased work of breathing. Contributing diagnoses includes - CHF and ESRD and Recent PRBC transfusion Labs and images were reviewed. Patient Has not had a recent ABG. Will treat underlying causes and adjust management of respiratory failure as follows-     Obtain repeat CXR here   Continue supplemental oxygen - pt symptomatic without it, sats down to 93% after 10min of O2 removal.

## 2023-08-19 NOTE — TREATMENT PLAN
Please make sure patient starts Golytely prep at 6PM on Sunday, 8/20. Prep should be completed within 2 hours for best results, but must be completed within 4 hours of starting the prep.    Trell Bui DO, MS  Gastroenterology PGY-4

## 2023-08-19 NOTE — ASSESSMENT & PLAN NOTE
-FOBT positive at [facility] ED, s/p 2unit PRBC  -Hgb 8.5  -CBC TID as ongoing melena this AM  -CP with improvement but ongoing generalized weakness  -pt with ESRD has anemia related to CKD also   -NPO pending GI consultation

## 2023-08-19 NOTE — ASSESSMENT & PLAN NOTE
Chronic stable  -continue Diuretics Lasix on non-HD days - tue/thu/sat.   Pending response with GI bleed w/u and NSTEMI - change to a BID(Tue/Thu/Sat/Sun) frequency per home med rec dosing.

## 2023-08-19 NOTE — CONSULTS
[facility] - [facility] ([place] [address])  Nephrology  Consult Note    Patient Name: Margaret Hernández Jr.  MRN: [id number]  Admission Date: 8/18/2023  Hospital Length of Stay: 1 days  Attending Provider: Marsha Dixon MD   Primary Care Physician: Stephanie Hernandez NP  Principal Problem:Type 2 MI (myocardial infarction)    Inpatient consult to Nephrology  Consult performed by: Ivett Mack DNP  Consult ordered by: Lyle Aburto MD  Reason for consult: ESRD on HD         Subjective:     HPI: 83-year-old male with a history of anemia, ESRD on hemodialysis MWF (most recent dialysis on August 16), HFrEF, atrial flutter (on Eliquis) with DCCV, syphilis (treated in 2016), hyperlipidemia, hypertension, glaucoma, and recent admission to [facility] for 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  No diaphoresis, dyspnea, or nausea/vomiting noted.  EKG had ST elevation in AVR with depressions in inferior, anterior, and lateral leads.  Findings were felt to be consistent with STEMI.  He received sublingual nitroglycerin, aspirin, ticagrelor, and heparin bolus/infusion. At [facility] ED - He was seen by Cardiology in the emergency department, and it was noted that his hemoglobin was 6.1.  It was felt that his findings were more likely a type 2 NSTEMI.  Heparin was discontinued, and 2 units packed red blood cells were given.  Troponin levels were 0.064 with repeat 0.094.  Stool for occult blood was positive, and he did report dark stool recently.  He is having intermittent chest pain but appears comfortable. In the emergency department he received sublingual nitroglycerin and IV morphine. With the symptomatic anemia, cardiac issues, and heme positive stool, ED spoke with Cardiology at [facility].  After reviewing the case was felt that urgent cardiac catheterization was not indicated  at this time. Tropnon 2.8 this am. K 6.1. BNP 1523. Received another unit of blood at [facility]. Patient denies any chest pain or SOB at time of eval. Nephrology consulted for ESRD on HD.     HPI obtained via EMR and patient interview.       Past Medical History:   Diagnosis Date    Abdominal hernia     Anemia     Chronic kidney disease     ED (erectile dysfunction)     Glaucoma     Hard of hearing     Hyperlipidemia     Hypertension     Sinus congestion        Past Surgical History:   Procedure Laterality Date    AV FISTULA PLACEMENT Left 04/09/2018    BIOPSY OF TEMPORAL ARTERY Left 03/07/2023    Procedure: BIOPSY, ARTERY, TEMPORAL;  Surgeon: Yovany Sol MD;  Location: [facility];  Service: Cardiovascular;  Laterality: Left;  carotid artery    CATARACT EXTRACTION      FISTULOGRAM Left 01/14/2020    Procedure: Fistulogram;  Surgeon: Milton Renee MD;  Location: [facility] CATH LAB;  Service: Peripheral Vascular;  Laterality: Left;    FISTULOGRAM Left 03/10/2020    Procedure: Fistulogram;  Surgeon: Milton Rneee MD;  Location: [facility] CATH LAB;  Service: Peripheral Vascular;  Laterality: Left;    FISTULOGRAM Left 06/16/2020    Procedure: Fistulogram;  Surgeon: Milton Renee MD;  Location: [facility] CATH LAB;  Service: Peripheral Vascular;  Laterality: Left;    FISTULOGRAM Left 02/08/2022    Procedure: Fistulogram;  Surgeon: Milton Renee MD;  Location: [facility] CATH LAB;  Service: Peripheral Vascular;  Laterality: Left;    HERNIA REPAIR      PERCUTANEOUS TRANSLUMINAL ANGIOPLASTY OF ARTERIOVENOUS FISTULA Left 06/16/2020    Procedure: PTA, AV Graft;  Surgeon: Milton Renee MD;  Location: [facility] CATH LAB;  Service: Peripheral Vascular;  Laterality: Left;    PERIPHERALLY INSERTED CENTRAL CATHETER INSERTION N/A 8/8/2023    Procedure: Insertion-picc;  Surgeon: Martin Alston MD;  Location: [facility] CATH LAB;  Service: Cardiology;  Laterality: N/A;    PERMACATH PLACEMENT Right     PLACEMENT OF  ARTERIOVENOUS GRAFT Left 01/27/2020    Procedure: INSERTION, GRAFT, ARTERIOVENOUS;  Surgeon: Milton Renee MD;  Location: [facility] OR;  Service: General;  Laterality: Left;    PROSTATE SURGERY      SKIN BIOPSY      TRANSESOPHAGEAL ECHOCARDIOGRAM WITH POSSIBLE CARDIOVERSION (WERNER W/ POSS CARDIOVERSION) N/A 04/11/2023    Procedure: TRANSESOPHAGEAL ECHOCARDIOGRAM WITH POSSIBLE CARDIOVERSION (WERNER W/ POSS CARDIOVERSION);  Surgeon: Martin Alston MD;  Location: [facility] CATH LAB;  Service: Cardiology;  Laterality: N/A;    ULTRASOUND GUIDANCE  06/16/2020    Procedure: Ultrasound Guidance;  Surgeon: Milton Renee MD;  Location: [facility] CATH LAB;  Service: Peripheral Vascular;;       Review of patient's allergies indicates:   Allergen Reactions    Benazepril Swelling    Diphenhydramine hcl      Other reaction(s): Unknown    Gabapentin Other (See Comments)     Double vision and headaches     Current Facility-Administered Medications   Medication Frequency    0.9%  NaCl infusion (for blood administration) Q24H PRN    0.9%  NaCl infusion Once    acetaminophen tablet 650 mg Q4H PRN    albuterol sulfate nebulizer solution 10 mg Once    atorvastatin tablet 40 mg QHS    calcium acetate(phosphat bind) capsule 667 mg TID WM    calcium gluconate 1 g in NS IVPB (premixed) Once    And    calcium gluconate 1 g in NS IVPB (premixed) Q10 Min PRN    carvediloL tablet 25 mg BID    dextromethorphan-guaiFENesin  mg per 12 hr tablet 1 tablet BID    dextrose 10% bolus 125 mL 125 mL PRN    dextrose 10% bolus 250 mL 250 mL PRN    dextrose 10% bolus 250 mL 250 mL PRN    dextrose 10% bolus 250 mL 250 mL PRN    dorzolamide 2 % ophthalmic solution 1 drop BID    And    timolol maleate 0.5% ophthalmic solution 1 drop BID    fluticasone propionate 50 mcg/actuation nasal spray 50 mcg Daily    furosemide tablet 40 mg Every Tues, Thurs, Sat    glucagon (human recombinant) injection 1 mg PRN    glucose chewable tablet 16  g PRN    glucose chewable tablet 24 g PRN    heparin (porcine) injection 1,000 Units PRN    insulin aspart U-100 pen 0-5 Units QID (AC + HS) PRN    melatonin tablet 6 mg Nightly PRN    naloxone 0.4 mg/mL injection 0.02 mg PRN    ondansetron injection 4 mg Q8H PRN    pantoprazole injection 40 mg Q12H    penicillin G potassium 10 Million Units in dextrose 5 % (D5W) 500 mL CONTINUOUS INFUSION Q24H    polyethylene glycol packet 17 g BID PRN    prochlorperazine injection Soln 5 mg Q6H PRN    senna-docusate 8.6-50 mg per tablet 1 tablet Daily PRN    sodium chloride 0.9% bolus 250 mL 250 mL PRN    sodium chloride 0.9% flush 10 mL Q6H PRN    vitamin renal formula (B-complex-vitamin c-folic acid) 1 mg per capsule 1 capsule Daily     Facility-Administered Medications Ordered in Other Encounters   Medication Frequency    HYDROmorphone (PF) injection 0.5 mg Q10 Min PRN    ondansetron injection 4 mg Once PRN    oxyCODONE immediate release tablet 5 mg Q3H PRN     Family History       Problem Relation (Age of Onset)    Anuerysm Sister    Diabetes Mother    No Known Problems Father, Brother          Tobacco Use    Smoking status: Never    Smokeless tobacco: Never   Substance and Sexual Activity    Alcohol use: Not Currently    Drug use: Yes     Types: Hashish    Sexual activity: Not Currently     Partners: Female     Review of Systems   Constitutional: Negative.    HENT: Negative.     Respiratory:  Negative for shortness of breath.    Cardiovascular:  Negative for chest pain, palpitations and leg swelling.   Gastrointestinal: Negative.    Genitourinary:  Positive for decreased urine volume.   Skin: Negative.    Psychiatric/Behavioral: Negative.       Objective:     Vital Signs (Most Recent):  Temp: 98.3 °F (36.8 °C) (08/19/23 0750)  Pulse: 90 (08/19/23 1000)  Resp: 18 (08/19/23 0750)  BP: 135/61 (08/19/23 1000)  SpO2: 97 % (08/19/23 0750) Vital Signs (24h Range):  Temp:  [98.3 °F (36.8 °C)-99.3 °F (37.4 °C)]  98.3 °F (36.8 °C)  Pulse:  [] 90  Resp:  [15-27] 18  SpO2:  [97 %-100 %] 97 %  BP: (100-210)/(39-82) 135/61     Weight: 61.2 kg (134 lb 14.7 oz) (08/18/23 2107)  Body mass index is 21.13 kg/m².  Body surface area is 1.7 meters squared.    I/O last 3 completed shifts:  In: 378.3 [Blood:378.3]  Out: -      Physical Exam  Vitals and nursing note reviewed.   Constitutional:       Appearance: He is not toxic-appearing.      Interventions: Nasal cannula in place.      Comments: RUE PICC LINE   HENT:      Head: Normocephalic and atraumatic.      Mouth/Throat:      Mouth: Mucous membranes are moist.      Pharynx: Oropharynx is clear.   Eyes:      General: No scleral icterus.     Conjunctiva/sclera: Conjunctivae normal.   Cardiovascular:      Rate and Rhythm: Normal rate.      Pulses: Normal pulses.      Heart sounds: Murmur heard.      Arteriovenous access: Left arteriovenous access is present.     Comments: LUE AVF with thrill/bruit  Pulmonary:      Effort: Pulmonary effort is normal. No respiratory distress.      Breath sounds: Normal breath sounds. No wheezing or rales.   Abdominal:      General: Bowel sounds are normal. There is no distension.      Palpations: Abdomen is soft.      Tenderness: There is no abdominal tenderness. There is no guarding or rebound.   Musculoskeletal:      Cervical back: Neck supple. No tenderness.      Right lower leg: No edema.      Left lower leg: No edema.   Lymphadenopathy:      Cervical: No cervical adenopathy.   Skin:     General: Skin is warm.      Findings: No rash.   Neurological:      General: No focal deficit present.      Mental Status: He is alert and oriented to person, place, and time.   Psychiatric:         Mood and Affect: Mood normal.      Significant Labs:  CBC:   Recent Labs   Lab 08/19/23  0533   WBC 17.57*   RBC 2.89*   HGB 8.5*   HCT 25.5*      MCV 88   MCH 29.4   MCHC 33.3     CMP:   Recent Labs   Lab 08/18/23  0517 08/18/23  2145 08/19/23  0533  08/19/23  0830   * 140*  --   --    CALCIUM 9.7 9.0  --   --    ALBUMIN 2.6* 2.3*  --   --    PROT 6.8  --   --   --     139  --   --    K 4.8 5.8*   < > 6.1*   CO2 28 26  --   --    CL 92* 95  --   --    BUN 72* 85*  --   --    CREATININE 8.7* 9.5*  --   --    ALKPHOS 41*  --   --   --    ALT 9*  --   --   --    AST 15  --   --   --    BILITOT 0.5  --   --   --     < > = values in this interval not displayed.     All labs within the past 24 hours have been reviewed.          Assessment/Plan:     Cardiac/Vascular  * Type 2 MI (myocardial infarction)  -management per primary     Renal/  Hyperkalemia  HD today     ESRD on dialysis  Admitted with symptomatic anemia +FOBT and type 2 MI. Received 3 units of blood. Transferred to [facility] for cards eval. No plans for intervention at this time. Troponin 2.88. BNP 1200. Denies chest pain or SOB at this time. Oxygenating well on RA. K 6.1 this am. GI and cardiology following. EF 30% (03/2023).     Nephrology History  iHD Schedule: MWF   Unit/MD: [facility] [place]   Duration: 3 hours   UF: 1-2  EDW: 59.5  Access: PAM AVF   Residual Renal Function: minimal     Assessment:   - Will provide dialysis for metabolic clearance and volume management today 8/19  - Low /600, 3K bath. UF 1L as tolerated    - Echo pending, EF 30%   - Seen on HD. No complaints.   - Continue to monitor intake and output, daily weights   - Avoid nephrotoxic medication and renal dose medications to GFR  -Consent obtained and placed in chart           ID  Neurosyphilis in adult  -        Thank you for your consult. I will follow-up with patient. Please contact us if you have any additional questions.    Ivett Mack, JAME  Nephrology  [facility] - Int[facility] ([place]

## 2023-08-19 NOTE — PROGRESS NOTES
HD TX completed.   Net fluid removed is 1 liter.  VSS. Tolerated dialysis.  Needles removed from exit sites.  Pressure applied, until hemostasis achieved.  Report given to primary nurse.  Returned to floor via stretcher.

## 2023-08-19 NOTE — NURSING
Called [facility] second time able to reach MD. MD was made aware that the patient arrived to the unit.

## 2023-08-19 NOTE — SUBJECTIVE & OBJECTIVE
Past Medical History:   Diagnosis Date    Abdominal hernia     Anemia     Chronic kidney disease     ED (erectile dysfunction)     Glaucoma     Hard of hearing     Hyperlipidemia     Hypertension     Sinus congestion        Past Surgical History:   Procedure Laterality Date    AV FISTULA PLACEMENT Left 04/09/2018    BIOPSY OF TEMPORAL ARTERY Left 03/07/2023    Procedure: BIOPSY, ARTERY, TEMPORAL;  Surgeon: Yovany Sol MD;  Location: [facility];  Service: Cardiovascular;  Laterality: Left;  carotid artery    CATARACT EXTRACTION      FISTULOGRAM Left 01/14/2020    Procedure: Fistulogram;  Surgeon: Milton Renee MD;  Location: [facility] CATH LAB;  Service: Peripheral Vascular;  Laterality: Left;    FISTULOGRAM Left 03/10/2020    Procedure: Fistulogram;  Surgeon: Milton Renee MD;  Location: [facility] CATH LAB;  Service: Peripheral Vascular;  Laterality: Left;    FISTULOGRAM Left 06/16/2020    Procedure: Fistulogram;  Surgeon: Milton Renee MD;  Location: [facility] CATH LAB;  Service: Peripheral Vascular;  Laterality: Left;    FISTULOGRAM Left 02/08/2022    Procedure: Fistulogram;  Surgeon: Milton Renee MD;  Location: [facility] CATH LAB;  Service: Peripheral Vascular;  Laterality: Left;    HERNIA REPAIR      PERCUTANEOUS TRANSLUMINAL ANGIOPLASTY OF ARTERIOVENOUS FISTULA Left 06/16/2020    Procedure: PTA, AV Graft;  Surgeon: Milton Renee MD;  Location: [facility] CATH LAB;  Service: Peripheral Vascular;  Laterality: Left;    PERIPHERALLY INSERTED CENTRAL CATHETER INSERTION N/A 8/8/2023    Procedure: Insertion-picc;  Surgeon: Martin Alston MD;  Location: [facility] CATH LAB;  Service: Cardiology;  Laterality: N/A;    PERMACATH PLACEMENT Right     PLACEMENT OF ARTERIOVENOUS GRAFT Left 01/27/2020    Procedure: INSERTION, GRAFT, ARTERIOVENOUS;  Surgeon: Milton Renee MD;  Location: [facility];  Service: General;  Laterality: Left;    PROSTATE SURGERY      SKIN BIOPSY      TRANSESOPHAGEAL ECHOCARDIOGRAM WITH  POSSIBLE CARDIOVERSION (WERNER W/ POSS CARDIOVERSION) N/A 04/11/2023    Procedure: TRANSESOPHAGEAL ECHOCARDIOGRAM WITH POSSIBLE CARDIOVERSION (WERNER W/ POSS CARDIOVERSION);  Surgeon: Martin Alston MD;  Location: [facility] CATH LAB;  Service: Cardiology;  Laterality: N/A;    ULTRASOUND GUIDANCE  06/16/2020    Procedure: Ultrasound Guidance;  Surgeon: Milton Renee MD;  Location: [facility] CATH LAB;  Service: Peripheral Vascular;;       Review of patient's allergies indicates:   Allergen Reactions    Benazepril Swelling    Diphenhydramine hcl      Other reaction(s): Unknown    Gabapentin Other (See Comments)     Double vision and headaches       Current Facility-Administered Medications on File Prior to Encounter   Medication    HYDROmorphone (PF) injection 0.5 mg    [COMPLETED] nitroGLYCERIN SL tablet 0.4 mg    ondansetron injection 4 mg    oxyCODONE immediate release tablet 5 mg    [COMPLETED] pantoprazole injection 80 mg    [DISCONTINUED] 0.9%  NaCl infusion (for blood administration)     Current Outpatient Medications on File Prior to Encounter   Medication Sig    apixaban (ELIQUIS) 2.5 mg Tab Take 1 tablet (2.5 mg total) by mouth 2 (two) times daily.    b complex vitamins tablet Take 1 tablet by mouth once daily.    blood sugar diagnostic Strp To check BG 2 times daily, to use with insurance preferred meter True Metric    blood-glucose meter kit To check BG 2 times daily, to use with insurance preferred meter True Metric    calcium acetate,phosphat bind, (PHOSLO) 667 mg capsule TAKE ONE CAPSULE BY MOUTH THREE TIMES DAILY WITH MEALS    carvediloL (COREG) 12.5 MG tablet Take 2 tablets (25 mg total) by mouth 2 (two) times daily.    cetyl,stear.alcoh-prop gly-sls (CETAPHIL) Crea Apply topically.    dextrose 5 % (D5W) SolP 100 mL with penicillin G potassium 5 million unit SolR 8 Million Units Inject 8 Million Units into the vein continuous. for 12 days    dorzolamide-timolol 2-0.5% (COSOPT) 22.3-6.8 mg/mL ophthalmic  solution Place 1 drop into both eyes 2 (two) times daily.    fluticasone propionate (FLONASE) 50 mcg/actuation nasal spray SHAKE LIQUID AND USE 1 SPRAY(50 MCG) IN EACH NOSTRIL EVERY DAY    furosemide (LASIX) 40 MG tablet Take 1 tablet (40 mg total) by mouth 2 (two) times daily. Take on non dialysis days    lancets Misc To check BG 2 times daily, to use with insurance preferred meter True Metric    lidocaine-prilocaine (EMLA) cream APPLY SMALL AMOUNT TO ACCESS SITE 1-2 HOURS BEFORE TREATMENT. COVER AREA WITH AN OCCLUSIVE DRESSING AS DIRECTED.    multivit-min/FA/lycopen/lutein (CENTRUM SILVER MEN ORAL) Take by mouth.     Family History       Problem Relation (Age of Onset)    Anuerysm Sister    Diabetes Mother    No Known Problems Father, Brother          Tobacco Use    Smoking status: Never    Smokeless tobacco: Never   Substance and Sexual Activity    Alcohol use: Not Currently    Drug use: Yes     Types: Hashish    Sexual activity: Not Currently     Partners: Female     Review of Systems   Constitutional: Positive for malaise/fatigue. Negative for chills, decreased appetite, diaphoresis, fever and weight gain.   HENT:  Negative for congestion and ear discharge.    Eyes:  Negative for blurred vision.   Cardiovascular:  Negative for chest pain, dyspnea on exertion, irregular heartbeat, leg swelling, orthopnea, palpitations and paroxysmal nocturnal dyspnea.   Respiratory:  Negative for cough, shortness of breath, snoring and sputum production.    Skin:  Negative for color change, dry skin and rash.   Musculoskeletal:  Negative for back pain, falls, joint pain and neck pain.   Gastrointestinal:  Negative for bloating, abdominal pain, constipation and diarrhea.   Genitourinary:  Negative for dysuria, flank pain, hematuria and hesitancy.   Neurological:  Negative for focal weakness, headaches, numbness and seizures.   Psychiatric/Behavioral:  Negative for altered mental status, depression and hallucinations.       Objective:     Vital Signs (Most Recent):  Temp: 98.3 °F (36.8 °C) (08/19/23 0750)  Pulse: 89 (08/19/23 0750)  Resp: 18 (08/19/23 0750)  BP: 139/60 (08/19/23 0750)  SpO2: 97 % (08/19/23 0750) Vital Signs (24h Range):  Temp:  [98.2 °F (36.8 °C)-99.3 °F (37.4 °C)] 98.3 °F (36.8 °C)  Pulse:  [] 89  Resp:  [15-27] 18  SpO2:  [97 %-100 %] 97 %  BP: (100-210)/(39-82) 139/60     Weight: 61.2 kg (134 lb 14.7 oz)  Body mass index is 21.13 kg/m².    SpO2: 97 %         Intake/Output Summary (Last 24 hours) at 8/19/2023 0849  Last data filed at 8/19/2023 0500  Gross per 24 hour   Intake 378.25 ml   Output --   Net 378.25 ml       Lines/Drains/Airways       Peripherally Inserted Central Catheter Line  Duration             PICC Single Lumen 08/08/23 1421 right basilic 10 days              Peripheral Intravenous Line  Duration                  Hemodialysis AV Fistula 03/27/23 1911 Left upper arm 144 days         Peripheral IV - Single Lumen 08/18/23 0905 18 G Anterior;Right Forearm <1 day                     Physical Exam  Constitutional:       General: He is not in acute distress.     Appearance: He is not ill-appearing.   HENT:      Nose: Nose normal.      Mouth/Throat:      Mouth: Mucous membranes are moist.   Eyes:      Pupils: Pupils are equal, round, and reactive to light.   Neck:      Comments: No JVD appreciated   Cardiovascular:      Rate and Rhythm: Normal rate and regular rhythm.      Pulses: Normal pulses.      Heart sounds: Murmur (Grade 2/6 systolic crescendo-decrescendo murmur) heard.   Pulmonary:      Effort: Pulmonary effort is normal. No respiratory distress.      Breath sounds: Rales present. No wheezing.   Abdominal:      General: Abdomen is flat. There is no distension.      Palpations: Abdomen is soft.      Tenderness: There is no right CVA tenderness or left CVA tenderness.   Musculoskeletal:         General: No swelling.      Cervical back: Normal range of motion and neck supple. No tenderness.     "  Right lower leg: No edema.      Left lower leg: No edema.      Comments: LUE AV fistula with good thrill   Skin:     General: Skin is warm.      Coloration: Skin is not pale.      Findings: No rash.   Neurological:      General: No focal deficit present.      Mental Status: He is alert and oriented to person, place, and time.      Motor: No weakness.          Significant Labs: ABG: No results for input(s): "PH", "PCO2", "HCO3", "POCSATURATED", "BE" in the last 48 hours., Blood Culture: No results for input(s): "LABBLOO" in the last 48 hours., BMP:   Recent Labs   Lab 08/18/23 0517 08/18/23 2145 08/19/23 0533   * 140*  --     139  --    K 4.8 5.8* 6.1*   CL 92* 95  --    CO2 28 26  --    BUN 72* 85*  --    CREATININE 8.7* 9.5*  --    CALCIUM 9.7 9.0  --    MG  --  2.2  --    , CMP   Recent Labs   Lab 08/18/23 0517 08/18/23 2145 08/19/23  0533    139  --    K 4.8 5.8* 6.1*   CL 92* 95  --    CO2 28 26  --    * 140*  --    BUN 72* 85*  --    CREATININE 8.7* 9.5*  --    CALCIUM 9.7 9.0  --    PROT 6.8  --   --    ALBUMIN 2.6* 2.3*  --    BILITOT 0.5  --   --    ALKPHOS 41*  --   --    AST 15  --   --    ALT 9*  --   --    ANIONGAP 18* 18*  --    , CBC   Recent Labs   Lab 08/18/23 0517 08/18/23 2145 08/19/23 0533   WBC 11.42  11.42 20.83* 17.57*   HGB 6.1*  6.1* 7.6* 8.5*   HCT 20.4*  20.4* 23.8* 25.5*     247 212 181   , INR   Recent Labs   Lab 08/18/23 0517 08/18/23 2145   INR 1.0 1.0   , Lipid Panel   Recent Labs   Lab 08/19/23  0533   CHOL 106*   HDL 27*   LDLCALC 58.2*   TRIG 104   CHOLHDL 25.5   ,   Pathology Results  (Last 10 years)                 03/07/23 1429  Specimen to Pathology, Surgery Other (vascular) Final result    Narrative:  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   Which provider would you like to cc?->PRATIMA GUILLERMO   Release to patient->Immediate   Specimen total (fresh, frozen, permanent):->1           , Troponin   Recent Labs   Lab 08/18/23  0649 08/18/23  2145 08/19/23  0533   TROPONINI 0.094 2.884* 3.793*   , and All pertinent lab results from the last 24 hours have been reviewed.    EKG August 18th 6:39 a.m.  Sinus tachycardia with  Significant ST depression in the inferior and anterolateral leads.  ST-elevation in AVR.    EKG August 18th 9:09 PM  Sinus rhythm with resolution of ST changes.  LVH noted.     TTE 03/2023 independently reviewed, showed LVEF 30-35%, Moderate RV enlargement with moderately reduced RV systolic function. There is mild aortic valve stenosis. Moderate aortic regurgitation. Moderate-to-severe mitral regurgitation. Severe tricuspid regurgitation.    WERNER 04/2023 independently reviewed, severe TR, moderate AI, AV valve sclerosis with small ARIELLA

## 2023-08-19 NOTE — NURSING
Patient present to unit with PICC line in place. Dressing change done following sterile technique.

## 2023-08-19 NOTE — SUBJECTIVE & OBJECTIVE
Interval History:  Patient seen and examined at bedside.    No acute events overnight, however K remains elevated; Hgb now >7.   Patient denies ongoing CP. Endorses generalized weakness and chest congestion.  Small volume urine this AM. Heading down to HD following interview; Ca gluc administered, shifting and lokelma deferred. EKG was pending.   Per nursing, episode of melena this AM.   Patient updated regarding care plan.      Review of Systems   Constitutional:  Positive for activity change, appetite change and fatigue. Negative for chills.   HENT:  Positive for congestion. Negative for sore throat and trouble swallowing.    Respiratory:  Positive for cough. Negative for shortness of breath.    Cardiovascular:  Negative for palpitations and leg swelling.   Gastrointestinal:  Negative for abdominal pain, nausea and vomiting.   Skin:  Negative for rash.   Neurological:  Positive for weakness. Negative for headaches.   Psychiatric/Behavioral: Negative.         Objective:    Temp: 98.3 °F (36.8 °C) (08/19/23 0750)  Pulse: 106 (08/19/23 1109)  Resp: 18 (08/19/23 0750)  BP: 135/61 (08/19/23 1000)  SpO2: 97 % (08/19/23 0750)    Weight: 61.2 kg (134 lb 14.7 oz) (08/18/23 2107)    Body mass index is 21.13 kg/m².    Physical Exam  Vitals and nursing note reviewed.   Constitutional:       Appearance: He is ill-appearing. He is not toxic-appearing or diaphoretic.      Interventions: Nasal cannula in place.      Comments: RUE PICC LINE   HENT:      Head: Normocephalic and atraumatic.      Mouth/Throat:      Mouth: Mucous membranes are moist.      Pharynx: Oropharynx is clear.   Eyes:      General: No scleral icterus.        Right eye: No discharge.         Left eye: No discharge.   Cardiovascular:      Rate and Rhythm: Normal rate.      Pulses: Normal pulses.      Heart sounds: Murmur heard.      Comments: LUE AVF with thrill/bruit  Pulmonary:      Effort: Pulmonary effort is normal. No respiratory distress.      Breath  sounds: Normal breath sounds. No wheezing or rales.   Abdominal:      General: Bowel sounds are normal. There is no distension.      Palpations: Abdomen is soft.      Tenderness: There is no abdominal tenderness. There is no guarding or rebound.   Musculoskeletal:      Cervical back: Neck supple. No tenderness.      Right lower leg: No edema.      Left lower leg: No edema.   Lymphadenopathy:      Cervical: No cervical adenopathy.   Skin:     General: Skin is warm.      Findings: No rash.   Neurological:      General: No focal deficit present.      Mental Status: He is alert and oriented to person, place, and time.   Psychiatric:         Behavior: Behavior normal.         Significant Labs: All pertinent labs within the past 24 hours have been reviewed.    Recent Results (from the past 24 hour(s))   CBC Without Differential    Collection Time: 08/18/23  9:45 PM   Result Value Ref Range    WBC 20.83 (H) 3.90 - 12.70 K/uL    RBC 2.59 (L) 4.60 - 6.20 M/uL    Hemoglobin 7.6 (L) 14.0 - 18.0 g/dL    Hematocrit 23.8 (L) 40.0 - 54.0 %    MCV 92 82 - 98 fL    MCH 29.3 27.0 - 31.0 pg    MCHC 31.9 (L) 32.0 - 36.0 g/dL    RDW 17.9 (H) 11.5 - 14.5 %    Platelets 212 150 - 450 K/uL    MPV 10.7 9.2 - 12.9 fL   Renal Function Panel    Collection Time: 08/18/23  9:45 PM   Result Value Ref Range    Glucose 140 (H) 70 - 110 mg/dL    Sodium 139 136 - 145 mmol/L    Potassium 5.8 (H) 3.5 - 5.1 mmol/L    Chloride 95 95 - 110 mmol/L    CO2 26 23 - 29 mmol/L    BUN 85 (H) 8 - 23 mg/dL    Calcium 9.0 8.7 - 10.5 mg/dL    Creatinine 9.5 (H) 0.5 - 1.4 mg/dL    Albumin 2.3 (L) 3.5 - 5.2 g/dL    Phosphorus 4.6 (H) 2.7 - 4.5 mg/dL    eGFR 4.9 (A) >60 mL/min/1.73 m^2    Anion Gap 18 (H) 8 - 16 mmol/L   Lactic Acid, Plasma    Collection Time: 08/18/23  9:45 PM   Result Value Ref Range    Lactate (Lactic Acid) 1.0 0.5 - 2.2 mmol/L   Troponin I    Collection Time: 08/18/23  9:45 PM   Result Value Ref Range    Troponin I 2.884 (H) 0.000 - 0.026 ng/mL    Magnesium    Collection Time: 08/18/23  9:45 PM   Result Value Ref Range    Magnesium 2.2 1.6 - 2.6 mg/dL   APTT    Collection Time: 08/18/23  9:45 PM   Result Value Ref Range    aPTT 25.5 21.0 - 32.0 sec   Protime-INR    Collection Time: 08/18/23  9:45 PM   Result Value Ref Range    Prothrombin Time 11.0 9.0 - 12.5 sec    INR 1.0 0.8 - 1.2   Type & Screen    Collection Time: 08/18/23  9:45 PM   Result Value Ref Range    Group & Rh A POS     Indirect Rosa M NEG     Specimen Outdate 08/21/2023 23:59    Iron and TIBC    Collection Time: 08/18/23  9:45 PM   Result Value Ref Range    Iron 32 (L) 45 - 160 ug/dL    Transferrin 186 (L) 200 - 375 mg/dL    TIBC 275 250 - 450 ug/dL    Saturated Iron 12 (L) 20 - 50 %   Ferritin    Collection Time: 08/18/23  9:45 PM   Result Value Ref Range    Ferritin 2,625 (H) 20.0 - 300.0 ng/mL   Folate    Collection Time: 08/18/23  9:45 PM   Result Value Ref Range    Folate 11.7 4.0 - 24.0 ng/mL   Vitamin B12    Collection Time: 08/18/23  9:45 PM   Result Value Ref Range    Vitamin B-12 625 210 - 950 pg/mL   Prepare RBC 1 Unit    Collection Time: 08/18/23  9:45 PM   Result Value Ref Range    UNIT NUMBER [id number]     Product Code U5166A12     DISPENSE STATUS ISSUED     CODING SYSTEM [id number]     Unit Blood Type Code 0600     Unit Blood Type A NEG     Unit Expiration [id number]     CROSSMATCH INTERPRETATION Compatible    POCT glucose    Collection Time: 08/18/23 11:58 PM   Result Value Ref Range    POCT Glucose 223 (H) 70 - 110 mg/dL   POCT glucose    Collection Time: 08/19/23  1:22 AM   Result Value Ref Range    POCT Glucose 227 (H) 70 - 110 mg/dL   POCT glucose    Collection Time: 08/19/23  4:11 AM   Result Value Ref Range    POCT Glucose 180 (H) 70 - 110 mg/dL   Hemoglobin A1c if not done in past 3 months    Collection Time: 08/19/23  5:33 AM   Result Value Ref Range    Hemoglobin A1C 5.1 4.0 - 5.6 %    Estimated Avg Glucose 100 68 - 131 mg/dL   Potassium    Collection Time:  08/19/23  5:33 AM   Result Value Ref Range    Potassium 6.1 (H) 3.5 - 5.1 mmol/L   Troponin I    Collection Time: 08/19/23  5:33 AM   Result Value Ref Range    Troponin I 3.793 (H) 0.000 - 0.026 ng/mL   Lipid panel    Collection Time: 08/19/23  5:33 AM   Result Value Ref Range    Cholesterol 106 (L) 120 - 199 mg/dL    Triglycerides 104 30 - 150 mg/dL    HDL 27 (L) 40 - 75 mg/dL    LDL Cholesterol 58.2 (L) 63.0 - 159.0 mg/dL    HDL/Cholesterol Ratio 25.5 20.0 - 50.0 %    Total Cholesterol/HDL Ratio 3.9 2.0 - 5.0    Non-HDL Cholesterol 79 mg/dL   CBC Auto Differential    Collection Time: 08/19/23  5:33 AM   Result Value Ref Range    WBC 17.57 (H) 3.90 - 12.70 K/uL    RBC 2.89 (L) 4.60 - 6.20 M/uL    Hemoglobin 8.5 (L) 14.0 - 18.0 g/dL    Hematocrit 25.5 (L) 40.0 - 54.0 %    MCV 88 82 - 98 fL    MCH 29.4 27.0 - 31.0 pg    MCHC 33.3 32.0 - 36.0 g/dL    RDW 17.2 (H) 11.5 - 14.5 %    Platelets 181 150 - 450 K/uL    MPV 10.8 9.2 - 12.9 fL    Immature Granulocytes 0.5 0.0 - 0.5 %    Gran # (ANC) 15.1 (H) 1.8 - 7.7 K/uL    Immature Grans (Abs) 0.08 (H) 0.00 - 0.04 K/uL    Lymph # 0.9 (L) 1.0 - 4.8 K/uL    Mono # 1.5 (H) 0.3 - 1.0 K/uL    Eos # 0.0 0.0 - 0.5 K/uL    Baso # 0.03 0.00 - 0.20 K/uL    nRBC 0 0 /100 WBC    Gran % 85.7 (H) 38.0 - 73.0 %    Lymph % 5.2 (L) 18.0 - 48.0 %    Mono % 8.4 4.0 - 15.0 %    Eosinophil % 0.0 0.0 - 8.0 %    Basophil % 0.2 0.0 - 1.9 %    Differential Method Automated    POCT glucose    Collection Time: 08/19/23  8:03 AM   Result Value Ref Range    POCT Glucose 188 (H) 70 - 110 mg/dL   Potassium    Collection Time: 08/19/23  8:30 AM   Result Value Ref Range    Potassium 6.1 (H) 3.5 - 5.1 mmol/L       Significant Imaging: I have reviewed all pertinent imaging results/findings within the past 24 hours.

## 2023-08-19 NOTE — NURSING
All morning Lab collected and send to lab with requisition form with 2 signature due to Epic downtime

## 2023-08-19 NOTE — ASSESSMENT & PLAN NOTE
-on epmiric therapy from recent August admit @ [facility], hx of treated syphilis in 2013, but recent RPR/FTA positive studies.   -PICC line placed for continuous home infusion Pen G 8million units/daily End date is 08/23/23. Discussed with clinical pharmD tonight - will order per protocol Pen G 10million units continuous infusion

## 2023-08-19 NOTE — ASSESSMENT & PLAN NOTE
phil-uncontrolled  -Continue Carvedilol  -will add short acting nitrates pending patient's trend - has received multiple doses 0.4mg SL NTG today (4 total)

## 2023-08-19 NOTE — ASSESSMENT & PLAN NOTE
-FOBT positive at [facility] ED, s/p 2unit PRBC  -repeat CBC now and will trend  -pt with ESRD has anemia related to CKD also   -Monitor for symptom improvement s/p PRBC transfusion  -IV protonix BID - keep on clear liquids - NPO @ midnight - GI consultation in AM

## 2023-08-19 NOTE — CONSULTS
[facility] ([place] [address])  Cardiology  Consult Note    Patient Name: Margaret Hernández Jr.  MRN: [id number]  Admission Date: 8/18/2023  Hospital Length of Stay: 1 days  Code Status: Full Code   Attending Provider: Marsha Dixon MD   Consulting Provider: Cortes Brennan MD  Primary Care Physician: Stephanie Hernandez NP  Principal Problem:Acute blood loss anemia    Patient information was obtained from patient, past medical records and ER records.     Inpatient consult to Cardiology  Consult performed by: Cortes Brennan MD  Consult ordered by: Lyle Aburto MD  Reason for consult: NSTEMI  Assessment/Recommendations: Please see A/P        Subjective:     Reason for consult:  NSTEMI     HPI:   Patient is an 83 year old male with anemia, ESRD on HD, HFrEF (most recent LVEF 30%) with mod RV systolic dysfunction, atrial flutter on Eliquis status post DCCV April 2023, neurosyphilis, HLD, HTN presented to  [facility] on August 18 with chest pain.  On initial presentation patient described 5 hours of substernal nonradiating chest pain that started 8/17.  His chest pain resolved in the emergency room at the outside hospital after 4 doses of NTG SL.  EKG findings were concerning for ST elevations, AVR, with inferior and lateral ST depressions in  V3, V4, V6, I.  and patient was loaded with aspirin, and Brilinta, then started on a heparin drip.  He was evaluated by Cardiology at [facility], and was noted to have hemoglobin 6.1 with concerns for GI bleed.  Patient was transfused 2 RBCs and was transferred to [facility] for further evaluation of suspected GI bleed and type 2 NSTEMI.  On presentation to [facility] patient was hypertensive blood pressure 175/74, afebrile satting 97 % on 2 L NC.  His labs were concerning for leukocytosis  20.83. hgb 7.6. K 5.8. BNP 1523. Trop 2.884.  Transfused 1 RBC to a goal hemoglobin of 8 and admitted to Internal Medicine for further workup.  At home patient is adherent to  Eliquis 2.5 mg b.i.d., Coreg 25 mg b.i.d., Lasix 40 mg b.i.d..       Today he denies angina, JACKSON, shortness of breath, palpitations, presyncope, syncope, leg swelling, PND, or orthopnea. He reports that he works out at [employer] on nondialysis days. He states that he has never had chest discomfort nor shortness of breath while working out.     Last Echocardiogram:  3/28/2023  The left ventricle is normal in size with concentric remodeling and moderately decreased systolic function.  The estimated ejection fraction is 30%.  There is moderate left ventricular global hypokinesis.  Indeterminate left ventricular diastolic function.  Moderate right ventricular enlargement with moderately reduced right ventricular systolic function.  Severe left atrial enlargement.  Moderate right atrial enlargement.  There is mild aortic valve stenosis.  Moderate aortic regurgitation.  Moderate-to-severe mitral regurgitation.  Severe tricuspid regurgitation.     Last Nuclear stress:  5/2022  This is an equivocal perfusion study.   This scan is suggestive of low risk for future cardiovascular events.   Small reversible perfusion abnormality of mild intensity in the basal inferior segment. Small reversible perfusion abnormality of moderate intensity in the mid soila lateral segment. Small fixed perfusion abnormality of moderate intensity in the mid infero lateral segment.   Only partial improvement with prone imaging is noted.   The left ventricular cavity is noted to be mildly enlarged on the stress study. The left ventricular ejection fraction was calculated to be 46% and left ventricular global function is normal.   The study quality is good.       Past Medical History:   Diagnosis Date    Abdominal hernia     Anemia     Chronic kidney disease     ED (erectile dysfunction)     Glaucoma     Hard of hearing     Hyperlipidemia     Hypertension     Sinus congestion        Past Surgical History:   Procedure Laterality Date    AV  FISTULA PLACEMENT Left 04/09/2018    BIOPSY OF TEMPORAL ARTERY Left 03/07/2023    Procedure: BIOPSY, ARTERY, TEMPORAL;  Surgeon: Yovany Slo MD;  Location: [facility];  Service: Cardiovascular;  Laterality: Left;  carotid artery    CATARACT EXTRACTION      FISTULOGRAM Left 01/14/2020    Procedure: Fistulogram;  Surgeon: Milton Renee MD;  Location: [facility] CATH LAB;  Service: Peripheral Vascular;  Laterality: Left;    FISTULOGRAM Left 03/10/2020    Procedure: Fistulogram;  Surgeon: Milton Renee MD;  Location: [facility] CATH LAB;  Service: Peripheral Vascular;  Laterality: Left;    FISTULOGRAM Left 06/16/2020    Procedure: Fistulogram;  Surgeon: Milton Renee MD;  Location: [facility] CATH LAB;  Service: Peripheral Vascular;  Laterality: Left;    FISTULOGRAM Left 02/08/2022    Procedure: Fistulogram;  Surgeon: Milton Renee MD;  Location: [facility] CATH LAB;  Service: Peripheral Vascular;  Laterality: Left;    HERNIA REPAIR      PERCUTANEOUS TRANSLUMINAL ANGIOPLASTY OF ARTERIOVENOUS FISTULA Left 06/16/2020    Procedure: PTA, AV Graft;  Surgeon: Milton Renee MD;  Location: [facility] CATH LAB;  Service: Peripheral Vascular;  Laterality: Left;    PERIPHERALLY INSERTED CENTRAL CATHETER INSERTION N/A 8/8/2023    Procedure: Insertion-picc;  Surgeon: Martin Alston MD;  Location: [facility] CATH LAB;  Service: Cardiology;  Laterality: N/A;    PERMACATH PLACEMENT Right     PLACEMENT OF ARTERIOVENOUS GRAFT Left 01/27/2020    Procedure: INSERTION, GRAFT, ARTERIOVENOUS;  Surgeon: Milton Renee MD;  Location: [facility];  Service: General;  Laterality: Left;    PROSTATE SURGERY      SKIN BIOPSY      TRANSESOPHAGEAL ECHOCARDIOGRAM WITH POSSIBLE CARDIOVERSION (WERNER W/ POSS CARDIOVERSION) N/A 04/11/2023    Procedure: TRANSESOPHAGEAL ECHOCARDIOGRAM WITH POSSIBLE CARDIOVERSION (WERNER W/ POSS CARDIOVERSION);  Surgeon: Martin Alston MD;  Location: [facility] CATH LAB;  Service: Cardiology;  Laterality: N/A;    ULTRASOUND  GUIDANCE  06/16/2020    Procedure: Ultrasound Guidance;  Surgeon: Milton Renee MD;  Location: [facility] CATH LAB;  Service: Peripheral Vascular;;       Review of patient's allergies indicates:   Allergen Reactions    Benazepril Swelling    Diphenhydramine hcl      Other reaction(s): Unknown    Gabapentin Other (See Comments)     Double vision and headaches       Current Facility-Administered Medications on File Prior to Encounter   Medication    HYDROmorphone (PF) injection 0.5 mg    [COMPLETED] nitroGLYCERIN SL tablet 0.4 mg    ondansetron injection 4 mg    oxyCODONE immediate release tablet 5 mg    [COMPLETED] pantoprazole injection 80 mg    [DISCONTINUED] 0.9%  NaCl infusion (for blood administration)     Current Outpatient Medications on File Prior to Encounter   Medication Sig    apixaban (ELIQUIS) 2.5 mg Tab Take 1 tablet (2.5 mg total) by mouth 2 (two) times daily.    b complex vitamins tablet Take 1 tablet by mouth once daily.    blood sugar diagnostic Strp To check BG 2 times daily, to use with insurance preferred meter True Metric    blood-glucose meter kit To check BG 2 times daily, to use with insurance preferred meter True Metric    calcium acetate,phosphat bind, (PHOSLO) 667 mg capsule TAKE ONE CAPSULE BY MOUTH THREE TIMES DAILY WITH MEALS    carvediloL (COREG) 12.5 MG tablet Take 2 tablets (25 mg total) by mouth 2 (two) times daily.    cetyl,stear.alcoh-prop gly-sls (CETAPHIL) Crea Apply topically.    dextrose 5 % (D5W) SolP 100 mL with penicillin G potassium 5 million unit SolR 8 Million Units Inject 8 Million Units into the vein continuous. for 12 days    dorzolamide-timolol 2-0.5% (COSOPT) 22.3-6.8 mg/mL ophthalmic solution Place 1 drop into both eyes 2 (two) times daily.    fluticasone propionate (FLONASE) 50 mcg/actuation nasal spray SHAKE LIQUID AND USE 1 SPRAY(50 MCG) IN EACH NOSTRIL EVERY DAY    furosemide (LASIX) 40 MG tablet Take 1 tablet (40 mg total) by mouth 2 (two) times daily. Take  on non dialysis days    lancets Misc To check BG 2 times daily, to use with insurance preferred meter True Metric    lidocaine-prilocaine (EMLA) cream APPLY SMALL AMOUNT TO ACCESS SITE 1-2 HOURS BEFORE TREATMENT. COVER AREA WITH AN OCCLUSIVE DRESSING AS DIRECTED.    multivit-min/FA/lycopen/lutein (CENTRUM SILVER MEN ORAL) Take by mouth.     Family History       Problem Relation (Age of Onset)    Anuerysm Sister    Diabetes Mother    No Known Problems Father, Brother          Tobacco Use    Smoking status: Never    Smokeless tobacco: Never   Substance and Sexual Activity    Alcohol use: Not Currently    Drug use: Yes     Types: Hashish    Sexual activity: Not Currently     Partners: Female     Review of Systems   Constitutional: Positive for malaise/fatigue. Negative for chills, decreased appetite, diaphoresis, fever and weight gain.   HENT:  Negative for congestion and ear discharge.    Eyes:  Negative for blurred vision.   Cardiovascular:  Negative for chest pain, dyspnea on exertion, irregular heartbeat, leg swelling, orthopnea, palpitations and paroxysmal nocturnal dyspnea.   Respiratory:  Negative for cough, shortness of breath, snoring and sputum production.    Skin:  Negative for color change, dry skin and rash.   Musculoskeletal:  Negative for back pain, falls, joint pain and neck pain.   Gastrointestinal:  Negative for bloating, abdominal pain, constipation and diarrhea.   Genitourinary:  Negative for dysuria, flank pain, hematuria and hesitancy.   Neurological:  Negative for focal weakness, headaches, numbness and seizures.   Psychiatric/Behavioral:  Negative for altered mental status, depression and hallucinations.      Objective:     Vital Signs (Most Recent):  Temp: 98.3 °F (36.8 °C) (08/19/23 0750)  Pulse: 89 (08/19/23 0750)  Resp: 18 (08/19/23 0750)  BP: 139/60 (08/19/23 0750)  SpO2: 97 % (08/19/23 0750) Vital Signs (24h Range):  Temp:  [98.2 °F (36.8 °C)-99.3 °F (37.4 °C)] 98.3 °F (36.8 °C)  Pulse:   [] 89  Resp:  [15-27] 18  SpO2:  [97 %-100 %] 97 %  BP: (100-210)/(39-82) 139/60     Weight: 61.2 kg (134 lb 14.7 oz)  Body mass index is 21.13 kg/m².    SpO2: 97 %         Intake/Output Summary (Last 24 hours) at 8/19/2023 0849  Last data filed at 8/19/2023 0500  Gross per 24 hour   Intake 378.25 ml   Output --   Net 378.25 ml       Lines/Drains/Airways       Peripherally Inserted Central Catheter Line  Duration             PICC Single Lumen 08/08/23 1421 right basilic 10 days              Peripheral Intravenous Line  Duration                  Hemodialysis AV Fistula 03/27/23 1911 Left upper arm 144 days         Peripheral IV - Single Lumen 08/18/23 0905 18 G Anterior;Right Forearm <1 day                     Physical Exam  Constitutional:       General: He is not in acute distress.     Appearance: He is not ill-appearing.   HENT:      Nose: Nose normal.      Mouth/Throat:      Mouth: Mucous membranes are moist.   Eyes:      Pupils: Pupils are equal, round, and reactive to light.   Neck:      Comments: No JVD appreciated   Cardiovascular:      Rate and Rhythm: Normal rate and regular rhythm.      Pulses: Normal pulses.      Heart sounds: Murmur (Grade 2/6 systolic crescendo-decrescendo murmur) heard.   Pulmonary:      Effort: Pulmonary effort is normal. No respiratory distress.      Breath sounds: Rales present. No wheezing.   Abdominal:      General: Abdomen is flat. There is no distension.      Palpations: Abdomen is soft.      Tenderness: There is no right CVA tenderness or left CVA tenderness.   Musculoskeletal:         General: No swelling.      Cervical back: Normal range of motion and neck supple. No tenderness.      Right lower leg: No edema.      Left lower leg: No edema.      Comments: LUE AV fistula with good thrill   Skin:     General: Skin is warm.      Coloration: Skin is not pale.      Findings: No rash.   Neurological:      General: No focal deficit present.      Mental Status: He is alert and  "oriented to person, place, and time.      Motor: No weakness.          Significant Labs: ABG: No results for input(s): "PH", "PCO2", "HCO3", "POCSATURATED", "BE" in the last 48 hours., Blood Culture: No results for input(s): "LABBLOO" in the last 48 hours., BMP:   Recent Labs   Lab 08/18/23 0517 08/18/23 2145 08/19/23  0533   * 140*  --     139  --    K 4.8 5.8* 6.1*   CL 92* 95  --    CO2 28 26  --    BUN 72* 85*  --    CREATININE 8.7* 9.5*  --    CALCIUM 9.7 9.0  --    MG  --  2.2  --    , CMP   Recent Labs   Lab 08/18/23 0517 08/18/23 2145 08/19/23  0533    139  --    K 4.8 5.8* 6.1*   CL 92* 95  --    CO2 28 26  --    * 140*  --    BUN 72* 85*  --    CREATININE 8.7* 9.5*  --    CALCIUM 9.7 9.0  --    PROT 6.8  --   --    ALBUMIN 2.6* 2.3*  --    BILITOT 0.5  --   --    ALKPHOS 41*  --   --    AST 15  --   --    ALT 9*  --   --    ANIONGAP 18* 18*  --    , CBC   Recent Labs   Lab 08/18/23 0517 08/18/23 2145 08/19/23  0533   WBC 11.42  11.42 20.83* 17.57*   HGB 6.1*  6.1* 7.6* 8.5*   HCT 20.4*  20.4* 23.8* 25.5*     247 212 181   , INR   Recent Labs   Lab 08/18/23 0517 08/18/23 2145   INR 1.0 1.0   , Lipid Panel   Recent Labs   Lab 08/19/23  0533   CHOL 106*   HDL 27*   LDLCALC 58.2*   TRIG 104   CHOLHDL 25.5   ,   Pathology Results  (Last 10 years)                 03/07/23 1429  Specimen to Pathology, Surgery Other (vascular) Final result    Narrative:  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    EKG August 18th 6:39 a.m.  Sinus tachycardia with  Significant ST depression in the inferior and anterolateral leads.  ST-elevation in AVR.    EKG August 18th 9:09 PM  Sinus rhythm with resolution of ST changes.  LVH noted.     TTE 03/2023 independently reviewed, showed LVEF 30-35%, Moderate RV enlargement with moderately reduced RV systolic function. There is mild aortic valve stenosis. Moderate aortic regurgitation. Moderate-to-severe mitral regurgitation. Severe tricuspid regurgitation.    WERNER 04/2023 independently reviewed, severe TR, moderate AI, AV valve sclerosis with small ARIELLA    Assessment and Plan:     Type II NSTEMI (demand ischemia)  Patient is an 83 year old male with anemia, ESRD on HD, HFrEF( most recent EF 30%), atrial flutter on Eliquis status post DCCV April 2023, neurosyphilis, HLD, HTN [facility]. Transferred to [place] with Cardiology consultation, and then transferred to [facility] for higher level of care. ECG 08/18/2023 at 6 AM independently reviewed, showed diffuse ST depressions in the anterior, inferior, and lateral leads with aVR elevation. ECG 08/19/2023 independently reviewed, showed NST, 1st degree AV block, and complete resolution of ST-T changes. Patient continues to report being angina free. Patient had troponin elevation 0.094 --> 2.88--> 3.79 --> 4.21. While biomarker elevation is concerning for underlying coronary artery disease, this presentation is not consistent with acute plaque rupture. This presentation is likely a Type II MI secondary to demand ischemia from acute blood loss anemia (Hgb 6.1 g/dL at OSH).     - Agree with GI consultation to evaluate etiology of acute blood loss anemia  - Echo was already ordered, will follow  - Recommend continuing anti-anginal therapy with beta - blocker; ideally he should also be started on aspirin for anti-anginal therapy however this  appears to be on hold at this time due to the concerns of acute blood loss anemia. Would recommend restarting aspirin 81 mg PO Daily.   - Patient is already at target LDL < 70 mg/dL (currently 58 mg/dL) without statin    HFrEF (heart failure with reduced ejection fraction)  Patient has known LVEF 30-35% from TTE 03/2023 and WERNER 04/2023. Etiology may be ischemic cardiomyopathy but we don't have any coronary angiogram or stress test in our system to prove otherwise. Patient will benefit from an ischemic workup to evaluate the etiology of his cardiomyopathy.     - Will follow repeat TTE from 08/19/2023  - If LVEF remains < 40%, patient would benefit from optimizing HFrEF GDMT       - Already on carvedilol, can continue      - Consider starting ACE/ARB/ARNI      - MRA would not be indicated considering ESRD status      - SGLT2-I not indicated with current available data considering ESRD status  - Patient should follow up with EP outpatient to discuss installation of an ICD if ischemic cardiomyopathy; if non-ischemic, data is controversial     Atrial flutter  S/p DCCV 04/2023  AC with Eliquis 2.5 mg PO BID (age, Cr) when stable    ESRD on dialysis  LUE AVF with good thrill  Inpatient hemodialysis, follow up with Nephrology    Hyperkalemia  Nephrology managing with iHD    VTE Risk Mitigation (From admission, onward)           Ordered     heparin (porcine) injection 1,000 Units  As needed (PRN)         08/19/23 0718     Reason for No Pharmacological VTE Prophylaxis  Once        Question:  Reasons:  Answer:  Risk of Bleeding    08/18/23 2104     IP VTE HIGH RISK PATIENT  Once         08/18/23 2104     Place sequential compression device  Until discontinued         08/18/23 2104                Discussed with staff, Dr Chapin.     Thank you for your consult. We will sign off. Please contact us if you have any additional questions.    Cortes Brennan MD  Cardiology PGY6  [facility] ([place]

## 2023-08-19 NOTE — NURSING
Blood transfusion rate change to 120ml/hr patient unable to tolerate rate change. Gladis HUSAIN was notified of patient discomfort and rate was change to 85ml/hr patient tolerate rate change no discomfort noted.

## 2023-08-19 NOTE — ASSESSMENT & PLAN NOTE
-patient in sinus rhythm-rate controlled., continue cardiac monitoring and coreg  -Currently Apixaban is contraindicated due to concern for possible Occult GI bleeding.   -

## 2023-08-19 NOTE — SUBJECTIVE & OBJECTIVE
Past Medical History:   Diagnosis Date    Abdominal hernia     Anemia     Chronic kidney disease     ED (erectile dysfunction)     Glaucoma     Hard of hearing     Hyperlipidemia     Hypertension     Sinus congestion        Past Surgical History:   Procedure Laterality Date    AV FISTULA PLACEMENT Left 04/09/2018    BIOPSY OF TEMPORAL ARTERY Left 03/07/2023    Procedure: BIOPSY, ARTERY, TEMPORAL;  Surgeon: Yovany Sol MD;  Location: [facility];  Service: Cardiovascular;  Laterality: Left;  carotid artery    CATARACT EXTRACTION      FISTULOGRAM Left 01/14/2020    Procedure: Fistulogram;  Surgeon: Milton Renee MD;  Location: [facility] CATH LAB;  Service: Peripheral Vascular;  Laterality: Left;    FISTULOGRAM Left 03/10/2020    Procedure: Fistulogram;  Surgeon: Milton Renee MD;  Location: [facility] CATH LAB;  Service: Peripheral Vascular;  Laterality: Left;    FISTULOGRAM Left 06/16/2020    Procedure: Fistulogram;  Surgeon: Milton Renee MD;  Location: [facility] CATH LAB;  Service: Peripheral Vascular;  Laterality: Left;    FISTULOGRAM Left 02/08/2022    Procedure: Fistulogram;  Surgeon: Milton Renee MD;  Location: [facility] CATH LAB;  Service: Peripheral Vascular;  Laterality: Left;    HERNIA REPAIR      PERCUTANEOUS TRANSLUMINAL ANGIOPLASTY OF ARTERIOVENOUS FISTULA Left 06/16/2020    Procedure: PTA, AV Graft;  Surgeon: Milton Renee MD;  Location: [facility] CATH LAB;  Service: Peripheral Vascular;  Laterality: Left;    PERIPHERALLY INSERTED CENTRAL CATHETER INSERTION N/A 8/8/2023    Procedure: Insertion-picc;  Surgeon: Martin Alston MD;  Location: [facility] CATH LAB;  Service: Cardiology;  Laterality: N/A;    PERMACATH PLACEMENT Right     PLACEMENT OF ARTERIOVENOUS GRAFT Left 01/27/2020    Procedure: INSERTION, GRAFT, ARTERIOVENOUS;  Surgeon: Milton Renee MD;  Location: [facility];  Service: General;  Laterality: Left;    PROSTATE SURGERY      SKIN BIOPSY      TRANSESOPHAGEAL ECHOCARDIOGRAM WITH  POSSIBLE CARDIOVERSION (WERNER W/ POSS CARDIOVERSION) N/A 04/11/2023    Procedure: TRANSESOPHAGEAL ECHOCARDIOGRAM WITH POSSIBLE CARDIOVERSION (WERNER W/ POSS CARDIOVERSION);  Surgeon: Martin Alston MD;  Location: [facility] CATH LAB;  Service: Cardiology;  Laterality: N/A;    ULTRASOUND GUIDANCE  06/16/2020    Procedure: Ultrasound Guidance;  Surgeon: Milton Renee MD;  Location: [facility] CATH LAB;  Service: Peripheral Vascular;;       Review of patient's allergies indicates:   Allergen Reactions    Benazepril Swelling    Diphenhydramine hcl      Other reaction(s): Unknown    Gabapentin Other (See Comments)     Double vision and headaches       Current Facility-Administered Medications on File Prior to Encounter   Medication    [COMPLETED] aspirin tablet 325 mg    [COMPLETED] heparin 25,000 units in dextrose 5% (100 units/ml) IV bolus from bag INITIAL BOLUS (max bolus 4000 units)    HYDROmorphone (PF) injection 0.5 mg    [COMPLETED] morphine injection 4 mg    [COMPLETED] nitroGLYCERIN SL tablet 0.4 mg    [COMPLETED] nitroGLYCERIN SL tablet 0.4 mg    [COMPLETED] nitroGLYCERIN SL tablet 0.4 mg    [COMPLETED] nitroGLYCERIN SL tablet 0.4 mg    ondansetron injection 4 mg    oxyCODONE immediate release tablet 5 mg    [COMPLETED] pantoprazole injection 80 mg    [COMPLETED] ticagrelor tablet 180 mg    [DISCONTINUED] 0.9%  NaCl infusion (for blood administration)    [DISCONTINUED] heparin (porcine) 5,000 unit/mL injection    [DISCONTINUED] heparin 25,000 units in dextrose 5% (100 units/ml) IV bolus from bag - ADDITIONAL PRN BOLUS - 30 units/kg (max bolus 4000 units)    [DISCONTINUED] heparin 25,000 units in dextrose 5% (100 units/ml) IV bolus from bag - ADDITIONAL PRN BOLUS - 30 units/kg (max bolus 4000 units)    [DISCONTINUED] heparin 25,000 units in dextrose 5% (100 units/ml) IV bolus from bag - ADDITIONAL PRN BOLUS - 60 units/kg (max bolus 4000 units)    [DISCONTINUED] heparin 25,000 units in dextrose 5% (100 units/ml) IV bolus  from bag - ADDITIONAL PRN BOLUS - 60 units/kg (max bolus 4000 units)    [DISCONTINUED] heparin 25,000 units in dextrose 5% 250 mL (100 units/mL) infusion LOW INTENSITY nomogram - [facility]    [DISCONTINUED] heparin 25,000 units in dextrose 5% 250 mL (100 units/mL) infusion LOW INTENSITY nomogram - [facility]    [DISCONTINUED] nitroGLYCERIN (NITROSTAT) 0.4 MG SL tablet    [DISCONTINUED] NORepinephrine bitartrate-D5W 4 mg/250 mL (16 mcg/mL) infusion Soln     Current Outpatient Medications on File Prior to Encounter   Medication Sig    apixaban (ELIQUIS) 2.5 mg Tab Take 1 tablet (2.5 mg total) by mouth 2 (two) times daily.    b complex vitamins tablet Take 1 tablet by mouth once daily.    blood sugar diagnostic Strp To check BG 2 times daily, to use with insurance preferred meter True Metric    blood-glucose meter kit To check BG 2 times daily, to use with insurance preferred meter True Metric    calcium acetate,phosphat bind, (PHOSLO) 667 mg capsule TAKE ONE CAPSULE BY MOUTH THREE TIMES DAILY WITH MEALS    carvediloL (COREG) 12.5 MG tablet Take 2 tablets (25 mg total) by mouth 2 (two) times daily.    cetyl,stear.alcoh-prop gly-sls (CETAPHIL) Crea Apply topically.    dextrose 5 % (D5W) SolP 100 mL with penicillin G potassium 5 million unit SolR 8 Million Units Inject 8 Million Units into the vein continuous. for 12 days    dorzolamide-timolol 2-0.5% (COSOPT) 22.3-6.8 mg/mL ophthalmic solution Place 1 drop into both eyes 2 (two) times daily.    fluticasone propionate (FLONASE) 50 mcg/actuation nasal spray SHAKE LIQUID AND USE 1 SPRAY(50 MCG) IN EACH NOSTRIL EVERY DAY    furosemide (LASIX) 40 MG tablet Take 1 tablet (40 mg total) by mouth 2 (two) times daily. Take on non dialysis days    lancets Misc To check BG 2 times daily, to use with insurance preferred meter True Metric    lidocaine-prilocaine (EMLA) cream APPLY SMALL AMOUNT TO ACCESS SITE 1-2 HOURS BEFORE TREATMENT. COVER AREA WITH AN OCCLUSIVE DRESSING AS DIRECTED.     multivit-min/FA/lycopen/lutein (CENTRUM SILVER MEN ORAL) Take by mouth.     Family History       Problem Relation (Age of Onset)    Anuerysm Sister    Diabetes Mother    No Known Problems Father, Brother          Tobacco Use    Smoking status: Never    Smokeless tobacco: Never   Substance and Sexual Activity    Alcohol use: Not Currently    Drug use: Yes     Types: Hashish    Sexual activity: Not Currently     Partners: Female     Review of Systems   Constitutional:  Positive for activity change, appetite change and fatigue.   HENT:  Negative for sore throat and trouble swallowing.    Cardiovascular:  Negative for palpitations and leg swelling.   Gastrointestinal:  Negative for nausea and vomiting.   Genitourinary: Negative.    Musculoskeletal: Negative.    Skin: Negative.    Neurological:  Positive for weakness.   Psychiatric/Behavioral: Negative.       Objective:     Vital Signs (Most Recent):  Temp: 98.8 °F (37.1 °C) (08/18/23 1924)  Pulse: 102 (08/18/23 1947)  Resp: 20 (08/18/23 1947)  BP: (!) 146/58 (08/18/23 1947)  SpO2: 99 % (08/18/23 1947) Vital Signs (24h Range):  Temp:  [98 °F (36.7 °C)-98.8 °F (37.1 °C)] 98.8 °F (37.1 °C)  Pulse:  [] 102  Resp:  [14-32] 20  SpO2:  [95 %-100 %] 99 %  BP: (145-210)/(57-82) 146/58        There is no height or weight on file to calculate BMI.     Physical Exam  Constitutional:       Appearance: He is ill-appearing. He is not toxic-appearing.      Interventions: Nasal cannula in place.      Comments: RUE PICC LINE   HENT:      Head: Normocephalic and atraumatic.      Mouth/Throat:      Mouth: Mucous membranes are moist.   Eyes:      General: No scleral icterus.  Cardiovascular:      Rate and Rhythm: Normal rate.      Pulses: Normal pulses.      Heart sounds: Murmur heard.      Comments: LUE AVF with thrill/bruit  Pulmonary:      Effort: Pulmonary effort is normal. No respiratory distress.      Breath sounds: Normal breath sounds. No wheezing or rales.   Abdominal:       "General: Bowel sounds are normal. There is no distension.      Palpations: Abdomen is soft.      Tenderness: There is no abdominal tenderness. There is no guarding or rebound.   Musculoskeletal:      Cervical back: Neck supple. No tenderness.      Right lower leg: No edema.      Left lower leg: No edema.   Lymphadenopathy:      Cervical: No cervical adenopathy.   Skin:     General: Skin is warm.      Findings: No rash.   Neurological:      General: No focal deficit present.      Mental Status: He is alert and oriented to person, place, and time.                Significant Labs: All pertinent labs within the past 24 hours have been reviewed.  CBC:   Recent Labs   Lab 08/18/23 0517   WBC 11.42  11.42   HGB 6.1*  6.1*   HCT 20.4*  20.4*     247     CMP:   Recent Labs   Lab 08/18/23 0517      K 4.8   CL 92*   CO2 28   *   BUN 72*   CREATININE 8.7*   CALCIUM 9.7   PROT 6.8   ALBUMIN 2.6*   BILITOT 0.5   ALKPHOS 41*   AST 15   ALT 9*   ANIONGAP 18*     Cardiac Markers:   Recent Labs   Lab 08/18/23 0517   BNP 1,523*     Coagulation:   Recent Labs   Lab 08/18/23 0517   INR 1.0   APTT 22.9     Lactic Acid: No results for input(s): "LACTATE" in the last 48 hours.  Troponin:   Recent Labs   Lab 08/18/23 0517 08/18/23  0649   TROPONINI 0.064* 0.094       Significant Imaging: I have reviewed all pertinent imaging results/findings within the past 24 hours.    Chest x-ray had no acute findings or significant radiographic change from previous studies.     EKG showed sinus rhythm with first-degree AV block.  Significant ST depression in the inferior and anterolateral leads.  ST-elevation in AVR.  -similar EKG changes noted April 26 April 11: WERNER with ejection fraction 30%, moderate global hypokinesis.  Enlarged right ventricle with moderately reduced right ventricular systolic function.  Severe left atrial enlargement.  Valvular abnormalities noted in the report.    "

## 2023-08-19 NOTE — ASSESSMENT & PLAN NOTE
Last HD reported in EMR as on 8/16,  Missed scheduled dialysis today  -continue relevant home medications  -COnsult Nephrology for continued inpatient HD treatments

## 2023-08-19 NOTE — ASSESSMENT & PLAN NOTE
Last HD reported in EMR as on 8/16,  Missed scheduled dialysis today  -continue relevant home medications  -COnsult Nephrology for continued inpatient HD treatments  -HD this morning

## 2023-08-19 NOTE — CONSULTS
[facility]  Gastroenterology  Consult Note    Patient Name: Margaret Hernández Jr.  MRN: [id number]  Admission Date: 8/18/2023  Hospital Length of Stay: 0 days  Code Status: Full Code   Attending Provider: Marsha Dixon MD   Consulting Provider: Trell Bui DO  Primary Care Physician: Stephanie Hernandez NP  Principal Problem:Type 2 MI (myocardial infarction)    Inpatient consult to Gastroenterology  Consult performed by: Trell Bui DO  Consult ordered by: Lyle Aburto MD        Subjective:     HPI: Margaret Hernández Jr. is a 83 y.o. male with history of AoCD, ESRD on HD, CHFrEF (EF 30%), Aflutter on Eliquis, s/p DCCV, Neurosyphilis currently on treatment (through 8/22), HLD, HTN and history of recent admission to [facility] for presumed neurosyphilis that re-presented to [facility] on 8/18 with chest pain. Initially with concerns for STEMI, transferred to [facility] and determined to have demand ischemia with Hgb of 6.1. S/p 2 units of PRBC's. Then transferred to [facility] for workup of symptomatic anemia with concern for occult GI bleed.     Patient reports that he has been having dark, tarry stools for a month. He normally does not have this discolored type of bowel movement. He denies any oral iron supplement or pepto-bismol use, but does report that he has previously been given IV iron with dialysis. Does not believe he has received Epo, but is overall unsure. He is on AC for A fib. He reports having a colonoscopy many years ago, but is unsure of the results. On arrival, Hgb 6.1-> now 8.7 s/p 2 units of prbc's. HDS.     Past Medical History:   Diagnosis Date    Abdominal hernia     Anemia     Chronic kidney disease     ED (erectile dysfunction)     Glaucoma     Hard of hearing     Hyperlipidemia     Hypertension     Sinus congestion        Past Surgical History:   Procedure Laterality Date    AV FISTULA PLACEMENT Left 04/09/2018    BIOPSY OF TEMPORAL ARTERY Left 03/07/2023    Procedure: BIOPSY,  ARTERY, TEMPORAL;  Surgeon: Yovany Sol MD;  Location: [facility];  Service: Cardiovascular;  Laterality: Left;  carotid artery    CATARACT EXTRACTION      FISTULOGRAM Left 01/14/2020    Procedure: Fistulogram;  Surgeon: Milton Renee MD;  Location: [facility] CATH LAB;  Service: Peripheral Vascular;  Laterality: Left;    FISTULOGRAM Left 03/10/2020    Procedure: Fistulogram;  Surgeon: Milton Renee MD;  Location: [facility] CATH LAB;  Service: Peripheral Vascular;  Laterality: Left;    FISTULOGRAM Left 06/16/2020    Procedure: Fistulogram;  Surgeon: Milton Renee MD;  Location: [facility] CATH LAB;  Service: Peripheral Vascular;  Laterality: Left;    FISTULOGRAM Left 02/08/2022    Procedure: Fistulogram;  Surgeon: Milton Renee MD;  Location: [facility] CATH LAB;  Service: Peripheral Vascular;  Laterality: Left;    HERNIA REPAIR      PERCUTANEOUS TRANSLUMINAL ANGIOPLASTY OF ARTERIOVENOUS FISTULA Left 06/16/2020    Procedure: PTA, AV Graft;  Surgeon: Milton Renee MD;  Location: [facility] CATH LAB;  Service: Peripheral Vascular;  Laterality: Left;    PERIPHERALLY INSERTED CENTRAL CATHETER INSERTION N/A 8/8/2023    Procedure: Insertion-picc;  Surgeon: Martin Alston MD;  Location: [facility];  Service: Cardiology;  Laterality: N/A;    PERMACATH PLACEMENT Right     PLACEMENT OF ARTERIOVENOUS GRAFT Left 01/27/2020    Procedure: INSERTION, GRAFT, ARTERIOVENOUS;  Surgeon: Milton Renee MD;  Location: [facility];  Service: General;  Laterality: Left;    PROSTATE SURGERY      SKIN BIOPSY      TRANSESOPHAGEAL ECHOCARDIOGRAM WITH POSSIBLE CARDIOVERSION (WERNER W/ POSS CARDIOVERSION) N/A 04/11/2023    Procedure: TRANSESOPHAGEAL ECHOCARDIOGRAM WITH POSSIBLE CARDIOVERSION (WERNER W/ POSS CARDIOVERSION);  Surgeon: Martin Alston MD;  Location: [facility] CATH LAB;  Service: Cardiology;  Laterality: N/A;    ULTRASOUND GUIDANCE  06/16/2020    Procedure: Ultrasound Guidance;  Surgeon: Milton Renee MD;  Location:  [facility];  Service: Peripheral Vascular;;       Family History   Problem Relation Age of Onset    Diabetes Mother     No Known Problems Father     Anuerysm Sister     No Known Problems Brother        Social History     Socioeconomic History    Marital status:     Years of education: 12   Tobacco Use    Smoking status: Never    Smokeless tobacco: Never   Substance and Sexual Activity    Alcohol use: Not Currently    Drug use: Yes     Types: Hashish    Sexual activity: Not Currently     Partners: Female     Social Determinants of Health     Financial Resource Strain: Low Risk  (3/2/2023)    Overall Financial Resource Strain (CARDIA)     Difficulty of Paying Living Expenses: Not very hard   Food Insecurity: No Food Insecurity (3/2/2023)    Hunger Vital Sign     Worried About Running Out of Food in the Last Year: Never true     Ran Out of Food in the Last Year: Never true   Transportation Needs: No Transportation Needs (3/2/2023)    PRAPARE - Transportation     Lack of Transportation (Medical): No     Lack of Transportation (Non-Medical): No   Physical Activity: Inactive (3/2/2023)    Exercise Vital Sign     Days of Exercise per Week: 0 days     Minutes of Exercise per Session: 0 min   Stress: No Stress Concern Present (3/2/2023)    [employer] - Occupational Stress Questionnaire     Feeling of Stress : Not at all   Social Connections: Moderately Integrated (3/2/2023)    Social Connection and Isolation Panel [NHANES]     Frequency of Communication with Friends and Family: More than three times a week     Frequency of Social Gatherings with Friends and Family: Twice a week     Attends [employer] Services: More than 4 times per year     Active Member of Clubs or Organizations: No     Attends Club or Organization Meetings: Never     Marital Status:    Housing Stability: Unknown (3/2/2023)    Housing Stability Vital Sign     Unable to Pay for Housing in the Last Year: No      Unstable Housing in the Last Year: No       Current Facility-Administered Medications on File Prior to Encounter   Medication Dose Route Frequency Provider Last Rate Last Admin    [COMPLETED] aspirin tablet 325 mg  325 mg Oral ED 1 Time Sergei Rankin MD   325 mg at 08/18/23 0512    [COMPLETED] heparin 25,000 units in dextrose 5% (100 units/ml) IV bolus from bag INITIAL BOLUS (max bolus 4000 units)  60 Units/kg (Adjusted) Intravenous Once Sergei Rankin MD   3,560 Units at 08/18/23 0532    HYDROmorphone (PF) injection 0.5 mg  0.5 mg Intravenous Q10 Min PRN Tha Ram MD        [COMPLETED] morphine injection 4 mg  4 mg Intravenous ED 1 Time Papito Glaser DO   4 mg at 08/18/23 0738    [COMPLETED] nitroGLYCERIN SL tablet 0.4 mg  0.4 mg Sublingual ED 1 Time Sergei Rankin MD   0.4 mg at 08/18/23 0519    [COMPLETED] nitroGLYCERIN SL tablet 0.4 mg  0.4 mg Sublingual ED 1 Time Sergei Rankin MD   0.4 mg at 08/18/23 0603    [COMPLETED] nitroGLYCERIN SL tablet 0.4 mg  0.4 mg Sublingual ED 1 Time Papito Glaser DO   0.4 mg at 08/18/23 0732    [COMPLETED] nitroGLYCERIN SL tablet 0.4 mg  0.4 mg Sublingual ED 1 Time Papito Glaser DO   0.4 mg at 08/18/23 1428    ondansetron injection 4 mg  4 mg Intravenous Once PRN Tha Ram MD        oxyCODONE immediate release tablet 5 mg  5 mg Oral Q3H PRN Tha Ram MD        [COMPLETED] pantoprazole injection 80 mg  80 mg Intravenous ED 1 Time Papito Glaser DO   80 mg at 08/18/23 1019    [COMPLETED] ticagrelor tablet 180 mg  180 mg Oral Once Sergei Rankin MD   180 mg at 08/18/23 0518    [DISCONTINUED] 0.9%  NaCl infusion (for blood administration)   Intravenous Q24H PRN Papito Glaser DO        [DISCONTINUED] heparin (porcine) 5,000 unit/mL injection             [DISCONTINUED] heparin 25,000 units in dextrose 5% (100 units/ml) IV bolus from bag - ADDITIONAL PRN BOLUS - 30 units/kg (max bolus 4000 units)  12 Units/kg (Adjusted) Intravenous  PRN Sergei Rankin MD        [DISCONTINUED] heparin 25,000 units in dextrose 5% (100 units/ml) IV bolus from bag - ADDITIONAL PRN BOLUS - 30 units/kg (max bolus 4000 units)  30 Units/kg (Adjusted) Intravenous PRN Papito Glaser DO        [DISCONTINUED] heparin 25,000 units in dextrose 5% (100 units/ml) IV bolus from bag - ADDITIONAL PRN BOLUS - 60 units/kg (max bolus 4000 units)  12 Units/kg (Adjusted) Intravenous PRN Sergei Rankin MD        [DISCONTINUED] heparin 25,000 units in dextrose 5% (100 units/ml) IV bolus from bag - ADDITIONAL PRN BOLUS - 60 units/kg (max bolus 4000 units)  60 Units/kg (Adjusted) Intravenous PRPapito Esteban DO        [DISCONTINUED] heparin 25,000 units in dextrose 5% 250 mL (100 units/mL) infusion LOW INTENSITY nomogram - [facility]  12 Units/kg/hr (Adjusted) Intravenous Continuous Sergei Rankin MD 7.1 mL/hr at 08/18/23 0531 12 Units/kg/hr at 08/18/23 0531    [DISCONTINUED] heparin 25,000 units in dextrose 5% 250 mL (100 units/mL) infusion LOW INTENSITY nomogram - [facility]  0-40 Units/kg/hr (Adjusted) Intravenous Continuous Papito Glaser DO        [DISCONTINUED] nitroGLYCERIN (NITROSTAT) 0.4 MG SL tablet             [DISCONTINUED] NORepinephrine bitartrate-D5W 4 mg/250 mL (16 mcg/mL) infusion Soln              Current Outpatient Medications on File Prior to Encounter   Medication Sig Dispense Refill    apixaban (ELIQUIS) 2.5 mg Tab Take 1 tablet (2.5 mg total) by mouth 2 (two) times daily. 60 tablet 11    b complex vitamins tablet Take 1 tablet by mouth once daily.      blood sugar diagnostic Strp To check BG 2 times daily, to use with insurance preferred meter True Metric 100 each 0    blood-glucose meter kit To check BG 2 times daily, to use with insurance preferred meter True Metric 1 each 0    calcium acetate,phosphat bind, (PHOSLO) 667 mg capsule TAKE ONE CAPSULE BY MOUTH THREE TIMES DAILY WITH MEALS      carvediloL (COREG) 12.5 MG tablet Take 2 tablets (25 mg total) by  mouth 2 (two) times daily. 180 tablet 3    cetyl,stear.alcoh-prop gly-sls (CETAPHIL) Crea Apply topically.      dextrose 5 % (D5W) SolP 100 mL with penicillin G potassium 5 million unit SolR 8 Million Units Inject 8 Million Units into the vein continuous. for 12 days 12 Dose 0    dorzolamide-timolol 2-0.5% (COSOPT) 22.3-6.8 mg/mL ophthalmic solution Place 1 drop into both eyes 2 (two) times daily. 10 mL 11    fluticasone propionate (FLONASE) 50 mcg/actuation nasal spray SHAKE LIQUID AND USE 1 SPRAY(50 MCG) IN EACH NOSTRIL EVERY DAY 16 g 6    furosemide (LASIX) 40 MG tablet Take 1 tablet (40 mg total) by mouth 2 (two) times daily. Take on non dialysis days 60 tablet 11    lancets Misc To check BG 2 times daily, to use with insurance preferred meter True Metric 100 each 0    lidocaine-prilocaine (EMLA) cream APPLY SMALL AMOUNT TO ACCESS SITE 1-2 HOURS BEFORE TREATMENT. COVER AREA WITH AN OCCLUSIVE DRESSING AS DIRECTED.  99    multivit-min/FA/lycopen/lutein (CENTRUM SILVER MEN ORAL) Take by mouth.         Review of patient's allergies indicates:   Allergen Reactions    Benazepril Swelling    Diphenhydramine hcl      Other reaction(s): Unknown    Gabapentin Other (See Comments)     Double vision and headaches       Review of Systems   Constitutional:  Negative for chills and fever.   Respiratory:  Negative for cough and shortness of breath.    Cardiovascular:  Negative for chest pain and palpitations.   Gastrointestinal:  Positive for blood in stool and melena. Negative for abdominal pain, constipation, diarrhea, nausea and vomiting.   Neurological:  Negative for dizziness and headaches.        Objective:     Vitals:    08/18/23 1947   BP: (!) 146/58   Pulse: 102   Resp: 20   Temp:        Constitutional:  not in acute distress  HENT: Head: Normal, normocephalic, atraumatic.  Eyes: conjunctiva clear and sclera nonicteric  Cardiovascular: regular rate and rhythm  Respiratory: normal chest expansion & respiratory effort    and no accessory muscle use  GI: soft, non-tender, without masses or organomegaly  Musculoskeletal: no muscular tenderness noted  Skin: normal color  Neurological: alert, oriented x3  Psychiatric: mood and affect are within normal limits  Rectal: Melenic stool on pad, exam completed with nursing chaperone    Significant Labs:  Recent Labs   Lab 08/18/23  0517 08/18/23  2145   HGB 6.1*  6.1* 7.6*       Lab Results   Component Value Date    WBC 20.83 (H) 08/18/2023    HGB 7.6 (L) 08/18/2023    HCT 23.8 (L) 08/18/2023    MCV 92 08/18/2023     08/18/2023       Lab Results   Component Value Date     08/18/2023    K 4.8 08/18/2023    CL 92 (L) 08/18/2023    CO2 28 08/18/2023    BUN 72 (H) 08/18/2023    CREATININE 8.7 (H) 08/18/2023    CALCIUM 9.7 08/18/2023    ANIONGAP 18 (H) 08/18/2023    ESTGFRAFRICA 5.9 (A) 05/06/2021    EGFRNONAA 5.1 (A) 05/06/2021       Lab Results   Component Value Date    ALT 9 (L) 08/18/2023    AST 15 08/18/2023    ALKPHOS 41 (L) 08/18/2023    BILITOT 0.5 08/18/2023       Lab Results   Component Value Date    INR 1.0 08/18/2023    INR 1.0 08/18/2023    INR 1.0 08/08/2023       Significant Imaging:  Reviewed pertinent radiology findings.       Assessment/Plan:     Margaret Hernández Jr. is a 83 y.o. male with history of AoCD, ESRD on HD, CHFrEF (EF 30%), Aflutter on Eliquis, s/p DCCV, Neurosyphilis currently on treatment (through 8/22), HLD, HTN and history of recent admission to [facility] for presumed neurosyphilis that re-presented to [facility] on 8/18 with chest pain. Initially with concerns for STEMI, transferred to [facility] and determined to have demand ischemia with Hgb of 6.1. S/p 2 units of PRBC's. Then transferred to [facility] for workup of symptomatic anemia with concern for occult GI bleed.     Problem List:  Melena   Acute on Chronic Anemia- mixed Fe Deficiency and Anemia of Chronic Disease   ESRD    Recommendations:  - Will plan for EGD/colonoscopy on 8/21   - Diet clears for now, then  NPO at midnight on 8/21   - Trend Hgb q8 hrs and transfuse for goal Hgb > 7, unless otherwise indicated  - Maintain IV access with 2 large bore IV's  - Intravascular resuscitation/support with IVFs   - Hold all NSAIDs and anticoagulants, unless contraindicated; ideally would hold Eliquis for 48 hours prior to endoscopy   - Bolus IV pantoprazole 80 mg followed by 40 mg BID  - Please correct any coagulopathy with platelets and FFP for goal of platelets >50K and INR <2.0  - Please notify GI team if there is significant change in patient's clinical status    Thank you for involving us in the care of Margaret Hernández . Please call with any additional questions, concerns or changes in the patient's clinical status. We will continue to follow.     Trell Bui DO  Gastroenterology Fellow PGY -IV  [facility]

## 2023-08-19 NOTE — ASSESSMENT & PLAN NOTE
Patient has known LVEF 30-35% from TTE 03/2023 and WERNER 04/2023. Etiology may be ischemic cardiomyopathy but we don't have any coronary angiogram or stress test in our system to prove otherwise. Patient will benefit from an ischemic workup to evaluate the etiology of his cardiomyopathy.     - Will follow repeat TTE from 08/19/2023  - If LVEF remains < 40%, patient would benefit from optimizing HFrEF GDMT       - Already on carvedilol, can continue      - Consider starting ACE/ARB/ARNI      - MRA would not be indicated considering ESRD status      - SGLT2-I not indicated with current available data considering ESRD status  - Patient should follow up with EP outpatient to discuss installation of an ICD if ischemic cardiomyopathy; if non-ischemic, data is controversial

## 2023-08-20 LAB
ANION GAP SERPL CALC-SCNC: 10 MMOL/L (ref 8–16)
ANION GAP SERPL CALC-SCNC: 15 MMOL/L (ref 8–16)
BASOPHILS # BLD AUTO: 0.03 K/UL (ref 0–0.2)
BASOPHILS # BLD AUTO: 0.04 K/UL (ref 0–0.2)
BASOPHILS NFR BLD: 0.2 % (ref 0–1.9)
BASOPHILS NFR BLD: 0.3 % (ref 0–1.9)
BLD PROD TYP BPU: NORMAL
BLOOD UNIT EXPIRATION DATE: NORMAL
BLOOD UNIT TYPE CODE: 6200
BLOOD UNIT TYPE: NORMAL
BUN SERPL-MCNC: 74 MG/DL (ref 8–23)
BUN SERPL-MCNC: 77 MG/DL (ref 8–23)
CALCIUM SERPL-MCNC: 9 MG/DL (ref 8.7–10.5)
CALCIUM SERPL-MCNC: 9.1 MG/DL (ref 8.7–10.5)
CHLORIDE SERPL-SCNC: 101 MMOL/L (ref 95–110)
CHLORIDE SERPL-SCNC: 99 MMOL/L (ref 95–110)
CO2 SERPL-SCNC: 21 MMOL/L (ref 23–29)
CO2 SERPL-SCNC: 24 MMOL/L (ref 23–29)
CODING SYSTEM: NORMAL
CREAT SERPL-MCNC: 6.5 MG/DL (ref 0.5–1.4)
CREAT SERPL-MCNC: 7.4 MG/DL (ref 0.5–1.4)
CROSSMATCH INTERPRETATION: NORMAL
DIFFERENTIAL METHOD: ABNORMAL
DISPENSE STATUS: NORMAL
EOSINOPHIL # BLD AUTO: 0.1 K/UL (ref 0–0.5)
EOSINOPHIL # BLD AUTO: 0.3 K/UL (ref 0–0.5)
EOSINOPHIL NFR BLD: 1.2 % (ref 0–8)
EOSINOPHIL NFR BLD: 1.6 % (ref 0–8)
EOSINOPHIL NFR BLD: 2.4 % (ref 0–8)
EOSINOPHIL NFR BLD: 2.4 % (ref 0–8)
ERYTHROCYTE [DISTWIDTH] IN BLOOD BY AUTOMATED COUNT: 17.6 % (ref 11.5–14.5)
ERYTHROCYTE [DISTWIDTH] IN BLOOD BY AUTOMATED COUNT: 17.7 % (ref 11.5–14.5)
ERYTHROCYTE [DISTWIDTH] IN BLOOD BY AUTOMATED COUNT: 17.7 % (ref 11.5–14.5)
ERYTHROCYTE [DISTWIDTH] IN BLOOD BY AUTOMATED COUNT: 18.6 % (ref 11.5–14.5)
EST. GFR  (NO RACE VARIABLE): 6.7 ML/MIN/1.73 M^2
EST. GFR  (NO RACE VARIABLE): 7.8 ML/MIN/1.73 M^2
GLUCOSE SERPL-MCNC: 113 MG/DL (ref 70–110)
GLUCOSE SERPL-MCNC: 162 MG/DL (ref 70–110)
HCT VFR BLD AUTO: 20 % (ref 40–54)
HCT VFR BLD AUTO: 21.9 % (ref 40–54)
HCT VFR BLD AUTO: 25.1 % (ref 40–54)
HCT VFR BLD AUTO: 25.1 % (ref 40–54)
HGB BLD-MCNC: 6.4 G/DL (ref 14–18)
HGB BLD-MCNC: 7.2 G/DL (ref 14–18)
HGB BLD-MCNC: 8.2 G/DL (ref 14–18)
HGB BLD-MCNC: 8.2 G/DL (ref 14–18)
IMM GRANULOCYTES # BLD AUTO: 0.06 K/UL (ref 0–0.04)
IMM GRANULOCYTES # BLD AUTO: 0.06 K/UL (ref 0–0.04)
IMM GRANULOCYTES # BLD AUTO: 0.07 K/UL (ref 0–0.04)
IMM GRANULOCYTES # BLD AUTO: 0.19 K/UL (ref 0–0.04)
IMM GRANULOCYTES NFR BLD AUTO: 0.4 % (ref 0–0.5)
IMM GRANULOCYTES NFR BLD AUTO: 0.4 % (ref 0–0.5)
IMM GRANULOCYTES NFR BLD AUTO: 0.6 % (ref 0–0.5)
IMM GRANULOCYTES NFR BLD AUTO: 1.1 % (ref 0–0.5)
LYMPHOCYTES # BLD AUTO: 1.2 K/UL (ref 1–4.8)
LYMPHOCYTES # BLD AUTO: 1.3 K/UL (ref 1–4.8)
LYMPHOCYTES NFR BLD: 11 % (ref 18–48)
LYMPHOCYTES NFR BLD: 7 % (ref 18–48)
LYMPHOCYTES NFR BLD: 8.7 % (ref 18–48)
LYMPHOCYTES NFR BLD: 8.7 % (ref 18–48)
MCH RBC QN AUTO: 29.4 PG (ref 27–31)
MCH RBC QN AUTO: 29.6 PG (ref 27–31)
MCH RBC QN AUTO: 29.6 PG (ref 27–31)
MCH RBC QN AUTO: 30.3 PG (ref 27–31)
MCHC RBC AUTO-ENTMCNC: 32 G/DL (ref 32–36)
MCHC RBC AUTO-ENTMCNC: 32.7 G/DL (ref 32–36)
MCHC RBC AUTO-ENTMCNC: 32.7 G/DL (ref 32–36)
MCHC RBC AUTO-ENTMCNC: 32.9 G/DL (ref 32–36)
MCV RBC AUTO: 91 FL (ref 82–98)
MCV RBC AUTO: 91 FL (ref 82–98)
MCV RBC AUTO: 92 FL (ref 82–98)
MCV RBC AUTO: 92 FL (ref 82–98)
MONOCYTES # BLD AUTO: 1.6 K/UL (ref 0.3–1)
MONOCYTES # BLD AUTO: 1.7 K/UL (ref 0.3–1)
MONOCYTES # BLD AUTO: 1.7 K/UL (ref 0.3–1)
MONOCYTES # BLD AUTO: 1.9 K/UL (ref 0.3–1)
MONOCYTES NFR BLD: 11 % (ref 4–15)
MONOCYTES NFR BLD: 12.1 % (ref 4–15)
MONOCYTES NFR BLD: 12.1 % (ref 4–15)
MONOCYTES NFR BLD: 14 % (ref 4–15)
NEUTROPHILS # BLD AUTO: 10.6 K/UL (ref 1.8–7.7)
NEUTROPHILS # BLD AUTO: 10.6 K/UL (ref 1.8–7.7)
NEUTROPHILS # BLD AUTO: 13.6 K/UL (ref 1.8–7.7)
NEUTROPHILS # BLD AUTO: 8.6 K/UL (ref 1.8–7.7)
NEUTROPHILS NFR BLD: 72.9 % (ref 38–73)
NEUTROPHILS NFR BLD: 76.2 % (ref 38–73)
NEUTROPHILS NFR BLD: 76.2 % (ref 38–73)
NEUTROPHILS NFR BLD: 79.1 % (ref 38–73)
NRBC BLD-RTO: 0 /100 WBC
PLATELET # BLD AUTO: 148 K/UL (ref 150–450)
PLATELET # BLD AUTO: 154 K/UL (ref 150–450)
PLATELET # BLD AUTO: 190 K/UL (ref 150–450)
PLATELET # BLD AUTO: 190 K/UL (ref 150–450)
PMV BLD AUTO: 11 FL (ref 9.2–12.9)
PMV BLD AUTO: 11.1 FL (ref 9.2–12.9)
PMV BLD AUTO: 11.1 FL (ref 9.2–12.9)
PMV BLD AUTO: 11.3 FL (ref 9.2–12.9)
POCT GLUCOSE: 148 MG/DL (ref 70–110)
POTASSIUM SERPL-SCNC: 5 MMOL/L (ref 3.5–5.1)
POTASSIUM SERPL-SCNC: 5.6 MMOL/L (ref 3.5–5.1)
RBC # BLD AUTO: 2.18 M/UL (ref 4.6–6.2)
RBC # BLD AUTO: 2.38 M/UL (ref 4.6–6.2)
RBC # BLD AUTO: 2.77 M/UL (ref 4.6–6.2)
RBC # BLD AUTO: 2.77 M/UL (ref 4.6–6.2)
SODIUM SERPL-SCNC: 135 MMOL/L (ref 136–145)
SODIUM SERPL-SCNC: 135 MMOL/L (ref 136–145)
TRANS ERYTHROCYTES VOL PATIENT: NORMAL ML
WBC # BLD AUTO: 11.72 K/UL (ref 3.9–12.7)
WBC # BLD AUTO: 13.95 K/UL (ref 3.9–12.7)
WBC # BLD AUTO: 13.95 K/UL (ref 3.9–12.7)
WBC # BLD AUTO: 17.16 K/UL (ref 3.9–12.7)

## 2023-08-20 PROCEDURE — 99232 PR SUBSEQUENT HOSPITAL CARE,LEVL II: ICD-10-PCS | Mod: GC,,, | Performed by: INTERNAL MEDICINE

## 2023-08-20 PROCEDURE — 99233 PR SUBSEQUENT HOSPITAL CARE,LEVL III: ICD-10-PCS | Mod: ,,, | Performed by: STUDENT IN AN ORGANIZED HEALTH CARE EDUCATION/TRAINING PROGRAM

## 2023-08-20 PROCEDURE — 20600001 HC STEP DOWN PRIVATE ROOM

## 2023-08-20 PROCEDURE — C9113 INJ PANTOPRAZOLE SODIUM, VIA: HCPCS | Performed by: HOSPITALIST

## 2023-08-20 PROCEDURE — 97535 SELF CARE MNGMENT TRAINING: CPT

## 2023-08-20 PROCEDURE — P9021 RED BLOOD CELLS UNIT: HCPCS | Performed by: STUDENT IN AN ORGANIZED HEALTH CARE EDUCATION/TRAINING PROGRAM

## 2023-08-20 PROCEDURE — 99233 SBSQ HOSP IP/OBS HIGH 50: CPT | Mod: ,,, | Performed by: STUDENT IN AN ORGANIZED HEALTH CARE EDUCATION/TRAINING PROGRAM

## 2023-08-20 PROCEDURE — 93010 EKG 12-LEAD: ICD-10-PCS | Mod: ,,, | Performed by: INTERNAL MEDICINE

## 2023-08-20 PROCEDURE — 97165 OT EVAL LOW COMPLEX 30 MIN: CPT

## 2023-08-20 PROCEDURE — 63600175 PHARM REV CODE 636 W HCPCS: Performed by: STUDENT IN AN ORGANIZED HEALTH CARE EDUCATION/TRAINING PROGRAM

## 2023-08-20 PROCEDURE — 80048 BASIC METABOLIC PNL TOTAL CA: CPT | Performed by: STUDENT IN AN ORGANIZED HEALTH CARE EDUCATION/TRAINING PROGRAM

## 2023-08-20 PROCEDURE — 25000242 PHARM REV CODE 250 ALT 637 W/ HCPCS: Performed by: HOSPITALIST

## 2023-08-20 PROCEDURE — 93010 ELECTROCARDIOGRAM REPORT: CPT | Mod: ,,, | Performed by: INTERNAL MEDICINE

## 2023-08-20 PROCEDURE — 25000003 PHARM REV CODE 250: Performed by: STUDENT IN AN ORGANIZED HEALTH CARE EDUCATION/TRAINING PROGRAM

## 2023-08-20 PROCEDURE — 85025 COMPLETE CBC W/AUTO DIFF WBC: CPT | Performed by: STUDENT IN AN ORGANIZED HEALTH CARE EDUCATION/TRAINING PROGRAM

## 2023-08-20 PROCEDURE — 99232 SBSQ HOSP IP/OBS MODERATE 35: CPT | Mod: GC,,, | Performed by: INTERNAL MEDICINE

## 2023-08-20 PROCEDURE — 63600175 PHARM REV CODE 636 W HCPCS: Performed by: HOSPITALIST

## 2023-08-20 PROCEDURE — 25000003 PHARM REV CODE 250: Performed by: HOSPITALIST

## 2023-08-20 PROCEDURE — 97116 GAIT TRAINING THERAPY: CPT

## 2023-08-20 PROCEDURE — 36430 TRANSFUSION BLD/BLD COMPNT: CPT

## 2023-08-20 PROCEDURE — 97161 PT EVAL LOW COMPLEX 20 MIN: CPT

## 2023-08-20 PROCEDURE — 93005 ELECTROCARDIOGRAM TRACING: CPT

## 2023-08-20 RX ORDER — CARVEDILOL 12.5 MG/1
12.5 TABLET ORAL 2 TIMES DAILY
Status: DISCONTINUED | OUTPATIENT
Start: 2023-08-20 | End: 2023-08-21

## 2023-08-20 RX ORDER — CARVEDILOL 3.12 MG/1
3.12 TABLET ORAL 2 TIMES DAILY
Status: DISCONTINUED | OUTPATIENT
Start: 2023-08-20 | End: 2023-08-20

## 2023-08-20 RX ORDER — SODIUM CHLORIDE 9 MG/ML
INJECTION, SOLUTION INTRAVENOUS ONCE
Status: DISCONTINUED | OUTPATIENT
Start: 2023-08-21 | End: 2023-08-21

## 2023-08-20 RX ORDER — CARVEDILOL 6.25 MG/1
6.25 TABLET ORAL ONCE
Status: COMPLETED | OUTPATIENT
Start: 2023-08-20 | End: 2023-08-20

## 2023-08-20 RX ORDER — HYDROCODONE BITARTRATE AND ACETAMINOPHEN 500; 5 MG/1; MG/1
TABLET ORAL
Status: DISCONTINUED | OUTPATIENT
Start: 2023-08-20 | End: 2023-08-22 | Stop reason: HOSPADM

## 2023-08-20 RX ORDER — FUROSEMIDE 10 MG/ML
120 INJECTION INTRAMUSCULAR; INTRAVENOUS ONCE
Status: COMPLETED | OUTPATIENT
Start: 2023-08-20 | End: 2023-08-20

## 2023-08-20 RX ADMIN — TIMOLOL MALEATE 1 DROP: 5 SOLUTION/ DROPS OPHTHALMIC at 10:08

## 2023-08-20 RX ADMIN — CARVEDILOL 3.12 MG: 3.12 TABLET, FILM COATED ORAL at 09:08

## 2023-08-20 RX ADMIN — DORZOLAMIDE 1 DROP: 20 SOLUTION/ DROPS OPHTHALMIC at 10:08

## 2023-08-20 RX ADMIN — CARVEDILOL 12.5 MG: 12.5 TABLET, FILM COATED ORAL at 10:08

## 2023-08-20 RX ADMIN — POLYETHYLENE GLYCOL 3350, SODIUM SULFATE ANHYDROUS, SODIUM BICARBONATE, SODIUM CHLORIDE, POTASSIUM CHLORIDE 4000 ML: 236; 22.74; 6.74; 5.86; 2.97 POWDER, FOR SOLUTION ORAL at 06:08

## 2023-08-20 RX ADMIN — GUAIFENESIN AND DEXTROMETHORPHAN HYDROBROMIDE 1 TABLET: 600; 30 TABLET, EXTENDED RELEASE ORAL at 09:08

## 2023-08-20 RX ADMIN — SODIUM ZIRCONIUM CYCLOSILICATE 10 G: 10 POWDER, FOR SUSPENSION ORAL at 10:08

## 2023-08-20 RX ADMIN — CALCIUM ACETATE 667 MG: 667 CAPSULE ORAL at 01:08

## 2023-08-20 RX ADMIN — CARVEDILOL 6.25 MG: 6.25 TABLET, FILM COATED ORAL at 02:08

## 2023-08-20 RX ADMIN — NEPHROCAP 1 CAPSULE: 1 CAP ORAL at 09:08

## 2023-08-20 RX ADMIN — DORZOLAMIDE 1 DROP: 20 SOLUTION/ DROPS OPHTHALMIC at 09:08

## 2023-08-20 RX ADMIN — ATORVASTATIN CALCIUM 40 MG: 40 TABLET, FILM COATED ORAL at 10:08

## 2023-08-20 RX ADMIN — PANTOPRAZOLE SODIUM 40 MG: 40 INJECTION, POWDER, FOR SOLUTION INTRAVENOUS at 10:08

## 2023-08-20 RX ADMIN — FUROSEMIDE 120 MG: 10 INJECTION, SOLUTION INTRAMUSCULAR; INTRAVENOUS at 10:08

## 2023-08-20 RX ADMIN — SODIUM ZIRCONIUM CYCLOSILICATE 5 G: 5 POWDER, FOR SUSPENSION ORAL at 10:08

## 2023-08-20 RX ADMIN — CALCIUM ACETATE 667 MG: 667 CAPSULE ORAL at 09:08

## 2023-08-20 RX ADMIN — GUAIFENESIN AND DEXTROMETHORPHAN HYDROBROMIDE 1 TABLET: 600; 30 TABLET, EXTENDED RELEASE ORAL at 10:08

## 2023-08-20 RX ADMIN — DEXTROSE MONOHYDRATE 10 MILLION UNITS: 5 INJECTION, SOLUTION INTRAVENOUS at 05:08

## 2023-08-20 RX ADMIN — CALCIUM ACETATE 667 MG: 667 CAPSULE ORAL at 05:08

## 2023-08-20 RX ADMIN — TIMOLOL MALEATE 1 DROP: 5 SOLUTION/ DROPS OPHTHALMIC at 09:08

## 2023-08-20 RX ADMIN — PANTOPRAZOLE SODIUM 40 MG: 40 INJECTION, POWDER, FOR SOLUTION INTRAVENOUS at 09:08

## 2023-08-20 NOTE — SUBJECTIVE & OBJECTIVE
Interval History: No acute overnight events. Hgb remains low this AM; primary team transfusing. GI planning for scope on 8/21. Patient remains without cardiac symptoms today.     Review of Systems   Constitutional: Negative for chills, decreased appetite, diaphoresis, fever, malaise/fatigue and weight gain.   HENT:  Negative for congestion and ear discharge.    Eyes:  Negative for blurred vision.   Cardiovascular:  Negative for chest pain, dyspnea on exertion, irregular heartbeat, leg swelling, orthopnea, palpitations and paroxysmal nocturnal dyspnea.   Respiratory:  Negative for cough, shortness of breath, snoring and sputum production.    Skin:  Negative for color change, dry skin and rash.   Musculoskeletal:  Negative for back pain, falls, joint pain and neck pain.   Gastrointestinal:  Negative for bloating, abdominal pain, constipation and diarrhea.   Genitourinary:  Negative for dysuria, flank pain, hematuria and hesitancy.   Neurological:  Negative for focal weakness, headaches, numbness and seizures.   Psychiatric/Behavioral:  Negative for altered mental status, depression and hallucinations.      Objective:     Vital Signs (Most Recent):  Temp: 98 °F (36.7 °C) (08/20/23 1200)  Pulse: 72 (08/20/23 1200)  Resp: 16 (08/20/23 1200)  BP: (!) 137/58 (08/20/23 1200)  SpO2: 97 % (08/20/23 1200) Vital Signs (24h Range):  Temp:  [97.5 °F (36.4 °C)-98.7 °F (37.1 °C)] 98 °F (36.7 °C)  Pulse:  [72-97] 72  Resp:  [16-20] 16  SpO2:  [95 %-100 %] 97 %  BP: (100-167)/(39-63) 137/58     Weight: 59.5 kg (131 lb 2.8 oz)  Body mass index is 20.54 kg/m².     SpO2: 97 %         Intake/Output Summary (Last 24 hours) at 8/20/2023 1235  Last data filed at 8/20/2023 1039  Gross per 24 hour   Intake 540 ml   Output 1600 ml   Net -1060 ml       Lines/Drains/Airways       Peripherally Inserted Central Catheter Line  Duration             PICC Single Lumen 08/08/23 1421 right basilic 11 days              Central Venous Catheter Line   Duration                  Hemodialysis AV Graft Left upper arm -- days              Peripheral Intravenous Line  Duration                  Hemodialysis AV Fistula 03/27/23 1911 Left upper arm 145 days         Peripheral IV - Single Lumen 08/18/23 0905 18 G Anterior;Right Forearm 2 days                       Physical Exam  Constitutional:       General: He is not in acute distress.     Appearance: He is not ill-appearing.   HENT:      Nose: Nose normal.      Mouth/Throat:      Mouth: Mucous membranes are moist.   Eyes:      Pupils: Pupils are equal, round, and reactive to light.   Neck:      Comments: No JVD appreciated   Cardiovascular:      Rate and Rhythm: Normal rate and regular rhythm.      Pulses: Normal pulses.      Heart sounds: Murmur (Grade 2/6 systolic crescendo-decrescendo murmur w/ radiation to carotids) heard.   Pulmonary:      Effort: Pulmonary effort is normal. No respiratory distress.      Breath sounds: No wheezing or rales.   Abdominal:      General: Abdomen is flat. There is no distension.      Palpations: Abdomen is soft.      Tenderness: There is no right CVA tenderness or left CVA tenderness.   Musculoskeletal:         General: No swelling.      Cervical back: Normal range of motion and neck supple. No tenderness.      Right lower leg: No edema.      Left lower leg: No edema.      Comments: LUE AV fistula with good thrill   Skin:     General: Skin is warm.      Coloration: Skin is not pale.      Findings: No rash.   Neurological:      General: No focal deficit present.      Mental Status: He is alert and oriented to person, place, and time.      Motor: No weakness.         Significant Labs: CMP   Recent Labs   Lab 08/18/23  2145 08/19/23  0533 08/19/23  0830 08/19/23  1529 08/20/23  0542     --   --  141 135*   K 5.8*   < > 6.1* 4.5 5.6*   CL 95  --   --  103 101   CO2 26  --   --  22* 24   *  --   --  159* 113*   BUN 85*  --   --  44* 74*   CREATININE 9.5*  --   --  4.8* 6.5*    CALCIUM 9.0  --   --  9.0 9.0   ALBUMIN 2.3*  --   --   --   --    ANIONGAP 18*  --   --  16 10    < > = values in this interval not displayed.    and CBC   Recent Labs   Lab 08/19/23  1213 08/19/23  2253 08/20/23  0542   WBC 22.26* 13.27* 11.72   HGB 8.7* 6.9* 6.4*   HCT 26.2* 21.4* 20.0*    170 154

## 2023-08-20 NOTE — ASSESSMENT & PLAN NOTE
-HD 8/19  -K 5.6 this AM  -EKG, IV lasix, lokelma 10mg; pending ekg consider shift as well  -Repeat BMP   -Tele

## 2023-08-20 NOTE — PLAN OF CARE
Margaret Hernández Jr. is a 83 y.o. male admitted to [facility] on 2023 for Acute blood loss anemia. Margaret Hernández Jr. tolerated evaluation well today. He was resting in bed with no family present upon PT/OT entry to room, agreeable to session. He was actively receiving PRBC's during session today due to low H/H (6.4/20). Despite low H/H he is mostly asymptomatic; no c/o dizziness, feels not up to his usual strength due to poor appetite. He ambulates 100 ft in hallways with stand-by assistance, no AD utilized; able to carry out conversation, no unsteadiness noted. Simulated bathroom toilet transfer and washed hands at sink with supervision, back into bed at end of session. Patient with plans for colonoscopy and EGD on , will follow back-up afterwards to ensure continued safe mobility. Discussed PT role, POC, and goals with patient; verbalized understanding. Margaret Hernández Jr. would benefit from acute PT services to promote mobility during this admission and improve return to PLOF.    Problem: Physical Therapy  Goal: Physical Therapy Goal  Description: Goals to be met by: 9/3/23     Patient will increase functional independence with mobility by performin. Sit to stand transfer with [place] from bedside chair - Not met  2. Gait  x 500 feet with Supervision using No Assistive Device - Not met  3. Ascend/descend 1 flight of stairs with a unilateral Handrail and Stand-by Assistance - Not met  Outcome: Ongoing, Progressing    Fidencio Camacho, PT  2023

## 2023-08-20 NOTE — ASSESSMENT & PLAN NOTE
Etiology of Type 2 MI thought to be secondary to Symptomatic anemia  --Trend Troponin here 2.884->3.79->4.207->3.554  -EKG prn for chest pain  -Continue outpatient Coreg BID  -Echo pending  -Appreciate cardiology lisaal

## 2023-08-20 NOTE — PLAN OF CARE
Problem: Adult Inpatient Plan of Care  Goal: Plan of Care Review  Outcome: Ongoing, Progressing  Goal: Patient-Specific Goal (Individualized)  Outcome: Ongoing, Progressing  Goal: Absence of Hospital-Acquired Illness or Injury  Outcome: Ongoing, Progressing  Goal: Optimal Comfort and Wellbeing  Outcome: Ongoing, Progressing  Goal: Readiness for Transition of Care  Outcome: Ongoing, Progressing     Problem: Diabetes Comorbidity  Goal: Blood Glucose Level Within Targeted Range  Outcome: Ongoing, Progressing     Problem: Infection  Goal: Absence of Infection Signs and Symptoms  Outcome: Ongoing, Progressing     Problem: Fall Injury Risk  Goal: Absence of Fall and Fall-Related Injury  Outcome: Ongoing, Progressing     Problem: Device-Related Complication Risk (Hemodialysis)  Goal: Safe, Effective Therapy Delivery  Outcome: Ongoing, Progressing     Problem: Hemodynamic Instability (Hemodialysis)  Goal: Effective Tissue Perfusion  Outcome: Ongoing, Progressing     Problem: Infection (Hemodialysis)  Goal: Absence of Infection Signs and Symptoms  Outcome: Ongoing, Progressing

## 2023-08-20 NOTE — PLAN OF CARE
[facility] ([place] [address])  Initial Discharge Assessment       Primary Care Provider: Stephanie Hernandez NP    Admission Diagnosis: Symptomatic anemia [D64.9]    Admission Date: 8/18/2023  Expected Discharge Date: 8/22/2023    Transition of Care Barriers: (P) None    Payor: [employer]ICARE / Plan: [employer] / Product Type: Medicare Advantage /     Extended Emergency Contact Information  Primary Emergency Contact: NewbyHarper   [facility]  Home Phone: [phone]  Relation: Relative  Secondary Emergency Contact: Cindy Hernández  Mobile Phone: [phone]  Relation: Daughter    Discharge Plan A: (P) Home, Home with family         [id number] - [place], [place] - [address] AT [facility]  [address]  [place] [address]  Phone: [phone] Fax: [phone]    [id number] - [place], [place] - [address] AT [facility]  [address]  [place] [address]  Phone: [phone] Fax: [phone]      Initial Assessment (most recent)       Adult Discharge Assessment - 08/20/23 1706          Discharge Assessment    Assessment Type Discharge Planning Assessment (P)      Confirmed/corrected address, phone number and insurance Yes (P)      Confirmed Demographics Correct on Facesheet (P)      Source of Information patient (P)      When was your last doctors appointment? -- (P)    a month ago    Does patient/caregiver understand observation status Yes (P)      Communicated GABE with patient/caregiver Yes (P)      People in Home spouse (P)      Facility Arrived From: Home (P)      Do you expect to return to your current living situation? Yes (P)      Do you have help at home or someone to help you manage your care at home? Yes (P)      Who are your caregiver(s) and their phone number(s)? Wife (P)      Prior to hospitilization cognitive status: Alert/Oriented (P)      Current cognitive status: Alert/Oriented (P)       Home Accessibility wheelchair accessible (P)      Home Layout Able to live on 1st floor (P)      Equipment Currently Used at Home none (P)      Readmission within 30 days? Yes (P)      Patient currently being followed by outpatient case management? No (P)      Do you currently have service(s) that help you manage your care at home? No (P)      Do you take prescription medications? Yes (P)      Do you have prescription coverage? Yes (P)      [facility]      Do you have any problems affording any of your prescribed medications? No (P)      Is the patient taking medications as prescribed? yes (P)      Who is going to help you get home at discharge? Self (P)      How do you get to doctors appointments? car, drives self (P)      Are you on dialysis? Yes (P)      Dialysis Name and Scheduled days M/W/F @6am [facility] (P)      Do you take coumadin? No (P)      DME Needed Upon Discharge  none (P)      Discharge Plan discussed with: Patient (P)      Transition of Care Barriers None (P)      Discharge Plan A Home;Home with family (P)         Physical Activity    On average, how many days per week do you engage in moderate to strenuous exercise (like a brisk walk)? 3 days (P)      On average, how many minutes do you engage in exercise at this level? 150+ min (P)         Financial Resource Strain    How hard is it for you to pay for the very basics like food, housing, medical care, and heating? Not hard at all (P)         Housing Stability    In the last 12 months, was there a time when you were not able to pay the mortgage or rent on time? No (P)      In the last 12 months, how many places have you lived? 1 (P)      In the last 12 months, was there a time when you did not have a steady place to sleep or slept in a shelter (including now)? No (P)         Transportation Needs    In the past 12 months, has lack of transportation kept you from medical appointments or from getting medications? No (P)      In the  past 12 months, has lack of transportation kept you from meetings, work, or from getting things needed for daily living? No (P)         Food Insecurity    Within the past 12 months, you worried that your food would run out before you got the money to buy more. Never true (P)      Within the past 12 months, the food you bought just didn't last and you didn't have money to get more. Never true (P)         Stress    Do you feel stress - tense, restless, nervous, or anxious, or unable to sleep at night because your mind is troubled all the time - these days? Not at all (P)         Social Connections    In a typical week, how many times do you talk on the phone with family, friends, or neighbors? More than three times a week (P)      How often do you get together with friends or relatives? More than three times a week (P)      How often do you attend Episcopal or Druze services? More than 4 times per year (P)      Do you belong to any clubs or organizations such as Episcopal groups, unions, fraternal or athletic groups, or school groups? No (P)      How often do you attend meetings of the clubs or organizations you belong to? Never (P)      Are you , , , , never , or living with a partner?  (P)         Alcohol Use    Q1: How often do you have a drink containing alcohol? Never (P)      Q2: How many drinks containing alcohol do you have on a typical day when you are drinking? Patient does not drink (P)      Q3: How often do you have six or more drinks on one occasion? Never (P)         OTHER    Name(s) of People in Home Wife (P)

## 2023-08-20 NOTE — PLAN OF CARE
Evaluation completed, plan of care established.    Problem: Occupational Therapy  Goal: Occupational Therapy Goal  Description: Goals to be met by: 9/20/23     Patient will increase functional independence with ADLs by performing:    UE Dressing with [place].  LE Dressing with [place].  Grooming while standing at sink with [place].  Toileting from toilet with [place] for hygiene and clothing management.   Supine to sit with [place].  Step transfer with [place]  Toilet transfer to toilet with [place].    Outcome: Ongoing, Progressing

## 2023-08-20 NOTE — PLAN OF CARE
Problem: Adult Inpatient Plan of Care  Goal: Plan of Care Review  Outcome: Ongoing, Progressing     Problem: Adult Inpatient Plan of Care  Goal: Patient-Specific Goal (Individualized)  Outcome: Ongoing, Progressing     Problem: Adult Inpatient Plan of Care  Goal: Absence of Hospital-Acquired Illness or Injury  Outcome: Ongoing, Progressing     Problem: Adult Inpatient Plan of Care  Goal: Optimal Comfort and Wellbeing  Outcome: Ongoing, Progressing     Problem: Adult Inpatient Plan of Care  Goal: Readiness for Transition of Care  Outcome: Ongoing, Progressing     Hgb 6.4 this morning. Large episode of melena at start of shift. 1U RBCs transfusion; post-transfusion hbg 7.2. BP elevated after blood transfusion, SBP 170s, one time additional dose of carvedilol given. Patient denies chest pain today. No SOB, on room air satting >96%. Potassium 5.6 at start of shift. Lasix and lokelma packet given. Small amount of urine observed with BM this morning. Clear liquid diet and will be NPO at midnight. Golytely started at 1800. Refusing finger sticks. Plan of care reviewed with patient.

## 2023-08-20 NOTE — HOSPITAL COURSE
Transfer from OSH evaluation in setting of acute blood loss anemia associated with demand ischemia.  Chest pain with resolution following PRBC transfusions.  Troponin with peak.  Per cardio evaluation, echo, stop anti thrombotic and anti-platelet therapy. LDL at target w/o statin therapy. GDMT as tolerated. Echo EF mid 40s (slightly improved from prior); mild global hypokinesis, mod pHTN. Per Cards, Consider starting ACE/ARB/ARNI if heart failure symptoms develop otherwise no strong indication w/ moderately reduced EF. Hgb w/ ongoing downtrend in setting of ongoing melena. Required PRBC transfusions. S/p EGD and C-scope w/ GI 8/21. Notable for diverticulosis in ascending colon, nonbleeding polyps in entire colon and resected.  Eliquis resumed. Melena resolved. Hgb stable.  Discussed with patient's outpatient ID provider at [facility] (Candice Mejia): d/c PCN infusion, and administer Bicillin 2.4 million IM; Pt to follow with her weekly for 4 weeks. Patient without further hospitalization needs.  Patient medically stable for discharge.  Return precautions advised; patient's questions answered.  Amb Cards. GI consented and to arrange for out-patient video capsule. ID arranging weekly follow-up.  Patient in agreement with discharge plan.    Vitals:    08/22/23 1300 08/22/23 1345 08/22/23 1421 08/22/23 1519   BP: (!) 188/73 (!) 147/67     BP Location: Right leg      Patient Position: Lying      Pulse: 82 88  81   Resp: 18      Temp: 98.3 °F (36.8 °C)      TempSrc: Oral      SpO2:  99% 99%    Weight:       Height:         Physical Exam  Vitals and nursing note reviewed.   Constitutional:       Appearance: He is not toxic-appearing or diaphoretic.      Interventions: Nasal cannula in place.      Comments: RUE PICC LINE   HENT:      Head: Normocephalic and atraumatic.      Mouth/Throat:      Mouth: Mucous membranes are moist.      Pharynx: Oropharynx is clear.   Eyes:      General: No scleral icterus.        Right eye: No  discharge.         Left eye: No discharge.   Cardiovascular:      Rate and Rhythm: Normal rate.      Pulses: Normal pulses.      Heart sounds: Murmur heard.      Comments: LUE AVF with thrill/bruit  Pulmonary:      Effort: Pulmonary effort is normal. No respiratory distress.      Breath sounds: Normal breath sounds. No wheezing or rales.   Abdominal:      General: Bowel sounds are normal. There is no distension.      Palpations: Abdomen is soft.      Tenderness: There is no abdominal tenderness. There is no guarding or rebound.   Musculoskeletal:      Cervical back: Neck supple. No tenderness.      Right lower leg: No edema.      Left lower leg: No edema.   Lymphadenopathy:      Cervical: No cervical adenopathy.   Skin:     General: Skin is warm.      Findings: No rash.   Neurological:      General: No focal deficit present.      Mental Status: He is alert and oriented to person, place, and time.   Psychiatric:         Behavior: Behavior normal.

## 2023-08-20 NOTE — PT/OT/SLP EVAL
Physical Therapy  Evaluation and Treatment    Margaret Hernández Jr.   [id number]    Time Tracking:     PT Received On: 08/20/23   PT Start Time: 1201   PT Stop Time: 1217   PT Total Time (min): 16 min    Billable Minutes: Evaluation 8 and Gait Training 8 minutes       Recommendations:     Discharge recommendations:  Other (no post-acute PT needs identified upon D/C)     Equipment recommendations: None    Barriers to Discharge: None    Patient Information:     Recent Surgery: * No surgery found *      Diagnosis: Acute blood loss anemia    Length of Stay: 2 days    General Precautions: Standard, fall  Orthopedic Precautions: None  Brace: None    Assessment:     Margaret Hernández Jr. is a 83 y.o. male admitted to [facility] on 8/18/2023 for Acute blood loss anemia. Margaret Hernández Jr. tolerated evaluation well today. He was resting in bed with no family present upon PT/OT entry to room, agreeable to session. He was actively receiving PRBC's during session today due to low H/H (6.4/20). Despite low H/H he is mostly asymptomatic; no c/o dizziness, feels not up to his usual strength due to poor appetite. He ambulates 100 ft in hallways with stand-by assistance, no AD utilized; able to carry out conversation, no unsteadiness noted. Simulated bathroom toilet transfer and washed hands at sink with supervision, back into bed at end of session. Patient with plans for colonoscopy and EGD on 8/21, will follow back-up afterwards to ensure continued safe mobility. Discussed PT role, POC, and goals with patient; verbalized understanding. Margaret Hernández Jr. would benefit from acute PT services to promote mobility during this admission and improve return to PLOF.    Problem List: weakness, decreased endurance, impaired mobility, and impaired cardiopulmonary response to activity    Rehab Prognosis: Good; patient would benefit from acute skilled PT services to address these deficits and reach maximum level of function.    Plan:     Patient to be seen 3  "x/week to address the above listed problems via gait training, therapeutic activities, therapeutic exercises    Plan of Care Expires: 09/19/23  Plan of Care reviewed with: patient    Subjective:     Communicated with RN prior to evaluation, appropriate to see for evaluation.    Pt found supine in bed (HOB elevated) upon PT entry to room, agreeable to evaluation.    Patient commenting: "I don't have my usual strength because I haven't been eating."    Does this patient have any cultural, spiritual, Zoroastrian conflicts given the current situation? Patient has no barriers to learning. Patient verbalizes understanding of his/her program and goals and demonstrates them correctly. No cultural, spiritual, or educational needs identified.    Past Medical History:   Diagnosis Date    Abdominal hernia     Anemia     Chronic kidney disease     ED (erectile dysfunction)     Glaucoma     Hard of hearing     Hyperlipidemia     Hypertension     Sinus congestion       Past Surgical History:   Procedure Laterality Date    AV FISTULA PLACEMENT Left 04/09/2018    BIOPSY OF TEMPORAL ARTERY Left 03/07/2023    Procedure: BIOPSY, ARTERY, TEMPORAL;  Surgeon: Yovany Sol MD;  Location: [facility];  Service: Cardiovascular;  Laterality: Left;  carotid artery    CATARACT EXTRACTION      FISTULOGRAM Left 01/14/2020    Procedure: Fistulogram;  Surgeon: Milton Renee MD;  Location: [facility] CATH LAB;  Service: Peripheral Vascular;  Laterality: Left;    FISTULOGRAM Left 03/10/2020    Procedure: Fistulogram;  Surgeon: Milton Renee MD;  Location: [facility] CATH LAB;  Service: Peripheral Vascular;  Laterality: Left;    FISTULOGRAM Left 06/16/2020    Procedure: Fistulogram;  Surgeon: Milton Renee MD;  Location: [facility] CATH LAB;  Service: Peripheral Vascular;  Laterality: Left;    FISTULOGRAM Left 02/08/2022    Procedure: Fistulogram;  Surgeon: Milton Renee MD;  Location: [facility] CATH LAB;  Service: Peripheral Vascular;  Laterality: " Left;    HERNIA REPAIR      PERCUTANEOUS TRANSLUMINAL ANGIOPLASTY OF ARTERIOVENOUS FISTULA Left 06/16/2020    Procedure: PTA, AV Graft;  Surgeon: Milton Renee MD;  Location: [facility] CATH LAB;  Service: Peripheral Vascular;  Laterality: Left;    PERIPHERALLY INSERTED CENTRAL CATHETER INSERTION N/A 8/8/2023    Procedure: Insertion-picc;  Surgeon: Martin Alston MD;  Location: [facility] CATH LAB;  Service: Cardiology;  Laterality: N/A;    PERMACATH PLACEMENT Right     PLACEMENT OF ARTERIOVENOUS GRAFT Left 01/27/2020    Procedure: INSERTION, GRAFT, ARTERIOVENOUS;  Surgeon: Milton Renee MD;  Location: [facility] OR;  Service: General;  Laterality: Left;    PROSTATE SURGERY      SKIN BIOPSY      TRANSESOPHAGEAL ECHOCARDIOGRAM WITH POSSIBLE CARDIOVERSION (WERNER W/ POSS CARDIOVERSION) N/A 04/11/2023    Procedure: TRANSESOPHAGEAL ECHOCARDIOGRAM WITH POSSIBLE CARDIOVERSION (WERNER W/ POSS CARDIOVERSION);  Surgeon: Martin Alston MD;  Location: [facility] CATH LAB;  Service: Cardiology;  Laterality: N/A;    ULTRASOUND GUIDANCE  06/16/2020    Procedure: Ultrasound Guidance;  Surgeon: Milton Renee MD;  Location: [facility] CATH LAB;  Service: Peripheral Vascular;;       Living Environment:  Pt lives with his spouse in a 1  with 0 KAT near [place], [place]. He uses a tub/shower with no seat or grab bars established.    PLOF:  Prior to admission, patient was independent with all mobility and self-care. He is very active, works out at [employer] (proudly showing therapist pictures on his phone) and drives daily.    DME:  Patient owns or has access to the following DME: None    Upon discharge, patient will have assistance from spouse.    Objective:     Patient found with: telemetry, pulse ox (continuous), blood pressure cuff, peripheral IV    Pain:  Pain Rating 1: 0/10  Pain Rating Post-Intervention 1: 0/10    Cognitive Exam:  Patient is oriented to Person, Place, Time, and Situation.  Patient follows 100% of single-step  commands.    Sensation:   Intact at BLE to LT    Lower Extremity Range of Motion:  Right Lower Extremity: WFL actively  Left Lower Extremity: WFL actively    Lower Extremity Strength:  Right Lower Extremity: grossly 4/5 via MMT  Left Lower Extremity: grossly 4/5 via MMT    Functional Mobility:    Bed Mobility:  Supine to Sitting: supervision  Sitting to Supine: supervision    Transfers:  Sit to Stand: supervision from EOB with no AD x 2 trial(s)  Toilet Transfer: supervision on/off toilet with no AD x 1 trial (s)    Gait:  100 feet in hallways with stand-by assistance, no AD utilized; able to carry out conversation, no unsteadiness noted  Therapist limited distance today considering he was actively receiving PRBC's via IV, no c/o dizziness    Assist level: Stand-By Assist  Device: no AD    Balance:  Static Sit: Independent at EOB    Static Stand: Supervision with no AD    AM-PAC 6 CLICK MOBILITY  Turning over in bed (including adjusting bedclothes, sheets and blankets)?: 4  Sitting down on and standing up from a chair with arms (e.g., wheelchair, bedside commode, etc.): 4  Moving from lying on back to sitting on the side of the bed?: 4  Moving to and from a bed to a chair (including a wheelchair)?: 4  Need to walk in hospital room?: 4  Climbing 3-5 steps with a railing?: 3  Basic Mobility Total Score: 23    Patient was left supine in bed (HOB elevated) with all lines intact, call button in reach, and RN notified.    Clinical Decision Making for Evaluation Complexity:  1. Body System(s) Examination: 1-2  2. Clinical Presentation: Evolving  3. Evaluation Complexity: Low    GOALS:   Multidisciplinary Problems       Physical Therapy Goals          Problem: Physical Therapy    Goal Priority Disciplines Outcome Goal Variances Interventions   Physical Therapy Goal     PT, PT/OT      Description: Goals to be met by: 9/3/23     Patient will increase functional independence with mobility by performin. Sit to stand  transfer with [place] from bedside chair - Not met  2. Gait  x 500 feet with Supervision using No Assistive Device - Not met  3. Ascend/descend 1 flight of stairs with a unilateral Handrail and Stand-by Assistance - Not met                     Fidencio Camacho, PT  8/20/2023

## 2023-08-20 NOTE — PT/OT/SLP EVAL
"Occupational Therapy   Evaluation    Name: Margaret Hernández Jr.  MRN: [id number]  Admitting Diagnosis: Acute blood loss anemia  Recent Surgery: * No surgery found *      Recommendations:     Discharge Recommendations: other (see comments)  Discharge Equipment Recommendations:  none  Barriers to discharge:  None    Assessment:     Margaret Hernández Jr. is a 83 y.o. male with a medical diagnosis of Acute blood loss anemia.  He presents with the following performance deficits affecting function: impaired functional mobility, impaired self care skills, impaired endurance, weakness, impaired cardiopulmonary response to activity, gait instability.      Rehab Prognosis: Good; patient would benefit from acute skilled OT services to address these deficits and reach maximum level of function.       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  Patient found supine with peripheral IV, telemetry upon OT entry to room.    General Precautions: Standard, fall  Orthopedic Precautions: N/A  Braces: N/A  Respiratory Status: Room air    Occupational Performance:    Bed Mobility:    Patient completed " Scooting/Bridging with supervision  Patient completed Supine to Sit with supervision  Patient completed Sit to Supine with supervision    Functional Mobility/Transfers:  Patient completed Sit <> Stand Transfer with supervision  with  no assistive device   Patient completed Toilet Transfer Step Transfer technique with supervision with  no AD  Functional Mobility: SBA with no AD HH distance x 2 (~100 ft)    Activities of Daily Living:  Grooming: supervision standing at sink in bathroom  Upper Body Dressing: supervision seated edge of bed   Lower Body Dressing: supervision seated edge of bed   Toileting: supervision from toilet in bathroom    Cognitive/Visual Perceptual:  Cognitive/Psychosocial Skills:     -       Oriented to: Person, Place, Time, and Situation   -       Follows Commands/attention:Follows multistep  commands  -       Safety awareness/insight to disability: intact   -       Mood/Affect/Coping skills/emotional control: Appropriate to situation    Physical Exam:  Postural examination/scapula alignment:    -       No postural abnormalities identified  Upper Extremity Range of Motion:     -       Right Upper Extremity: WFL  -       Left Upper Extremity: WFL  Upper Extremity Strength:    -       Right Upper Extremity: WFL  -       Left Upper Extremity: WFL   Strength:    -       Right Upper Extremity: WFL  -       Left Upper Extremity: WFL    AMPAC 6 Click ADL:  AMPAC Total Score: 20    Treatment & Education:  -Evaluation completed, plan of care established.  -Patient and family educated on roles/goals of OT and POC.  -White board updated.  -Therapist provided time for questions and answered within scope of practice.  -Patient educated on importance of EOB/OOB activity to maximize recovery.     Patient left HOB elevated with all lines intact and call button in reach    GOALS:   Multidisciplinary Problems       Occupational Therapy Goals          Problem: Occupational Therapy    Goal Priority Disciplines  Outcome Interventions   Occupational Therapy Goal     OT, PT/OT Ongoing, Progressing    Description: Goals to be met by: 9/20/23     Patient will increase functional independence with ADLs by performing:    UE Dressing with Finney.  LE Dressing with Finney.  Grooming while standing at sink with Finney.  Toileting from toilet with Finney for hygiene and clothing management.   Supine to sit with Finney.  Step transfer with Finney  Toilet transfer to toilet with Finney.                         History:     Past Medical History:   Diagnosis Date    Abdominal hernia     Anemia     Chronic kidney disease     ED (erectile dysfunction)     Glaucoma     Hard of hearing     Hyperlipidemia     Hypertension     Sinus congestion          Past Surgical History:   Procedure Laterality Date    AV FISTULA PLACEMENT Left 04/09/2018    BIOPSY OF TEMPORAL ARTERY Left 03/07/2023    Procedure: BIOPSY, ARTERY, TEMPORAL;  Surgeon: Yovany Sol MD;  [facility];  Service: Cardiovascular;  Laterality: Left;  carotid artery    CATARACT EXTRACTION      FISTULOGRAM Left 01/14/2020    Procedure: Fistulogram;  Surgeon: Milton Renee MD;  Location: [facility] CATH LAB;  Service: Peripheral Vascular;  Laterality: Left;    FISTULOGRAM Left 03/10/2020    Procedure: Fistulogram;  Surgeon: Milton Renee MD;  Location: [facility] CATH LAB;  Service: Peripheral Vascular;  Laterality: Left;    FISTULOGRAM Left 06/16/2020    Procedure: Fistulogram;  Surgeon: Milton Renee MD;  Location: [facility] CATH LAB;  Service: Peripheral Vascular;  Laterality: Left;    FISTULOGRAM Left 02/08/2022    Procedure: Fistulogram;  Surgeon: Milton Renee MD;  Location: [facility] CATH LAB;  Service: Peripheral Vascular;  Laterality: Left;    HERNIA REPAIR      PERCUTANEOUS TRANSLUMINAL ANGIOPLASTY OF ARTERIOVENOUS FISTULA Left 06/16/2020    Procedure: PTA, AV Graft;  Surgeon: Milton Renee MD;  Location: [facility]  CATH LAB;  Service: Peripheral Vascular;  Laterality: Left;    PERIPHERALLY INSERTED CENTRAL CATHETER INSERTION N/A 8/8/2023    Procedure: Insertion-picc;  Surgeon: Martin Alston MD;  Location: [facility] CATH LAB;  Service: Cardiology;  Laterality: N/A;    PERMACATH PLACEMENT Right     PLACEMENT OF ARTERIOVENOUS GRAFT Left 01/27/2020    Procedure: INSERTION, GRAFT, ARTERIOVENOUS;  Surgeon: Milton Renee MD;  Location: [facility] OR;  Service: General;  Laterality: Left;    PROSTATE SURGERY      SKIN BIOPSY      TRANSESOPHAGEAL ECHOCARDIOGRAM WITH POSSIBLE CARDIOVERSION (WERNER W/ POSS CARDIOVERSION) N/A 04/11/2023    Procedure: TRANSESOPHAGEAL ECHOCARDIOGRAM WITH POSSIBLE CARDIOVERSION (WERNER W/ POSS CARDIOVERSION);  Surgeon: Martin Alston MD;  Location: [facility] CATH LAB;  Service: Cardiology;  Laterality: N/A;    ULTRASOUND GUIDANCE  06/16/2020    Procedure: Ultrasound Guidance;  Surgeon: Milton Renee MD;  Location: [facility] CATH LAB;  Service: Peripheral Vascular;;       Time Tracking:     OT Date of Treatment: 08/20/23  OT Start Time: 1201  OT Stop Time: 1220  OT Total Time (min): 19 min    Billable Minutes:Evaluation 9  Self Care/Home Management 10    8/20/2023

## 2023-08-20 NOTE — PROGRESS NOTES
[facility] - Int[facility] Care ([place] [id number])  Cardiology  Progress Note    Patient Name: Margaret Hernández Jr.  MRN: [id number]  Admission Date: 8/18/2023  Hospital Length of Stay: 2 days  Code Status: Full Code   Attending Physician: Marsha Dixon MD   Primary Care Physician: Stephanie Hernandez NP  Expected Discharge Date: 8/22/2023  Principal Problem:Acute blood loss anemia    Subjective:     Interval History: No acute overnight events. Hgb remains low this AM; primary team transfusing. GI planning for scope on 8/21. Patient remains without cardiac symptoms today.     Review of Systems   Constitutional: Negative for chills, decreased appetite, diaphoresis, fever, malaise/fatigue and weight gain.   HENT:  Negative for congestion and ear discharge.    Eyes:  Negative for blurred vision.   Cardiovascular:  Negative for chest pain, dyspnea on exertion, irregular heartbeat, leg swelling, orthopnea, palpitations and paroxysmal nocturnal dyspnea.   Respiratory:  Negative for cough, shortness of breath, snoring and sputum production.    Skin:  Negative for color change, dry skin and rash.   Musculoskeletal:  Negative for back pain, falls, joint pain and neck pain.   Gastrointestinal:  Negative for bloating, abdominal pain, constipation and diarrhea.   Genitourinary:  Negative for dysuria, flank pain, hematuria and hesitancy.   Neurological:  Negative for focal weakness, headaches, numbness and seizures.   Psychiatric/Behavioral:  Negative for altered mental status, depression and hallucinations.      Objective:     Vital Signs (Most Recent):  Temp: 98 °F (36.7 °C) (08/20/23 1200)  Pulse: 72 (08/20/23 1200)  Resp: 16 (08/20/23 1200)  BP: (!) 137/58 (08/20/23 1200)  SpO2: 97 % (08/20/23 1200) Vital Signs (24h Range):  Temp:  [97.5 °F (36.4 °C)-98.7 °F (37.1 °C)] 98 °F (36.7 °C)  Pulse:  [72-97] 72  Resp:  [16-20] 16  SpO2:  [95 %-100 %] 97 %  BP: (100-167)/(39-63) 137/58     Weight: 59.5 kg (131 lb 2.8 oz)  Body mass index is 20.54  kg/m².     SpO2: 97 %         Intake/Output Summary (Last 24 hours) at 8/20/2023 1235  Last data filed at 8/20/2023 1039  Gross per 24 hour   Intake 540 ml   Output 1600 ml   Net -1060 ml       Lines/Drains/Airways       Peripherally Inserted Central Catheter Line  Duration             PICC Single Lumen 08/08/23 1421 right basilic 11 days              Central Venous Catheter Line  Duration                  Hemodialysis AV Graft Left upper arm -- days              Peripheral Intravenous Line  Duration                  Hemodialysis AV Fistula 03/27/23 1911 Left upper arm 145 days         Peripheral IV - Single Lumen 08/18/23 0905 18 G Anterior;Right Forearm 2 days                       Physical Exam  Constitutional:       General: He is not in acute distress.     Appearance: He is not ill-appearing.   HENT:      Nose: Nose normal.      Mouth/Throat:      Mouth: Mucous membranes are moist.   Eyes:      Pupils: Pupils are equal, round, and reactive to light.   Neck:      Comments: No JVD appreciated   Cardiovascular:      Rate and Rhythm: Normal rate and regular rhythm.      Pulses: Normal pulses.      Heart sounds: Murmur (Grade 2/6 systolic crescendo-decrescendo murmur w/ radiation to carotids) heard.   Pulmonary:      Effort: Pulmonary effort is normal. No respiratory distress.      Breath sounds: No wheezing or rales.   Abdominal:      General: Abdomen is flat. There is no distension.      Palpations: Abdomen is soft.      Tenderness: There is no right CVA tenderness or left CVA tenderness.   Musculoskeletal:         General: No swelling.      Cervical back: Normal range of motion and neck supple. No tenderness.      Right lower leg: No edema.      Left lower leg: No edema.      Comments: LUE AV fistula with good thrill   Skin:     General: Skin is warm.      Coloration: Skin is not pale.      Findings: No rash.   Neurological:      General: No focal deficit present.      Mental Status: He is alert and oriented to  person, place, and time.      Motor: No weakness.         Significant Labs: CMP   Recent Labs   Lab 08/18/23  2145 08/19/23  0533 08/19/23  0830 08/19/23  1529 08/20/23  0542     --   --  141 135*   K 5.8*   < > 6.1* 4.5 5.6*   CL 95  --   --  103 101   CO2 26  --   --  22* 24   *  --   --  159* 113*   BUN 85*  --   --  44* 74*   CREATININE 9.5*  --   --  4.8* 6.5*   CALCIUM 9.0  --   --  9.0 9.0   ALBUMIN 2.3*  --   --   --   --    ANIONGAP 18*  --   --  16 10    < > = values in this interval not displayed.    and CBC   Recent Labs   Lab 08/19/23  1213 08/19/23  2253 08/20/23  0542   WBC 22.26* 13.27* 11.72   HGB 8.7* 6.9* 6.4*   HCT 26.2* 21.4* 20.0*    170 154     Assessment and Plan:     HFrEF (heart failure with reduced ejection fraction)  Patient has known LVEF 30-35% from TTE 03/2023 and WERNER 04/2023. Etiology may be ischemic cardiomyopathy but we don't have any coronary angiogram or stress test in our system to prove otherwise. Patient will benefit from an ischemic workup to evaluate the etiology of his cardiomyopathy after acute bleed is addressed.    - Will follow repeat TTE from 08/20/2023  - If LVEF remains < 40%, patient would benefit from optimizing HFrEF GDMT       - Already on carvedilol, can continue      - Consider starting ACE/ARB/ARNI      - MRA would not be indicated considering ESRD status      - SGLT2-I not indicated with current available data considering ESRD status  - Patient should follow up with EP outpatient to discuss installation of an ICD if ischemic cardiomyopathy; if non-ischemic, data is controversial      Atrial flutter  S/p DCCV 04/2023  AC with Eliquis 2.5 mg PO BID (age, Cr) when stable     Type II NSTEMI (demand ischemia)  Patient is an 83 year old male with anemia, ESRD on HD, HFrEF( most recent EF 30%), atrial flutter on Eliquis status post DCCV April 2023, neurosyphilis, HLD, HTN presented to [facility] on August 18 with chest discomfort. Transferred  to [place] with Cardiology consultation, and then transferred to [facility] for higher level of care. ECG 08/18/2023 at 6 AM independently reviewed, showed diffuse ST depressions in the anterior, inferior, and lateral leads with aVR elevation. ECG 08/19/2023 independently reviewed, showed NST, 1st degree AV block, and complete resolution of ST-T changes. Patient continues to report being angina free. Patient had troponin elevation 0.094 --> 2.88--> 3.79 --> 4.21. While biomarker elevation is concerning for underlying coronary artery disease, this presentation is not consistent with acute plaque rupture. This presentation is likely a Type II MI secondary to demand ischemia from acute blood loss anemia (Hgb 6.1 g/dL at OSH).     - Agree with GI consultation to evaluate etiology of acute blood loss anemia  - Echo was already ordered, will follow  - Recommend continuing anti-anginal therapy with beta - blocker  - Patient is already at target LDL < 70 mg/dL (currently 58 mg/dL) without statin  - Consider isosorbide mononitrate if anginal symptoms worsen and BP tolerates        VTE Risk Mitigation (From admission, onward)         Ordered     heparin (porcine) injection 1,000 Units  As needed (PRN)         08/19/23 0718     Reason for No Pharmacological VTE Prophylaxis  Once        Question:  Reasons:  Answer:  Risk of Bleeding    08/18/23 2104     IP VTE HIGH RISK PATIENT  Once         08/18/23 2104     Place sequential compression device  Until discontinued         08/18/23 2104                Odilon Vazquez MD  Cardiology  [facility] - Intensive Care ([place])

## 2023-08-20 NOTE — PROGRESS NOTES
[facility] - Int[facility] ([address] Medicine  Progress Note    Patient Name: Margaret Hernández Jr.  MRN: [id number]  Patient Class: IP- Inpatient   Admission Date: 8/18/2023  Length of Stay: 2 days  Attending Physician: Marsha Dixon MD  Primary Care Provider: Stephanie Hernandez NP        Subjective:     Principal Problem:Acute blood loss anemia        HPI:  TRANSFER CENTER PHYSICIAN SUMMARY  83-year-old male with a history of anemia, end-stage renal disease on hemodialysis (most recent dialysis on August 16), HFrEF, atrial flutter (on Eliquis) with DCCV, syphilis (treated in 2016), hyperlipidemia, hypertension, glaucoma, and recent admission to [facility] for 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  No diaphoresis, dyspnea, or nausea/vomiting noted.  EKG had ST elevation in AVR with depressions in inferior, anterior, and lateral leads.  Findings were felt to be consistent with STEMI.  He received sublingual nitroglycerin, aspirin, ticagrelor, and heparin bolus/infusion.  He was subsequently transferred to the [facility] ED.     At [facility] - He was seen by Cardiology in the emergency department, and it was noted that his hemoglobin was 6.1.  It was felt that his findings were more likely a type 2 NSTEMI.  Heparin was discontinued, and 2 units packed red blood cells were given.  Troponin levels were 0.064 with repeat 0.094.  Stool for occult blood was positive, and he did report dark stool recently.  He is having intermittent chest pain but appears comfortable. In the emergency department he received sublingual nitroglycerin and IV morphine. With the symptomatic anemia, cardiac issues, and heme positive stool, ED spoke with Cardiology at [facility].  After reviewing the case was felt that urgent cardiac catheterization was not indicated.  Will plan transfer to  "Hospital Medicine at [facility] in step-down status for further treatment of symptomatic anemia/possible GI bleed.     Previous admissions this year at outside hospitals:  -March 1-2 with concern for temporal arteritis.  Treated with steroids with plans for temporal artery biopsy.  He subsequently had outpatient temporal artery biopsy on March 7 that had no significant increase in inflammation.  He was in the process of weaning off steroids.    -March 27-29 with atrial flutter treated medically.  -April 11 for elective cardioversion of atrial flutter.  He had no intracardiac thrombus on WERNER, and he was cardioverted to sinus rhythm.  He was treated with Eliquis.  -April 26-27 with chest pain/NSTEMI due showed FT depression in leads I, V4, V5, and V6 with ST elevation in AVR.  It was noted that these findings were seen on previous EKGs.  Hemoglobin was noted to be 8.3, and he received packed red blood cells with dialysis.  -April 8-10 with concern for neurosyphilis.  Infectious Diseases recommended lumbar puncture and PICC line placement for IV penicillin to treat suspected neurosyphilis.  He had lumbar puncture on August 8.  Recommendation was for 14 days of IV penicillin (end date around August 22).       BEDSIDE EVAL    Patient seen these evening, he is in bed in no distress, reports no acute complaints, reviewed events of today and plan for hospitalization.  He received 2 units PRBC prior to arrival - he is on 2L NC which is new.  At bedside removed Oxygen since patient on continuous bedside monitor initially sats no lower than 95 but patient visibly uncomfortable after oxygen removed, asked to sit up and then be reclined, stated he felt tighntess or "cold" in his throat.   His chest pain he initially presented with is improved but not fully resolved, reports symptom as minor.     He reports seeing dark/black stools in recent days, denies any hematemesis, no abdominal pain, taking eliquis as outpatient.  " Last dose of Eliquis was Wed 8/16    He denies prior hx of MI or any prior stent placements.   Repeat EKG is obtained and his ST depressions have resolved, sTAT labs obtained and Potassium is 5.8,  T-waves more hyperacute than last EKG performed today.  NO St elevations noted.  Patient lives with wife, performs all ADL at home, does not use assistive device, not working currenty.  Denies any Tobacco/ETOH/Drug use.       Overview/Hospital Course:  Transfer from OSH evaluation in setting of acute blood loss anemia associated with demand ischemia.  Chest pain with resolution following PRBC transfusions.  Troponin with peak.  Per cardio evaluation, echo, stop anti thrombotic and anti-platelet therapy.  GDMT as tolerated. Hgb w/ ongoing downtrend in setting of ongoing melena. Required PRBC transfusions. GI w/ plans for scope 8/21.       Interval History:  Patient seen and examined at bedside.    Per overnight, c/o SOB and HA while receiving blood transfusion overnight.  Decrease in transfusion rate; patient denies any shortness of breath, headache, chest pain this morning.  K again elevated- 5.6; Hgb 6.4.   Per nursing, episode of melena this AM.   Patient updated regarding care plan.      Review of Systems   Constitutional:  Positive for activity change, appetite change and fatigue. Negative for chills.   HENT:  Positive for congestion. Negative for sore throat and trouble swallowing.    Respiratory:  Positive for cough. Negative for shortness of breath.    Cardiovascular:  Negative for palpitations and leg swelling.   Gastrointestinal:  Negative for abdominal pain, nausea and vomiting.   Skin:  Negative for rash.   Neurological:  Positive for weakness. Negative for headaches.   Psychiatric/Behavioral: Negative.         Objective:    Temp: 97.9 °F (36.6 °C) (08/20/23 1048)  Pulse: 78 (08/20/23 1059)  Resp: 18 (08/20/23 1048)  BP: (!) 119/48 (08/20/23 1048)  SpO2: 100 % (08/20/23 1048)    Weight: 59.5 kg (131 lb 2.8 oz)  (08/20/23 0500)    Body mass index is 20.54 kg/m².    Physical Exam  Vitals and nursing note reviewed.   Constitutional:       Appearance: He is ill-appearing. He is not toxic-appearing or diaphoretic.      Interventions: Nasal cannula in place.      Comments: RUE PICC LINE   HENT:      Head: Normocephalic and atraumatic.      Mouth/Throat:      Mouth: Mucous membranes are moist.      Pharynx: Oropharynx is clear.   Eyes:      General: No scleral icterus.        Right eye: No discharge.         Left eye: No discharge.   Cardiovascular:      Rate and Rhythm: Normal rate.      Pulses: Normal pulses.      Heart sounds: Murmur heard.      Comments: LUE AVF with thrill/bruit  Pulmonary:      Effort: Pulmonary effort is normal. No respiratory distress.      Breath sounds: Normal breath sounds. No wheezing or rales.   Abdominal:      General: Bowel sounds are normal. There is no distension.      Palpations: Abdomen is soft.      Tenderness: There is no abdominal tenderness. There is no guarding or rebound.   Musculoskeletal:      Cervical back: Neck supple. No tenderness.      Right lower leg: No edema.      Left lower leg: No edema.   Lymphadenopathy:      Cervical: No cervical adenopathy.   Skin:     General: Skin is warm.      Findings: No rash.   Neurological:      General: No focal deficit present.      Mental Status: He is alert and oriented to person, place, and time.   Psychiatric:         Behavior: Behavior normal.         Significant Labs: All pertinent labs within the past 24 hours have been reviewed.    Recent Results (from the past 24 hour(s))   Troponin I    Collection Time: 08/19/23 12:13 PM   Result Value Ref Range    Troponin I 4.207 (H) 0.000 - 0.026 ng/mL   CBC auto differential    Collection Time: 08/19/23 12:13 PM   Result Value Ref Range    WBC 22.26 (H) 3.90 - 12.70 K/uL    RBC 2.94 (L) 4.60 - 6.20 M/uL    Hemoglobin 8.7 (L) 14.0 - 18.0 g/dL    Hematocrit 26.2 (L) 40.0 - 54.0 %    MCV 89 82 - 98 fL     MCH 29.6 27.0 - 31.0 pg    MCHC 33.2 32.0 - 36.0 g/dL    RDW 17.9 (H) 11.5 - 14.5 %    Platelets 204 150 - 450 K/uL    MPV 10.4 9.2 - 12.9 fL    Immature Granulocytes 0.8 (H) 0.0 - 0.5 %    Gran # (ANC) 18.8 (H) 1.8 - 7.7 K/uL    Immature Grans (Abs) 0.18 (H) 0.00 - 0.04 K/uL    Lymph # 1.0 1.0 - 4.8 K/uL    Mono # 2.2 (H) 0.3 - 1.0 K/uL    Eos # 0.1 0.0 - 0.5 K/uL    Baso # 0.04 0.00 - 0.20 K/uL    nRBC 0 0 /100 WBC    Gran % 84.6 (H) 38.0 - 73.0 %    Lymph % 4.4 (L) 18.0 - 48.0 %    Mono % 9.8 4.0 - 15.0 %    Eosinophil % 0.2 0.0 - 8.0 %    Basophil % 0.2 0.0 - 1.9 %    Platelet Estimate Appears normal     Differential Method Automated    Hepatitis B surface antigen    Collection Time: 08/19/23 12:20 PM   Result Value Ref Range    Hepatitis B Surface Ag Non-reactive Non-reactive   POCT glucose    Collection Time: 08/19/23  1:06 PM   Result Value Ref Range    POCT Glucose 128 (H) 70 - 110 mg/dL   Basic metabolic panel    Collection Time: 08/19/23  3:29 PM   Result Value Ref Range    Sodium 141 136 - 145 mmol/L    Potassium 4.5 3.5 - 5.1 mmol/L    Chloride 103 95 - 110 mmol/L    CO2 22 (L) 23 - 29 mmol/L    Glucose 159 (H) 70 - 110 mg/dL    BUN 44 (H) 8 - 23 mg/dL    Creatinine 4.8 (H) 0.5 - 1.4 mg/dL    Calcium 9.0 8.7 - 10.5 mg/dL    Anion Gap 16 8 - 16 mmol/L    eGFR 11.2 (A) >60 mL/min/1.73 m^2   Troponin I    Collection Time: 08/19/23  5:00 PM   Result Value Ref Range    Troponin I 3.544 (H) 0.000 - 0.026 ng/mL   POCT glucose    Collection Time: 08/19/23  6:10 PM   Result Value Ref Range    POCT Glucose 163 (H) 70 - 110 mg/dL   CBC auto differential    Collection Time: 08/19/23 10:53 PM   Result Value Ref Range    WBC 13.27 (H) 3.90 - 12.70 K/uL    RBC 2.33 (L) 4.60 - 6.20 M/uL    Hemoglobin 6.9 (L) 14.0 - 18.0 g/dL    Hematocrit 21.4 (L) 40.0 - 54.0 %    MCV 92 82 - 98 fL    MCH 29.6 27.0 - 31.0 pg    MCHC 32.2 32.0 - 36.0 g/dL    RDW 18.5 (H) 11.5 - 14.5 %    Platelets 170 150 - 450 K/uL    MPV 10.9 9.2 -  12.9 fL    Immature Granulocytes 0.5 0.0 - 0.5 %    Gran # (ANC) 10.3 (H) 1.8 - 7.7 K/uL    Immature Grans (Abs) 0.07 (H) 0.00 - 0.04 K/uL    Lymph # 1.1 1.0 - 4.8 K/uL    Mono # 1.7 (H) 0.3 - 1.0 K/uL    Eos # 0.1 0.0 - 0.5 K/uL    Baso # 0.02 0.00 - 0.20 K/uL    nRBC 0 0 /100 WBC    Gran % 77.4 (H) 38.0 - 73.0 %    Lymph % 8.4 (L) 18.0 - 48.0 %    Mono % 12.7 4.0 - 15.0 %    Eosinophil % 0.8 0.0 - 8.0 %    Basophil % 0.2 0.0 - 1.9 %    Differential Method Automated    POCT glucose    Collection Time: 08/19/23 11:01 PM   Result Value Ref Range    POCT Glucose 162 (H) 70 - 110 mg/dL   Basic metabolic panel    Collection Time: 08/20/23  5:42 AM   Result Value Ref Range    Sodium 135 (L) 136 - 145 mmol/L    Potassium 5.6 (H) 3.5 - 5.1 mmol/L    Chloride 101 95 - 110 mmol/L    CO2 24 23 - 29 mmol/L    Glucose 113 (H) 70 - 110 mg/dL    BUN 74 (H) 8 - 23 mg/dL    Creatinine 6.5 (H) 0.5 - 1.4 mg/dL    Calcium 9.0 8.7 - 10.5 mg/dL    Anion Gap 10 8 - 16 mmol/L    eGFR 7.8 (A) >60 mL/min/1.73 m^2   CBC auto differential    Collection Time: 08/20/23  5:42 AM   Result Value Ref Range    WBC 11.72 3.90 - 12.70 K/uL    RBC 2.18 (L) 4.60 - 6.20 M/uL    Hemoglobin 6.4 (L) 14.0 - 18.0 g/dL    Hematocrit 20.0 (L) 40.0 - 54.0 %    MCV 92 82 - 98 fL    MCH 29.4 27.0 - 31.0 pg    MCHC 32.0 32.0 - 36.0 g/dL    RDW 18.6 (H) 11.5 - 14.5 %    Platelets 154 150 - 450 K/uL    MPV 11.3 9.2 - 12.9 fL    Immature Granulocytes 0.6 (H) 0.0 - 0.5 %    Gran # (ANC) 8.6 (H) 1.8 - 7.7 K/uL    Immature Grans (Abs) 0.07 (H) 0.00 - 0.04 K/uL    Lymph # 1.3 1.0 - 4.8 K/uL    Mono # 1.6 (H) 0.3 - 1.0 K/uL    Eos # 0.1 0.0 - 0.5 K/uL    Baso # 0.03 0.00 - 0.20 K/uL    nRBC 0 0 /100 WBC    Gran % 72.9 38.0 - 73.0 %    Lymph % 11.0 (L) 18.0 - 48.0 %    Mono % 14.0 4.0 - 15.0 %    Eosinophil % 1.2 0.0 - 8.0 %    Basophil % 0.3 0.0 - 1.9 %    Differential Method Automated        Significant Imaging: I have reviewed all pertinent imaging results/findings  within the past 24 hours.            Assessment/Plan:      * Acute blood loss anemia  -FOBT positive at [facility] ED, s/p 2unit PRBC prior to transfer  -CBC TID as ongoing melena this AM  -Transfuse prn for Hgb >2 (2 Units here thus far)  -CP with improvement but ongoing generalized weakness  -pt with ESRD has anemia related to CKD also   -IV PPI BID   -Clears, prep today, plan for  EGD/colonoscopy on 8/21       Acute respiratory failure with hypoxia  Patient with Hypoxic Respiratory failure which is Acute.  he is not on home oxygen. Supplemental oxygen was provided and noted-      .   Signs/symptoms of respiratory failure include- tachypnea and increased work of breathing. Contributing diagnoses includes - CHF and ESRD and Recent PRBC transfusion Labs and images were reviewed. Patient Has not had a recent ABG. Will treat underlying causes and adjust management of respiratory failure as follows-     · Obtained repeat CXR on arrival: central vascular congestion.  No significant change in cardiopulmonary status, noting continued mild increased perihilar interstitial attenuation, similar to prior examination.  No new large confluent airspace consolidation or pneumothorax identified  · Continue supplemental oxygen - pt symptomatic without it, sats down to 93% after 10min of O2 removal.   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   -PICC line placed for continuous home infusion Pen G 8million units/daily End date is 08/23/23. Appreciate clinical pharmD assistance. Continue Pen G 10million units continuous infusion.      HFrEF (heart failure with reduced ejection fraction)  Chronic stable  -continue Diuretics Lasix  on non-HD days - tue/thu/sat.   Pending response with GI bleed w/u and NSTEMI - change to a BID(Tue/Thu/Sat/Sun) frequency per home med rec dosing.    Atrial flutter  -patient in sinus rhythm-rate controlled., continue cardiac monitoring and coreg  -Currently Apixaban is contraindicated due to concern for possible GI bleeding.       Type II NSTEMI (demand ischemia)  Etiology of Type 2 MI thought to be secondary to Symptomatic anemia  --Trend Troponin here 2.884->3.79->4.207->3.554  -EKG prn for chest pain  -Continue outpatient Coreg BID  -Echo pending  -Appreciate cardiology eval    Type 2 diabetes mellitus with chronic kidney disease on chronic dialysis, without long-term current use of insulin  Patient's FSGs are controlled on current medication regimen.  Last A1c reviewed-   Lab Results   Component Value Date    HGBA1C 5.1 08/19/2023     Most recent fingerstick glucose reviewed-   Recent Labs   Lab 08/19/23  1306 08/19/23  1810 08/19/23  2301   POCTGLUCOSE 128* 163* 162*     Current correctional scale  Low  Maintain anti-hyperglycemic dose as follows-   Antihyperglycemics (From admission, onward)    Start     Stop Route Frequency Ordered    08/18/23 2211  insulin aspart U-100 pen 0-5 Units         -- SubQ Before meals & nightly PRN 08/18/23 2111        Hold Oral hypoglycemics while patient is in the hospital.    ESRD on dialysis  Last HD reported in EMR as on 8/16,  Missed scheduled dialysis today  -continue relevant home medications  -Consult Nephrology for continued inpatient HD treatments        Primary hypertension  chornic  -Continue Carvedilol at lower dosing in setting of GI bleed and soft bPs        VTE Risk Mitigation (From admission, onward)         Ordered     heparin (porcine) injection 1,000 Units  As needed (PRN)         08/19/23 0718     Reason for No Pharmacological VTE Prophylaxis  Once        Question:  Reasons:  Answer:  Risk of Bleeding    08/18/23 2104     IP VTE HIGH RISK PATIENT  Once          08/18/23 2104     Place sequential compression device  Until discontinued         08/18/23 2104                Discharge Planning   GABE: 8/22/2023     Code Status: Full Code   Is the patient medically ready for discharge?: No    Reason for patient still in hospital (select all that apply): Patient unstable, Patient trending condition, Laboratory test, Treatment, Imaging, Consult recommendations, PT / OT recommendations and Pending disposition                     Marsha Dixon MD  Department of Hospital Medicine   [facility] - Intensive Care ([place] [address])

## 2023-08-20 NOTE — TREATMENT PLAN
Please make sure patient starts Golytely prep at 6PM. Prep should be completed within 2 hours for best results, but must be completed within 4 hours of starting the prep.    Plan for EGD/Colon on 8/21.     Trell Bui DO, MS  Gastroenterology PGY-4

## 2023-08-20 NOTE — SUBJECTIVE & OBJECTIVE
Interval History:  Patient seen and examined at bedside.    Per overnight, c/o SOB and HA while receiving blood transfusion overnight.  Decrease in transfusion rate; patient denies any shortness of breath, headache, chest pain this morning.  K again elevated- 5.6; Hgb 6.4.   Per nursing, episode of melena this AM.   Patient updated regarding care plan.      Review of Systems   Constitutional:  Positive for activity change, appetite change and fatigue. Negative for chills.   HENT:  Positive for congestion. Negative for sore throat and trouble swallowing.    Respiratory:  Positive for cough. Negative for shortness of breath.    Cardiovascular:  Negative for palpitations and leg swelling.   Gastrointestinal:  Negative for abdominal pain, nausea and vomiting.   Skin:  Negative for rash.   Neurological:  Positive for weakness. Negative for headaches.   Psychiatric/Behavioral: Negative.         Objective:    Temp: 97.9 °F (36.6 °C) (08/20/23 1048)  Pulse: 78 (08/20/23 1059)  Resp: 18 (08/20/23 1048)  BP: (!) 119/48 (08/20/23 1048)  SpO2: 100 % (08/20/23 1048)    Weight: 59.5 kg (131 lb 2.8 oz) (08/20/23 0500)    Body mass index is 20.54 kg/m².    Physical Exam  Vitals and nursing note reviewed.   Constitutional:       Appearance: He is ill-appearing. He is not toxic-appearing or diaphoretic.      Interventions: Nasal cannula in place.      Comments: RUE PICC LINE   HENT:      Head: Normocephalic and atraumatic.      Mouth/Throat:      Mouth: Mucous membranes are moist.      Pharynx: Oropharynx is clear.   Eyes:      General: No scleral icterus.        Right eye: No discharge.         Left eye: No discharge.   Cardiovascular:      Rate and Rhythm: Normal rate.      Pulses: Normal pulses.      Heart sounds: Murmur heard.      Comments: LUE AVF with thrill/bruit  Pulmonary:      Effort: Pulmonary effort is normal. No respiratory distress.      Breath sounds: Normal breath sounds. No wheezing or rales.   Abdominal:       General: Bowel sounds are normal. There is no distension.      Palpations: Abdomen is soft.      Tenderness: There is no abdominal tenderness. There is no guarding or rebound.   Musculoskeletal:      Cervical back: Neck supple. No tenderness.      Right lower leg: No edema.      Left lower leg: No edema.   Lymphadenopathy:      Cervical: No cervical adenopathy.   Skin:     General: Skin is warm.      Findings: No rash.   Neurological:      General: No focal deficit present.      Mental Status: He is alert and oriented to person, place, and time.   Psychiatric:         Behavior: Behavior normal.         Significant Labs: All pertinent labs within the past 24 hours have been reviewed.    Recent Results (from the past 24 hour(s))   Troponin I    Collection Time: 08/19/23 12:13 PM   Result Value Ref Range    Troponin I 4.207 (H) 0.000 - 0.026 ng/mL   CBC auto differential    Collection Time: 08/19/23 12:13 PM   Result Value Ref Range    WBC 22.26 (H) 3.90 - 12.70 K/uL    RBC 2.94 (L) 4.60 - 6.20 M/uL    Hemoglobin 8.7 (L) 14.0 - 18.0 g/dL    Hematocrit 26.2 (L) 40.0 - 54.0 %    MCV 89 82 - 98 fL    MCH 29.6 27.0 - 31.0 pg    MCHC 33.2 32.0 - 36.0 g/dL    RDW 17.9 (H) 11.5 - 14.5 %    Platelets 204 150 - 450 K/uL    MPV 10.4 9.2 - 12.9 fL    Immature Granulocytes 0.8 (H) 0.0 - 0.5 %    Gran # (ANC) 18.8 (H) 1.8 - 7.7 K/uL    Immature Grans (Abs) 0.18 (H) 0.00 - 0.04 K/uL    Lymph # 1.0 1.0 - 4.8 K/uL    Mono # 2.2 (H) 0.3 - 1.0 K/uL    Eos # 0.1 0.0 - 0.5 K/uL    Baso # 0.04 0.00 - 0.20 K/uL    nRBC 0 0 /100 WBC    Gran % 84.6 (H) 38.0 - 73.0 %    Lymph % 4.4 (L) 18.0 - 48.0 %    Mono % 9.8 4.0 - 15.0 %    Eosinophil % 0.2 0.0 - 8.0 %    Basophil % 0.2 0.0 - 1.9 %    Platelet Estimate Appears normal     Differential Method Automated    Hepatitis B surface antigen    Collection Time: 08/19/23 12:20 PM   Result Value Ref Range    Hepatitis B Surface Ag Non-reactive Non-reactive   POCT glucose    Collection Time: 08/19/23   1:06 PM   Result Value Ref Range    POCT Glucose 128 (H) 70 - 110 mg/dL   Basic metabolic panel    Collection Time: 08/19/23  3:29 PM   Result Value Ref Range    Sodium 141 136 - 145 mmol/L    Potassium 4.5 3.5 - 5.1 mmol/L    Chloride 103 95 - 110 mmol/L    CO2 22 (L) 23 - 29 mmol/L    Glucose 159 (H) 70 - 110 mg/dL    BUN 44 (H) 8 - 23 mg/dL    Creatinine 4.8 (H) 0.5 - 1.4 mg/dL    Calcium 9.0 8.7 - 10.5 mg/dL    Anion Gap 16 8 - 16 mmol/L    eGFR 11.2 (A) >60 mL/min/1.73 m^2   Troponin I    Collection Time: 08/19/23  5:00 PM   Result Value Ref Range    Troponin I 3.544 (H) 0.000 - 0.026 ng/mL   POCT glucose    Collection Time: 08/19/23  6:10 PM   Result Value Ref Range    POCT Glucose 163 (H) 70 - 110 mg/dL   CBC auto differential    Collection Time: 08/19/23 10:53 PM   Result Value Ref Range    WBC 13.27 (H) 3.90 - 12.70 K/uL    RBC 2.33 (L) 4.60 - 6.20 M/uL    Hemoglobin 6.9 (L) 14.0 - 18.0 g/dL    Hematocrit 21.4 (L) 40.0 - 54.0 %    MCV 92 82 - 98 fL    MCH 29.6 27.0 - 31.0 pg    MCHC 32.2 32.0 - 36.0 g/dL    RDW 18.5 (H) 11.5 - 14.5 %    Platelets 170 150 - 450 K/uL    MPV 10.9 9.2 - 12.9 fL    Immature Granulocytes 0.5 0.0 - 0.5 %    Gran # (ANC) 10.3 (H) 1.8 - 7.7 K/uL    Immature Grans (Abs) 0.07 (H) 0.00 - 0.04 K/uL    Lymph # 1.1 1.0 - 4.8 K/uL    Mono # 1.7 (H) 0.3 - 1.0 K/uL    Eos # 0.1 0.0 - 0.5 K/uL    Baso # 0.02 0.00 - 0.20 K/uL    nRBC 0 0 /100 WBC    Gran % 77.4 (H) 38.0 - 73.0 %    Lymph % 8.4 (L) 18.0 - 48.0 %    Mono % 12.7 4.0 - 15.0 %    Eosinophil % 0.8 0.0 - 8.0 %    Basophil % 0.2 0.0 - 1.9 %    Differential Method Automated    POCT glucose    Collection Time: 08/19/23 11:01 PM   Result Value Ref Range    POCT Glucose 162 (H) 70 - 110 mg/dL   Basic metabolic panel    Collection Time: 08/20/23  5:42 AM   Result Value Ref Range    Sodium 135 (L) 136 - 145 mmol/L    Potassium 5.6 (H) 3.5 - 5.1 mmol/L    Chloride 101 95 - 110 mmol/L    CO2 24 23 - 29 mmol/L    Glucose 113 (H) 70 - 110 mg/dL     BUN 74 (H) 8 - 23 mg/dL    Creatinine 6.5 (H) 0.5 - 1.4 mg/dL    Calcium 9.0 8.7 - 10.5 mg/dL    Anion Gap 10 8 - 16 mmol/L    eGFR 7.8 (A) >60 mL/min/1.73 m^2   CBC auto differential    Collection Time: 08/20/23  5:42 AM   Result Value Ref Range    WBC 11.72 3.90 - 12.70 K/uL    RBC 2.18 (L) 4.60 - 6.20 M/uL    Hemoglobin 6.4 (L) 14.0 - 18.0 g/dL    Hematocrit 20.0 (L) 40.0 - 54.0 %    MCV 92 82 - 98 fL    MCH 29.4 27.0 - 31.0 pg    MCHC 32.0 32.0 - 36.0 g/dL    RDW 18.6 (H) 11.5 - 14.5 %    Platelets 154 150 - 450 K/uL    MPV 11.3 9.2 - 12.9 fL    Immature Granulocytes 0.6 (H) 0.0 - 0.5 %    Gran # (ANC) 8.6 (H) 1.8 - 7.7 K/uL    Immature Grans (Abs) 0.07 (H) 0.00 - 0.04 K/uL    Lymph # 1.3 1.0 - 4.8 K/uL    Mono # 1.6 (H) 0.3 - 1.0 K/uL    Eos # 0.1 0.0 - 0.5 K/uL    Baso # 0.03 0.00 - 0.20 K/uL    nRBC 0 0 /100 WBC    Gran % 72.9 38.0 - 73.0 %    Lymph % 11.0 (L) 18.0 - 48.0 %    Mono % 14.0 4.0 - 15.0 %    Eosinophil % 1.2 0.0 - 8.0 %    Basophil % 0.3 0.0 - 1.9 %    Differential Method Automated        Significant Imaging: I have reviewed all pertinent imaging results/findings within the past 24 hours.

## 2023-08-20 NOTE — ASSESSMENT & PLAN NOTE
Patient is an 83 year old male with anemia, ESRD on HD, HFrEF( most recent EF 30%), atrial flutter on Eliquis status post DCCV April 2023, neurosyphilis, HLD, HTN presented to [facility] on August 18 with chest discomfort. Transferred to [place] with Cardiology consultation, and then transferred to [facility] for higher level of care. ECG 08/18/2023 at 6 AM independently reviewed, showed diffuse ST depressions in the anterior, inferior, and lateral leads with aVR elevation. ECG 08/19/2023 independently reviewed, showed NST, 1st degree AV block, and complete resolution of ST-T changes. Patient continues to report being angina free. Patient had troponin elevation 0.094 --> 2.88--> 3.79 --> 4.21. While biomarker elevation is concerning for underlying coronary artery disease, this presentation is not consistent with acute plaque rupture. This presentation is likely a Type II MI secondary to demand ischemia from acute blood loss anemia (Hgb 6.1 g/dL at OSH).     - Agree with GI consultation to evaluate etiology of acute blood loss anemia  - Echo was already ordered, will follow  - Recommend continuing anti-anginal therapy with beta - blocker  - Patient is already at target LDL < 70 mg/dL (currently 58 mg/dL) without statin  - Consider isosorbide mononitrate if anginal symptoms worsen and BP tolerates

## 2023-08-20 NOTE — ASSESSMENT & PLAN NOTE
-FOBT positive at [facility] ED, s/p 2unit PRBC prior to transfer  -CBC TID as ongoing melena this AM  -Transfuse prn for Hgb >2 (2 Units here thus far)  -CP with improvement but ongoing generalized weakness  -pt with ESRD has anemia related to CKD also   -IV PPI BID   -Clears, prep today, plan for  EGD/colonoscopy on 8/21

## 2023-08-20 NOTE — PLAN OF CARE
08/20/23 1713   Post-Acute Status   Post-Acute Authorization Other   [facility]   Other Status No Post-Acute Service Needs   Discharge Delays None known at this time   Discharge Plan   Discharge Plan A Home;Home with family     Patient lives at home with his wife. Patient stated that he is independent and can safely return to his home on discharge.     Patient is presently getting Dialysis at [facility] M/W/F @6am.    Patient denies any post acute needs at this time.     Luz Ramos, KHADAR, MSW-LMSW  Medical Social Worker/  ER Department

## 2023-08-20 NOTE — ASSESSMENT & PLAN NOTE
Patient with Hypoxic Respiratory failure which is Acute.  he is not on home oxygen. Supplemental oxygen was provided and noted-      .   Signs/symptoms of respiratory failure include- tachypnea and increased work of breathing. Contributing diagnoses includes - CHF and ESRD and Recent PRBC transfusion Labs and images were reviewed. Patient Has not had a recent ABG. Will treat underlying causes and adjust management of respiratory failure as follows-     · Obtained repeat CXR on arrival: central vascular congestion.  No significant change in cardiopulmonary status, noting continued mild increased perihilar interstitial attenuation, similar to prior examination.  No new large confluent airspace consolidation or pneumothorax identified  · Continue supplemental oxygen - pt symptomatic without it, sats down to 93% after 10min of O2 removal.   · Wean supplemental oxygen as tolerated  · HD for volume mx

## 2023-08-20 NOTE — ASSESSMENT & PLAN NOTE
Patient's FSGs are controlled on current medication regimen.  Last A1c reviewed-   Lab Results   Component Value Date    HGBA1C 5.1 08/19/2023     Most recent fingerstick glucose reviewed-   Recent Labs   Lab 08/19/23  1306 08/19/23  1810 08/19/23  2301   POCTGLUCOSE 128* 163* 162*     Current correctional scale  Low  Maintain anti-hyperglycemic dose as follows-   Antihyperglycemics (From admission, onward)    Start     Stop Route Frequency Ordered    08/18/23 2211  insulin aspart U-100 pen 0-5 Units         -- SubQ Before meals & nightly PRN 08/18/23 2111        Hold Oral hypoglycemics while patient is in the hospital.

## 2023-08-21 ENCOUNTER — ANESTHESIA EVENT (OUTPATIENT)
Dept: ENDOSCOPY | Facility: HOSPITAL | Age: 83
DRG: 811 | End: 2023-08-21
Payer: MEDICARE

## 2023-08-21 ENCOUNTER — ANESTHESIA (OUTPATIENT)
Dept: ENDOSCOPY | Facility: HOSPITAL | Age: 83
DRG: 811 | End: 2023-08-21
Payer: MEDICARE

## 2023-08-21 DIAGNOSIS — D50.9 MICROCYTIC ANEMIA: Primary | ICD-10-CM

## 2023-08-21 LAB
ANION GAP SERPL CALC-SCNC: 20 MMOL/L (ref 8–16)
ASCENDING AORTA: 3.23 CM
AV INDEX (PROSTH): 0.49
AV MEAN GRADIENT: 9 MMHG
AV PEAK GRADIENT: 14 MMHG
AV VALVE AREA BY VELOCITY RATIO: 1.78 CM²
AV VALVE AREA: 1.86 CM²
AV VELOCITY RATIO: 0.47
BASOPHILS # BLD AUTO: 0.03 K/UL (ref 0–0.2)
BASOPHILS # BLD AUTO: 0.04 K/UL (ref 0–0.2)
BASOPHILS NFR BLD: 0.3 % (ref 0–1.9)
BASOPHILS NFR BLD: 0.3 % (ref 0–1.9)
BSA FOR ECHO PROCEDURE: 1.69 M2
BUN SERPL-MCNC: 83 MG/DL (ref 8–23)
CALCIUM SERPL-MCNC: 9 MG/DL (ref 8.7–10.5)
CHLORIDE SERPL-SCNC: 95 MMOL/L (ref 95–110)
CO2 SERPL-SCNC: 21 MMOL/L (ref 23–29)
CREAT SERPL-MCNC: 7.6 MG/DL (ref 0.5–1.4)
CV ECHO LV RWT: 0.51 CM
DIFFERENTIAL METHOD: ABNORMAL
DIFFERENTIAL METHOD: ABNORMAL
DOP CALC AO PEAK VEL: 1.9 M/S
DOP CALC AO VTI: 38.96 CM
DOP CALC LVOT AREA: 3.8 CM2
DOP CALC LVOT DIAMETER: 2.2 CM
DOP CALC LVOT PEAK VEL: 0.89 M/S
DOP CALC LVOT STROKE VOLUME: 72.38 CM3
DOP CALCLVOT PEAK VEL VTI: 19.05 CM
E WAVE DECELERATION TIME: 206.81 MSEC
E/A RATIO: 1.9
E/E' RATIO: 8 M/S
ECHO LV POSTERIOR WALL: 1.14 CM (ref 0.6–1.1)
EJECTION FRACTION: 45 %
EOSINOPHIL # BLD AUTO: 0.3 K/UL (ref 0–0.5)
EOSINOPHIL # BLD AUTO: 0.3 K/UL (ref 0–0.5)
EOSINOPHIL NFR BLD: 2.5 % (ref 0–8)
EOSINOPHIL NFR BLD: 2.5 % (ref 0–8)
ERYTHROCYTE [DISTWIDTH] IN BLOOD BY AUTOMATED COUNT: 17.4 % (ref 11.5–14.5)
ERYTHROCYTE [DISTWIDTH] IN BLOOD BY AUTOMATED COUNT: 17.7 % (ref 11.5–14.5)
EST. GFR  (NO RACE VARIABLE): 6.5 ML/MIN/1.73 M^2
FRACTIONAL SHORTENING: 18 % (ref 28–44)
GLUCOSE SERPL-MCNC: 115 MG/DL (ref 70–110)
HCT VFR BLD AUTO: 22.8 % (ref 40–54)
HCT VFR BLD AUTO: 24.6 % (ref 40–54)
HGB BLD-MCNC: 7.5 G/DL (ref 14–18)
HGB BLD-MCNC: 8 G/DL (ref 14–18)
IMM GRANULOCYTES # BLD AUTO: 0.03 K/UL (ref 0–0.04)
IMM GRANULOCYTES # BLD AUTO: 0.04 K/UL (ref 0–0.04)
IMM GRANULOCYTES NFR BLD AUTO: 0.3 % (ref 0–0.5)
IMM GRANULOCYTES NFR BLD AUTO: 0.3 % (ref 0–0.5)
INTERVENTRICULAR SEPTUM: 0.92 CM (ref 0.6–1.1)
LA MAJOR: 4.21 CM
LA MINOR: 5.85 CM
LA WIDTH: 3.47 CM
LEFT ATRIUM SIZE: 3.59 CM
LEFT ATRIUM VOLUME INDEX MOD: 17.1 ML/M2
LEFT ATRIUM VOLUME INDEX: 30.5 ML/M2
LEFT ATRIUM VOLUME MOD: 29.09 CM3
LEFT ATRIUM VOLUME: 51.85 CM3
LEFT INTERNAL DIMENSION IN SYSTOLE: 3.69 CM (ref 2.1–4)
LEFT VENTRICLE DIASTOLIC VOLUME INDEX: 54.71 ML/M2
LEFT VENTRICLE DIASTOLIC VOLUME: 93.01 ML
LEFT VENTRICLE MASS INDEX: 94 G/M2
LEFT VENTRICLE SYSTOLIC VOLUME INDEX: 33.9 ML/M2
LEFT VENTRICLE SYSTOLIC VOLUME: 57.66 ML
LEFT VENTRICULAR INTERNAL DIMENSION IN DIASTOLE: 4.51 CM (ref 3.5–6)
LEFT VENTRICULAR MASS: 160.23 G
LV LATERAL E/E' RATIO: 6.67 M/S
LV SEPTAL E/E' RATIO: 10 M/S
LYMPHOCYTES # BLD AUTO: 0.9 K/UL (ref 1–4.8)
LYMPHOCYTES # BLD AUTO: 1.2 K/UL (ref 1–4.8)
LYMPHOCYTES NFR BLD: 10.4 % (ref 18–48)
LYMPHOCYTES NFR BLD: 8.9 % (ref 18–48)
MAGNESIUM SERPL-MCNC: 2.2 MG/DL (ref 1.6–2.6)
MCH RBC QN AUTO: 29.5 PG (ref 27–31)
MCH RBC QN AUTO: 29.6 PG (ref 27–31)
MCHC RBC AUTO-ENTMCNC: 32.5 G/DL (ref 32–36)
MCHC RBC AUTO-ENTMCNC: 32.9 G/DL (ref 32–36)
MCV RBC AUTO: 90 FL (ref 82–98)
MCV RBC AUTO: 91 FL (ref 82–98)
MONOCYTES # BLD AUTO: 1.2 K/UL (ref 0.3–1)
MONOCYTES # BLD AUTO: 1.4 K/UL (ref 0.3–1)
MONOCYTES NFR BLD: 11.5 % (ref 4–15)
MONOCYTES NFR BLD: 12.1 % (ref 4–15)
MV PEAK A VEL: 0.42 M/S
MV PEAK E VEL: 0.8 M/S
MV STENOSIS PRESSURE HALF TIME: 59.97 MS
MV VALVE AREA P 1/2 METHOD: 3.67 CM2
NEUTROPHILS # BLD AUTO: 7.9 K/UL (ref 1.8–7.7)
NEUTROPHILS # BLD AUTO: 8.5 K/UL (ref 1.8–7.7)
NEUTROPHILS NFR BLD: 74.4 % (ref 38–73)
NEUTROPHILS NFR BLD: 76.5 % (ref 38–73)
NRBC BLD-RTO: 0 /100 WBC
NRBC BLD-RTO: 0 /100 WBC
PHOSPHATE SERPL-MCNC: 5.2 MG/DL (ref 2.7–4.5)
PISA TR MAX VEL: 3.39 M/S
PLATELET # BLD AUTO: 156 K/UL (ref 150–450)
PLATELET # BLD AUTO: 180 K/UL (ref 150–450)
PMV BLD AUTO: 10.9 FL (ref 9.2–12.9)
PMV BLD AUTO: 11.5 FL (ref 9.2–12.9)
POCT GLUCOSE: 124 MG/DL (ref 70–110)
POTASSIUM SERPL-SCNC: 5 MMOL/L (ref 3.5–5.1)
RA MAJOR: 4.78 CM
RA PRESSURE ESTIMATED: 8 MMHG
RA WIDTH: 3.25 CM
RBC # BLD AUTO: 2.53 M/UL (ref 4.6–6.2)
RBC # BLD AUTO: 2.71 M/UL (ref 4.6–6.2)
RV TB RVSP: 11 MMHG
RV TISSUE DOPPLER FREE WALL SYSTOLIC VELOCITY 1 (APICAL 4 CHAMBER VIEW): 6.02 CM/S
SINUS: 1.67 CM
SODIUM SERPL-SCNC: 136 MMOL/L (ref 136–145)
STJ: 3.19 CM
TDI LATERAL: 0.12 M/S
TDI SEPTAL: 0.08 M/S
TDI: 0.1 M/S
TR MAX PG: 46 MMHG
TRICUSPID ANNULAR PLANE SYSTOLIC EXCURSION: 1.6 CM
TV REST PULMONARY ARTERY PRESSURE: 54 MMHG
WBC # BLD AUTO: 10.32 K/UL (ref 3.9–12.7)
WBC # BLD AUTO: 11.48 K/UL (ref 3.9–12.7)
Z-SCORE OF LEFT VENTRICULAR DIMENSION IN END DIASTOLE: -0.47
Z-SCORE OF LEFT VENTRICULAR DIMENSION IN END SYSTOLE: 1.83

## 2023-08-21 PROCEDURE — 45378 DIAGNOSTIC COLONOSCOPY: CPT | Performed by: INTERNAL MEDICINE

## 2023-08-21 PROCEDURE — 63600175 PHARM REV CODE 636 W HCPCS: Performed by: HOSPITALIST

## 2023-08-21 PROCEDURE — 43235 EGD DIAGNOSTIC BRUSH WASH: CPT | Performed by: INTERNAL MEDICINE

## 2023-08-21 PROCEDURE — C9113 INJ PANTOPRAZOLE SODIUM, VIA: HCPCS | Performed by: HOSPITALIST

## 2023-08-21 PROCEDURE — 85025 COMPLETE CBC W/AUTO DIFF WBC: CPT | Mod: 91 | Performed by: STUDENT IN AN ORGANIZED HEALTH CARE EDUCATION/TRAINING PROGRAM

## 2023-08-21 PROCEDURE — 63600175 PHARM REV CODE 636 W HCPCS: Performed by: NURSE ANESTHETIST, CERTIFIED REGISTERED

## 2023-08-21 PROCEDURE — 27000221 HC OXYGEN, UP TO 24 HOURS

## 2023-08-21 PROCEDURE — 99232 SBSQ HOSP IP/OBS MODERATE 35: CPT | Mod: ,,, | Performed by: STUDENT IN AN ORGANIZED HEALTH CARE EDUCATION/TRAINING PROGRAM

## 2023-08-21 PROCEDURE — 37000009 HC ANESTHESIA EA ADD 15 MINS: Performed by: INTERNAL MEDICINE

## 2023-08-21 PROCEDURE — 45378 DIAGNOSTIC COLONOSCOPY: CPT | Mod: GC,,, | Performed by: INTERNAL MEDICINE

## 2023-08-21 PROCEDURE — 84100 ASSAY OF PHOSPHORUS: CPT | Performed by: STUDENT IN AN ORGANIZED HEALTH CARE EDUCATION/TRAINING PROGRAM

## 2023-08-21 PROCEDURE — 45378 PR COLONOSCOPY,DIAGNOSTIC: ICD-10-PCS | Mod: GC,,, | Performed by: INTERNAL MEDICINE

## 2023-08-21 PROCEDURE — D9220A PRA ANESTHESIA: Mod: ANES,,, | Performed by: ANESTHESIOLOGY

## 2023-08-21 PROCEDURE — D9220A PRA ANESTHESIA: Mod: CRNA,,, | Performed by: NURSE ANESTHETIST, CERTIFIED REGISTERED

## 2023-08-21 PROCEDURE — 83735 ASSAY OF MAGNESIUM: CPT | Performed by: STUDENT IN AN ORGANIZED HEALTH CARE EDUCATION/TRAINING PROGRAM

## 2023-08-21 PROCEDURE — 94761 N-INVAS EAR/PLS OXIMETRY MLT: CPT

## 2023-08-21 PROCEDURE — 80048 BASIC METABOLIC PNL TOTAL CA: CPT | Performed by: STUDENT IN AN ORGANIZED HEALTH CARE EDUCATION/TRAINING PROGRAM

## 2023-08-21 PROCEDURE — 25000003 PHARM REV CODE 250: Performed by: NURSE ANESTHETIST, CERTIFIED REGISTERED

## 2023-08-21 PROCEDURE — 43235 EGD DIAGNOSTIC BRUSH WASH: CPT | Mod: 51,GC,, | Performed by: INTERNAL MEDICINE

## 2023-08-21 PROCEDURE — 37000008 HC ANESTHESIA 1ST 15 MINUTES: Performed by: INTERNAL MEDICINE

## 2023-08-21 PROCEDURE — 99232 PR SUBSEQUENT HOSPITAL CARE,LEVL II: ICD-10-PCS | Mod: ,,, | Performed by: STUDENT IN AN ORGANIZED HEALTH CARE EDUCATION/TRAINING PROGRAM

## 2023-08-21 PROCEDURE — 25000003 PHARM REV CODE 250: Performed by: HOSPITALIST

## 2023-08-21 PROCEDURE — 20600001 HC STEP DOWN PRIVATE ROOM

## 2023-08-21 PROCEDURE — D9220A PRA ANESTHESIA: ICD-10-PCS | Mod: ANES,,, | Performed by: ANESTHESIOLOGY

## 2023-08-21 PROCEDURE — D9220A PRA ANESTHESIA: ICD-10-PCS | Mod: CRNA,,, | Performed by: NURSE ANESTHETIST, CERTIFIED REGISTERED

## 2023-08-21 PROCEDURE — 43235 PR EGD, FLEX, DIAGNOSTIC: ICD-10-PCS | Mod: 51,GC,, | Performed by: INTERNAL MEDICINE

## 2023-08-21 PROCEDURE — 25000003 PHARM REV CODE 250: Performed by: STUDENT IN AN ORGANIZED HEALTH CARE EDUCATION/TRAINING PROGRAM

## 2023-08-21 RX ORDER — LIDOCAINE HYDROCHLORIDE 20 MG/ML
INJECTION INTRAVENOUS
Status: DISCONTINUED | OUTPATIENT
Start: 2023-08-21 | End: 2023-08-21

## 2023-08-21 RX ORDER — SODIUM CHLORIDE 9 MG/ML
INJECTION, SOLUTION INTRAVENOUS CONTINUOUS PRN
Status: DISCONTINUED | OUTPATIENT
Start: 2023-08-21 | End: 2023-08-21

## 2023-08-21 RX ORDER — VASOPRESSIN 20 [USP'U]/ML
INJECTION, SOLUTION INTRAMUSCULAR; SUBCUTANEOUS
Status: DISCONTINUED | OUTPATIENT
Start: 2023-08-21 | End: 2023-08-21

## 2023-08-21 RX ORDER — CARVEDILOL 25 MG/1
25 TABLET ORAL 2 TIMES DAILY
Status: DISCONTINUED | OUTPATIENT
Start: 2023-08-21 | End: 2023-08-21

## 2023-08-21 RX ORDER — ETOMIDATE 2 MG/ML
INJECTION INTRAVENOUS
Status: DISCONTINUED | OUTPATIENT
Start: 2023-08-21 | End: 2023-08-21

## 2023-08-21 RX ORDER — SODIUM CHLORIDE 9 MG/ML
INJECTION, SOLUTION INTRAVENOUS ONCE
Status: COMPLETED | OUTPATIENT
Start: 2023-08-22 | End: 2023-08-22

## 2023-08-21 RX ORDER — PROPOFOL 10 MG/ML
VIAL (ML) INTRAVENOUS
Status: DISCONTINUED | OUTPATIENT
Start: 2023-08-21 | End: 2023-08-21

## 2023-08-21 RX ORDER — CARVEDILOL 12.5 MG/1
12.5 TABLET ORAL 2 TIMES DAILY
Status: DISCONTINUED | OUTPATIENT
Start: 2023-08-21 | End: 2023-08-22 | Stop reason: HOSPADM

## 2023-08-21 RX ORDER — LIDOCAINE 50 MG/G
OINTMENT TOPICAL
Status: DISCONTINUED | OUTPATIENT
Start: 2023-08-21 | End: 2023-08-21

## 2023-08-21 RX ORDER — PROPOFOL 10 MG/ML
VIAL (ML) INTRAVENOUS CONTINUOUS PRN
Status: DISCONTINUED | OUTPATIENT
Start: 2023-08-21 | End: 2023-08-21

## 2023-08-21 RX ADMIN — DORZOLAMIDE 1 DROP: 20 SOLUTION/ DROPS OPHTHALMIC at 11:08

## 2023-08-21 RX ADMIN — TIMOLOL MALEATE 1 DROP: 5 SOLUTION/ DROPS OPHTHALMIC at 11:08

## 2023-08-21 RX ADMIN — DORZOLAMIDE 1 DROP: 20 SOLUTION/ DROPS OPHTHALMIC at 10:08

## 2023-08-21 RX ADMIN — PROPOFOL 50 MCG/KG/MIN: 10 INJECTION, EMULSION INTRAVENOUS at 08:08

## 2023-08-21 RX ADMIN — NEPHROCAP 1 CAPSULE: 1 CAP ORAL at 11:08

## 2023-08-21 RX ADMIN — CARVEDILOL 12.5 MG: 12.5 TABLET, FILM COATED ORAL at 10:08

## 2023-08-21 RX ADMIN — PANTOPRAZOLE SODIUM 40 MG: 40 INJECTION, POWDER, FOR SOLUTION INTRAVENOUS at 11:08

## 2023-08-21 RX ADMIN — CALCIUM ACETATE 667 MG: 667 CAPSULE ORAL at 05:08

## 2023-08-21 RX ADMIN — DEXTROSE MONOHYDRATE 10 MILLION UNITS: 5 INJECTION, SOLUTION INTRAVENOUS at 06:08

## 2023-08-21 RX ADMIN — TIMOLOL MALEATE 1 DROP: 5 SOLUTION/ DROPS OPHTHALMIC at 10:08

## 2023-08-21 RX ADMIN — GUAIFENESIN AND DEXTROMETHORPHAN HYDROBROMIDE 1 TABLET: 600; 30 TABLET, EXTENDED RELEASE ORAL at 11:08

## 2023-08-21 RX ADMIN — SODIUM CHLORIDE: 0.9 INJECTION, SOLUTION INTRAVENOUS at 08:08

## 2023-08-21 RX ADMIN — VASOPRESSIN 1 UNITS: 20 INJECTION INTRAVENOUS at 08:08

## 2023-08-21 RX ADMIN — LIDOCAINE HYDROCHLORIDE 40 MG: 20 INJECTION INTRAVENOUS at 08:08

## 2023-08-21 RX ADMIN — APIXABAN 2.5 MG: 2.5 TABLET, FILM COATED ORAL at 10:08

## 2023-08-21 RX ADMIN — PROPOFOL 20 MG: 10 INJECTION, EMULSION INTRAVENOUS at 08:08

## 2023-08-21 RX ADMIN — ATORVASTATIN CALCIUM 40 MG: 40 TABLET, FILM COATED ORAL at 10:08

## 2023-08-21 RX ADMIN — GUAIFENESIN AND DEXTROMETHORPHAN HYDROBROMIDE 1 TABLET: 600; 30 TABLET, EXTENDED RELEASE ORAL at 10:08

## 2023-08-21 RX ADMIN — ETOMIDATE 6 MG: 2 INJECTION INTRAVENOUS at 08:08

## 2023-08-21 NOTE — ASSESSMENT & PLAN NOTE
Anemia of chronic renal failure    -FOBT positive at [facility] ED, s/p 2unit PRBC prior to transfer  -CBC TID  -Transfuse prn for Hgb >2 (2 Units here thus far)  -CP with improvement but ongoing generalized weakness  -pt with ESRD has anemia related to CKD also   -S/p EGD/colonoscopy on 8/21; no active bleed identifed  -Resume A/C, stop PPI  -Monitor Hgb overnight for Hgb stability

## 2023-08-21 NOTE — TRANSFER OF CARE
"Anesthesia Transfer of Care Note    Patient: Margaret Hernández Jr.    Procedure(s) Performed: Procedure(s) (LRB):  EGD (ESOPHAGOGASTRODUODENOSCOPY) (N/A)  COLONOSCOPY (N/A)    Patient location: GI    Anesthesia Type: general    Transport from OR: Transported from OR on 2-3 L/min O2 by NC with adequate spontaneous ventilation    Post pain: adequate analgesia    Post assessment: no apparent anesthetic complications and tolerated procedure well    Post vital signs: stable    Level of consciousness: awake, alert and oriented    Nausea/Vomiting: no nausea/vomiting    Complications: none    Transfer of care protocol was followed      Last vitals:   Visit Vitals  BP (!) 112/42   Pulse 61   Temp 36.2 °C (97.1 °F) (Temporal)   Resp 12   Ht 5' 7" (1.702 m)   Wt 60.5 kg (133 lb 6.1 oz)   SpO2 100%   BMI 20.89 kg/m²     "

## 2023-08-21 NOTE — SUBJECTIVE & OBJECTIVE
Interval History: No acute overnight events. Patient in endoscopy this AM which did not demonstrate any source of bleeding. Hgb 7.5 this morning. TTE pending.    Review of Systems   Constitutional: Negative for chills, decreased appetite, diaphoresis, fever, malaise/fatigue and weight gain.   HENT:  Negative for congestion and ear discharge.    Eyes:  Negative for blurred vision.   Cardiovascular:  Negative for chest pain, dyspnea on exertion, irregular heartbeat, leg swelling, orthopnea, palpitations and paroxysmal nocturnal dyspnea.   Respiratory:  Negative for cough, shortness of breath, snoring and sputum production.    Skin:  Negative for color change, dry skin and rash.   Musculoskeletal:  Negative for back pain, falls, joint pain and neck pain.   Gastrointestinal:  Negative for bloating, abdominal pain, constipation and diarrhea.   Genitourinary:  Negative for dysuria, flank pain, hematuria and hesitancy.   Neurological:  Negative for focal weakness, headaches, numbness and seizures.   Psychiatric/Behavioral:  Negative for altered mental status, depression and hallucinations.      Objective:     Vital Signs (Most Recent):  Temp: 98.3 °F (36.8 °C) (08/21/23 0742)  Pulse: 68 (08/21/23 0742)  Resp: 18 (08/21/23 0742)  BP: (!) 189/80 (08/21/23 0742)  SpO2: 96 % (08/21/23 0742) Vital Signs (24h Range):  Temp:  [97.5 °F (36.4 °C)-98.3 °F (36.8 °C)] 98.3 °F (36.8 °C)  Pulse:  [66-82] 68  Resp:  [16-20] 18  SpO2:  [96 %-100 %] 96 %  BP: (119-189)/(48-81) 189/80     Weight: 60.5 kg (133 lb 6.1 oz)  Body mass index is 20.89 kg/m².     SpO2: 96 %         Intake/Output Summary (Last 24 hours) at 8/21/2023 0839  Last data filed at 8/21/2023 0306  Gross per 24 hour   Intake 2125.83 ml   Output --   Net 2125.83 ml       Lines/Drains/Airways       Peripherally Inserted Central Catheter Line  Duration             PICC Single Lumen 08/08/23 1421 right basilic 12 days              Central Venous Catheter Line  Duration                   Hemodialysis AV Graft Left upper arm -- days              Peripheral Intravenous Line  Duration                  Hemodialysis AV Fistula 03/27/23 1911 Left upper arm 146 days         Peripheral IV - Single Lumen 08/18/23 0905 18 G Anterior;Right Forearm 2 days                       Physical Exam  Constitutional:       General: He is not in acute distress.     Appearance: He is not ill-appearing.   HENT:      Nose: Nose normal.      Mouth/Throat:      Mouth: Mucous membranes are moist.   Eyes:      Pupils: Pupils are equal, round, and reactive to light.   Neck:      Comments: No JVD appreciated   Cardiovascular:      Rate and Rhythm: Normal rate and regular rhythm.      Pulses: Normal pulses.      Heart sounds: Murmur (Grade 2/6 systolic crescendo-decrescendo murmur w/ radiation to carotids) heard.   Pulmonary:      Effort: Pulmonary effort is normal. No respiratory distress.      Breath sounds: No wheezing or rales.   Abdominal:      General: Abdomen is flat. There is no distension.      Palpations: Abdomen is soft.      Tenderness: There is no right CVA tenderness or left CVA tenderness.   Musculoskeletal:         General: No swelling.      Cervical back: Normal range of motion and neck supple. No tenderness.      Right lower leg: No edema.      Left lower leg: No edema.      Comments: LUE AV fistula with good thrill   Skin:     General: Skin is warm.      Coloration: Skin is not pale.      Findings: No rash.   Neurological:      General: No focal deficit present.      Mental Status: He is alert and oriented to person, place, and time.      Motor: No weakness.         Significant Labs: CMP   Recent Labs   Lab 08/20/23  0542 08/20/23  1431 08/21/23  0402   * 135* 136   K 5.6* 5.0 5.0    99 95   CO2 24 21* 21*   * 162* 115*   BUN 74* 77* 83*   CREATININE 6.5* 7.4* 7.6*   CALCIUM 9.0 9.1 9.0   ANIONGAP 10 15 20*    and CBC   Recent Labs   Lab 08/20/23  1430 08/20/23  2208 08/21/23  0402    WBC 17.16* 13.95*  13.95* 10.32   HGB 7.2* 8.2*  8.2* 7.5*   HCT 21.9* 25.1*  25.1* 22.8*   * 190  190 156

## 2023-08-21 NOTE — TREATMENT PLAN
GI Post Procedure Treatment Plan  Procedure Performed: EGD/Colonoscopy     EGD  Impression:    - Normal esophagus.                          - Z-line regular, 38 cm from the incisors.                          - Three gastric polyps.                          - Normal examined duodenum.                          - No specimens collected.     Colonoscopy  Impression:    - Diverticulosis in the ascending colon.                          - Several 2 to 8 mm, non-bleeding polyps in the                          entire colon. Resection not attempted.                          - The examined portion of the ileum was normal.                          - No specimens collected.     Recommendations:                                 - Return pt to hospital call for ongoing care.                          - Advance diet as tolerated.                          - Continue present medications.                          - Resume Eliquis (apixaban) at prior dose today.                          - No recommendation regarding repeat                          colonoscopy.                          - Monitor blood counts overnight. If stable, to                          visualize the small bowel, perform outpatient                          video capsule endoscopy.                          - Makt will need follow-up in GI clinic at some                          point (will arrange)    Aydee Adams MD  Gastroenterology, PGY-4

## 2023-08-21 NOTE — PLAN OF CARE
Patient is not stable to dc, patient expected to dc home with family when stable. Patient is presently getting Dialysis at [facility] M/W/F @6am.           Katheryn Galo LMSW  [facility]   [id number]

## 2023-08-21 NOTE — PLAN OF CARE
Problem: Adult Inpatient Plan of Care  Goal: Plan of Care Review  Outcome: Ongoing, Progressing  Goal: Patient-Specific Goal (Individualized)  Outcome: Ongoing, Progressing  Goal: Absence of Hospital-Acquired Illness or Injury  Outcome: Ongoing, Progressing  Goal: Optimal Comfort and Wellbeing  Outcome: Ongoing, Progressing  Goal: Readiness for Transition of Care  Outcome: Ongoing, Progressing     No acute events. Alert and oriented. SBA to bedside commode. Transferred to [facility] this morning for EGD/colonoscopy. Echo completed at bedside. Plan for HD tomorrow. Elevated BP, 130-150s/50-60s. HR aflutter, 70s. No episodes of melena today. Last hgb 8.0. Family visiting today. Patient upgraded to regular diet. Denies CP or SOB; on room air, sats >95%. C/o intermittent mild left-sided headache this morning. Plan of care reviewed with patient.

## 2023-08-21 NOTE — ASSESSMENT & PLAN NOTE
S/p DCC 04/2023. Currently sinus rhythm. On carvedilol 12.5 mg BID at home.    -- Resume coreg once stable from GI bleed  -- AC with Eliquis 2.5 mg PO BID (age, Cr) when stable from GI bleed perspective

## 2023-08-21 NOTE — ASSESSMENT & PLAN NOTE
-patient in sinus rhythm-rate controlled., continue cardiac monitoring and coreg  -as per GI, can resume apixaban

## 2023-08-21 NOTE — PROGRESS NOTES
Assumed care of patient. Patient had small clear BM at start of shift. Transferred to endo @ 0754 via stretcher. On tele box. Vitals recorded prior to transfer.

## 2023-08-21 NOTE — ASSESSMENT & PLAN NOTE
Patient is an 83 year old male with anemia, ESRD on HD, HFrEF( most recent EF 30%), atrial flutter on Eliquis status post DCCV April 2023, neurosyphilis, HLD, HTN presented to [facility] on August 18 with chest discomfort. Transferred to [place] with Cardiology consultation, and then transferred to [facility] for higher level of care. ECG 08/18/2023 at 6 AM independently reviewed, showed diffuse ST depressions in the anterior, inferior, and lateral leads with aVR elevation. ECG 08/19/2023 independently reviewed, showed NST, 1st degree AV block, and complete resolution of ST-T changes. Patient continues to report being angina free. Patient had troponin elevation 0.094 --> 2.88--> 3.79 --> 4.21. While biomarker elevation is concerning for underlying coronary artery disease, this presentation is not consistent with acute plaque rupture. This presentation is likely a Type II MI secondary to demand ischemia from acute blood loss anemia (Hgb 6.1 g/dL at OSH). GI upper and lower endoscopy 8/21 without evidence of GI bleed. Unclear down-trend in Hgb, would continue to monitor.    - Trend Hgb  - Echo was already ordered, will follow  - Recommend continuing anti-anginal therapy with beta - blocker  - Patient is already at target LDL < 70 mg/dL (currently 58 mg/dL) without statin  - Consider isosorbide mononitrate if anginal symptoms worsen and BP tolerates

## 2023-08-21 NOTE — ASSESSMENT & PLAN NOTE
Patient's FSGs are controlled on current medication regimen.  Last A1c reviewed-   Lab Results   Component Value Date    HGBA1C 5.1 08/19/2023     Most recent fingerstick glucose reviewed-   Recent Labs   Lab 08/20/23  2210 08/21/23  1038   POCTGLUCOSE 148* 124*     Current correctional scale  Low  Maintain anti-hyperglycemic dose as follows-   Antihyperglycemics (From admission, onward)    Start     Stop Route Frequency Ordered    08/18/23 2211  insulin aspart U-100 pen 0-5 Units         -- SubQ Before meals & nightly PRN 08/18/23 2111        Hold Oral hypoglycemics while patient is in the hospital.

## 2023-08-21 NOTE — PROGRESS NOTES
[facility] - [facility] Care ([address] Medicine  Progress Note    Patient Name: Margaret Hernández Jr.  MRN: [id number]  Patient Class: IP- Inpatient   Admission Date: 8/18/2023  Length of Stay: 3 days  Attending Physician: Marsha Dixon MD  Primary Care Provider: Stephanie Hernandez NP        Subjective:     Principal Problem:Acute blood loss anemia        HPI:  TRANSFER CENTER PHYSICIAN SUMMARY  83-year-old male with a history of anemia, end-stage renal disease on hemodialysis (most recent dialysis on August 16), HFrEF, atrial flutter (on Eliquis) with DCCV, syphilis (treated in 2016), hyperlipidemia, hypertension, glaucoma, and recent admission to [facility] for 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  No diaphoresis, dyspnea, or nausea/vomiting noted.  EKG had ST elevation in AVR with depressions in inferior, anterior, and lateral leads.  Findings were felt to be consistent with STEMI.  He received sublingual nitroglycerin, aspirin, ticagrelor, and heparin bolus/infusion.  He was subsequently transferred to the [facility] ED.     At [facility] - He was seen by Cardiology in the emergency department, and it was noted that his hemoglobin was 6.1.  It was felt that his findings were more likely a type 2 NSTEMI.  Heparin was discontinued, and 2 units packed red blood cells were given.  Troponin levels were 0.064 with repeat 0.094.  Stool for occult blood was positive, and he did report dark stool recently.  He is having intermittent chest pain but appears comfortable. In the emergency department he received sublingual nitroglycerin and IV morphine. With the symptomatic anemia, cardiac issues, and heme positive stool, ED spoke with Cardiology at [facility].  After reviewing the case was felt that urgent cardiac catheterization was not indicated.  Will plan transfer to  "Hospital Medicine at [facility] in step-down status for further treatment of symptomatic anemia/possible GI bleed.     Previous admissions this year at outside hospitals:  -March 1-2 with concern for temporal arteritis.  Treated with steroids with plans for temporal artery biopsy.  He subsequently had outpatient temporal artery biopsy on March 7 that had no significant increase in inflammation.  He was in the process of weaning off steroids.    -March 27-29 with atrial flutter treated medically.  -April 11 for elective cardioversion of atrial flutter.  He had no intracardiac thrombus on WERNER, and he was cardioverted to sinus rhythm.  He was treated with Eliquis.  -April 26-27 with chest pain/NSTEMI due showed FT depression in leads I, V4, V5, and V6 with ST elevation in AVR.  It was noted that these findings were seen on previous EKGs.  Hemoglobin was noted to be 8.3, and he received packed red blood cells with dialysis.  -April 8-10 with concern for neurosyphilis.  Infectious Diseases recommended lumbar puncture and PICC line placement for IV penicillin to treat suspected neurosyphilis.  He had lumbar puncture on August 8.  Recommendation was for 14 days of IV penicillin (end date around August 22).       BEDSIDE EVAL    Patient seen these evening, he is in bed in no distress, reports no acute complaints, reviewed events of today and plan for hospitalization.  He received 2 units PRBC prior to arrival - he is on 2L NC which is new.  At bedside removed Oxygen since patient on continuous bedside monitor initially sats no lower than 95 but patient visibly uncomfortable after oxygen removed, asked to sit up and then be reclined, stated he felt tighntess or "cold" in his throat.   His chest pain he initially presented with is improved but not fully resolved, reports symptom as minor.     He reports seeing dark/black stools in recent days, denies any hematemesis, no abdominal pain, taking eliquis as outpatient.  " Last dose of Eliquis was Wed 8/16    He denies prior hx of MI or any prior stent placements.   Repeat EKG is obtained and his ST depressions have resolved, sTAT labs obtained and Potassium is 5.8,  T-waves more hyperacute than last EKG performed today.  NO St elevations noted.  Patient lives with wife, performs all ADL at home, does not use assistive device, not working currenty.  Denies any Tobacco/ETOH/Drug use.       Overview/Hospital Course:  Transfer from OSH evaluation in setting of acute blood loss anemia associated with demand ischemia.  Chest pain with resolution following PRBC transfusions.  Troponin with peak.  Per cardio evaluation, echo, stop anti thrombotic and anti-platelet therapy.  GDMT as tolerated. Hgb w/ ongoing downtrend in setting of ongoing melena. Required PRBC transfusions. S/p EGD and C-scope w/ GI 8/21. Notable for diverticulosis in ascending colon, nonbleeding polyps in entire colon and resected.  Eliquis resumed.      Interval History:  Patient seen and examined at bedside.    Returning from scopes.   NAEO. Doing well this AM. Just slight HA.   Denies any shortness of breath, chest pain.   No melena this AM.   Discussed scope findings with GI; will resume A/C and monitor Hgb overnight.   Patient updated regarding care plan.      Review of Systems   Constitutional:  Positive for activity change, appetite change and fatigue. Negative for chills.   HENT:  Positive for congestion. Negative for sore throat and trouble swallowing.    Respiratory:  Positive for cough. Negative for shortness of breath.    Cardiovascular:  Negative for palpitations and leg swelling.   Gastrointestinal:  Negative for abdominal pain, nausea and vomiting.   Skin:  Negative for rash.   Neurological:  Positive for weakness. Negative for headaches.   Psychiatric/Behavioral: Negative.         Objective:    Temp: 97.5 °F (36.4 °C) (08/21/23 1138)  Pulse: 73 (08/21/23 1153)  Resp: 16 (08/21/23 1015)  BP: (!) 132/54  (08/21/23 1138)  SpO2: 99 % (08/21/23 1138)    Weight: 60.5 kg (133 lb 6.1 oz) (08/21/23 0500)    Body mass index is 20.89 kg/m².    Physical Exam  Vitals and nursing note reviewed.   Constitutional:       Appearance: He is not toxic-appearing or diaphoretic.      Interventions: Nasal cannula in place.      Comments: RUE PICC LINE   HENT:      Head: Normocephalic and atraumatic.      Mouth/Throat:      Mouth: Mucous membranes are moist.      Pharynx: Oropharynx is clear.   Eyes:      General: No scleral icterus.        Right eye: No discharge.         Left eye: No discharge.   Cardiovascular:      Rate and Rhythm: Normal rate.      Pulses: Normal pulses.      Heart sounds: Murmur heard.      Comments: LUE AVF with thrill/bruit  Pulmonary:      Effort: Pulmonary effort is normal. No respiratory distress.      Breath sounds: Normal breath sounds. No wheezing or rales.   Abdominal:      General: Bowel sounds are normal. There is no distension.      Palpations: Abdomen is soft.      Tenderness: There is no abdominal tenderness. There is no guarding or rebound.   Musculoskeletal:      Cervical back: Neck supple. No tenderness.      Right lower leg: No edema.      Left lower leg: No edema.   Lymphadenopathy:      Cervical: No cervical adenopathy.   Skin:     General: Skin is warm.      Findings: No rash.   Neurological:      General: No focal deficit present.      Mental Status: He is alert and oriented to person, place, and time.   Psychiatric:         Behavior: Behavior normal.         Significant Labs: All pertinent labs within the past 24 hours have been reviewed.    Recent Results (from the past 24 hour(s))   CBC auto differential    Collection Time: 08/20/23  2:30 PM   Result Value Ref Range    WBC 17.16 (H) 3.90 - 12.70 K/uL    RBC 2.38 (L) 4.60 - 6.20 M/uL    Hemoglobin 7.2 (L) 14.0 - 18.0 g/dL    Hematocrit 21.9 (L) 40.0 - 54.0 %    MCV 92 82 - 98 fL    MCH 30.3 27.0 - 31.0 pg    MCHC 32.9 32.0 - 36.0 g/dL    RDW  17.6 (H) 11.5 - 14.5 %    Platelets 148 (L) 150 - 450 K/uL    MPV 11.0 9.2 - 12.9 fL    Immature Granulocytes 1.1 (H) 0.0 - 0.5 %    Gran # (ANC) 13.6 (H) 1.8 - 7.7 K/uL    Immature Grans (Abs) 0.19 (H) 0.00 - 0.04 K/uL    Lymph # 1.2 1.0 - 4.8 K/uL    Mono # 1.9 (H) 0.3 - 1.0 K/uL    Eos # 0.3 0.0 - 0.5 K/uL    Baso # 0.04 0.00 - 0.20 K/uL    nRBC 0 0 /100 WBC    Gran % 79.1 (H) 38.0 - 73.0 %    Lymph % 7.0 (L) 18.0 - 48.0 %    Mono % 11.0 4.0 - 15.0 %    Eosinophil % 1.6 0.0 - 8.0 %    Basophil % 0.2 0.0 - 1.9 %    Differential Method Automated    Basic metabolic panel    Collection Time: 08/20/23  2:31 PM   Result Value Ref Range    Sodium 135 (L) 136 - 145 mmol/L    Potassium 5.0 3.5 - 5.1 mmol/L    Chloride 99 95 - 110 mmol/L    CO2 21 (L) 23 - 29 mmol/L    Glucose 162 (H) 70 - 110 mg/dL    BUN 77 (H) 8 - 23 mg/dL    Creatinine 7.4 (H) 0.5 - 1.4 mg/dL    Calcium 9.1 8.7 - 10.5 mg/dL    Anion Gap 15 8 - 16 mmol/L    eGFR 6.7 (A) >60 mL/min/1.73 m^2   CBC auto differential    Collection Time: 08/20/23 10:08 PM   Result Value Ref Range    WBC 13.95 (H) 3.90 - 12.70 K/uL    RBC 2.77 (L) 4.60 - 6.20 M/uL    Hemoglobin 8.2 (L) 14.0 - 18.0 g/dL    Hematocrit 25.1 (L) 40.0 - 54.0 %    MCV 91 82 - 98 fL    MCH 29.6 27.0 - 31.0 pg    MCHC 32.7 32.0 - 36.0 g/dL    RDW 17.7 (H) 11.5 - 14.5 %    Platelets 190 150 - 450 K/uL    MPV 11.1 9.2 - 12.9 fL    Immature Granulocytes 0.4 0.0 - 0.5 %    Gran # (ANC) 10.6 (H) 1.8 - 7.7 K/uL    Immature Grans (Abs) 0.06 (H) 0.00 - 0.04 K/uL    Lymph # 1.2 1.0 - 4.8 K/uL    Mono # 1.7 (H) 0.3 - 1.0 K/uL    Eos # 0.3 0.0 - 0.5 K/uL    Baso # 0.03 0.00 - 0.20 K/uL    nRBC 0 0 /100 WBC    Gran % 76.2 (H) 38.0 - 73.0 %    Lymph % 8.7 (L) 18.0 - 48.0 %    Mono % 12.1 4.0 - 15.0 %    Eosinophil % 2.4 0.0 - 8.0 %    Basophil % 0.2 0.0 - 1.9 %    Differential Method Automated    CBC auto differential    Collection Time: 08/20/23 10:08 PM   Result Value Ref Range    WBC 13.95 (H) 3.90 - 12.70  K/uL    RBC 2.77 (L) 4.60 - 6.20 M/uL    Hemoglobin 8.2 (L) 14.0 - 18.0 g/dL    Hematocrit 25.1 (L) 40.0 - 54.0 %    MCV 91 82 - 98 fL    MCH 29.6 27.0 - 31.0 pg    MCHC 32.7 32.0 - 36.0 g/dL    RDW 17.7 (H) 11.5 - 14.5 %    Platelets 190 150 - 450 K/uL    MPV 11.1 9.2 - 12.9 fL    Immature Granulocytes 0.4 0.0 - 0.5 %    Gran # (ANC) 10.6 (H) 1.8 - 7.7 K/uL    Immature Grans (Abs) 0.06 (H) 0.00 - 0.04 K/uL    Lymph # 1.2 1.0 - 4.8 K/uL    Mono # 1.7 (H) 0.3 - 1.0 K/uL    Eos # 0.3 0.0 - 0.5 K/uL    Baso # 0.03 0.00 - 0.20 K/uL    nRBC 0 0 /100 WBC    Gran % 76.2 (H) 38.0 - 73.0 %    Lymph % 8.7 (L) 18.0 - 48.0 %    Mono % 12.1 4.0 - 15.0 %    Eosinophil % 2.4 0.0 - 8.0 %    Basophil % 0.2 0.0 - 1.9 %    Differential Method Automated    POCT glucose    Collection Time: 08/20/23 10:10 PM   Result Value Ref Range    POCT Glucose 148 (H) 70 - 110 mg/dL   Basic metabolic panel    Collection Time: 08/21/23  4:02 AM   Result Value Ref Range    Sodium 136 136 - 145 mmol/L    Potassium 5.0 3.5 - 5.1 mmol/L    Chloride 95 95 - 110 mmol/L    CO2 21 (L) 23 - 29 mmol/L    Glucose 115 (H) 70 - 110 mg/dL    BUN 83 (H) 8 - 23 mg/dL    Creatinine 7.6 (H) 0.5 - 1.4 mg/dL    Calcium 9.0 8.7 - 10.5 mg/dL    Anion Gap 20 (H) 8 - 16 mmol/L    eGFR 6.5 (A) >60 mL/min/1.73 m^2   Magnesium    Collection Time: 08/21/23  4:02 AM   Result Value Ref Range    Magnesium 2.2 1.6 - 2.6 mg/dL   Phosphorus    Collection Time: 08/21/23  4:02 AM   Result Value Ref Range    Phosphorus 5.2 (H) 2.7 - 4.5 mg/dL   CBC auto differential    Collection Time: 08/21/23  4:02 AM   Result Value Ref Range    WBC 10.32 3.90 - 12.70 K/uL    RBC 2.53 (L) 4.60 - 6.20 M/uL    Hemoglobin 7.5 (L) 14.0 - 18.0 g/dL    Hematocrit 22.8 (L) 40.0 - 54.0 %    MCV 90 82 - 98 fL    MCH 29.6 27.0 - 31.0 pg    MCHC 32.9 32.0 - 36.0 g/dL    RDW 17.4 (H) 11.5 - 14.5 %    Platelets 156 150 - 450 K/uL    MPV 10.9 9.2 - 12.9 fL    Immature Granulocytes 0.3 0.0 - 0.5 %    Gran # (ANC)  7.9 (H) 1.8 - 7.7 K/uL    Immature Grans (Abs) 0.03 0.00 - 0.04 K/uL    Lymph # 0.9 (L) 1.0 - 4.8 K/uL    Mono # 1.2 (H) 0.3 - 1.0 K/uL    Eos # 0.3 0.0 - 0.5 K/uL    Baso # 0.03 0.00 - 0.20 K/uL    nRBC 0 0 /100 WBC    Gran % 76.5 (H) 38.0 - 73.0 %    Lymph % 8.9 (L) 18.0 - 48.0 %    Mono % 11.5 4.0 - 15.0 %    Eosinophil % 2.5 0.0 - 8.0 %    Basophil % 0.3 0.0 - 1.9 %    Differential Method Automated    CBC auto differential    Collection Time: 08/21/23 10:32 AM   Result Value Ref Range    WBC 11.48 3.90 - 12.70 K/uL    RBC 2.71 (L) 4.60 - 6.20 M/uL    Hemoglobin 8.0 (L) 14.0 - 18.0 g/dL    Hematocrit 24.6 (L) 40.0 - 54.0 %    MCV 91 82 - 98 fL    MCH 29.5 27.0 - 31.0 pg    MCHC 32.5 32.0 - 36.0 g/dL    RDW 17.7 (H) 11.5 - 14.5 %    Platelets 180 150 - 450 K/uL    MPV 11.5 9.2 - 12.9 fL    Immature Granulocytes 0.3 0.0 - 0.5 %    Gran # (ANC) 8.5 (H) 1.8 - 7.7 K/uL    Immature Grans (Abs) 0.04 0.00 - 0.04 K/uL    Lymph # 1.2 1.0 - 4.8 K/uL    Mono # 1.4 (H) 0.3 - 1.0 K/uL    Eos # 0.3 0.0 - 0.5 K/uL    Baso # 0.04 0.00 - 0.20 K/uL    nRBC 0 0 /100 WBC    Gran % 74.4 (H) 38.0 - 73.0 %    Lymph % 10.4 (L) 18.0 - 48.0 %    Mono % 12.1 4.0 - 15.0 %    Eosinophil % 2.5 0.0 - 8.0 %    Basophil % 0.3 0.0 - 1.9 %    Differential Method Automated    POCT glucose    Collection Time: 08/21/23 10:38 AM   Result Value Ref Range    POCT Glucose 124 (H) 70 - 110 mg/dL       Significant Imaging: I have reviewed all pertinent imaging results/findings within the past 24 hours.            Assessment/Plan:      * Acute blood loss anemia  Anemia of chronic renal failure    -FOBT positive at [facility] ED, s/p 2unit PRBC prior to transfer  -CBC TID  -Transfuse prn for Hgb >2 (2 Units here thus far)  -CP with improvement but ongoing generalized weakness  -pt with ESRD has anemia related to CKD also   -S/p EGD/colonoscopy on 8/21; no active bleed identifed  -Resume A/C, stop PPI  -Monitor Hgb overnight for Hgb stability      Acute  respiratory failure with hypoxia  Patient with Hypoxic Respiratory failure which is Acute.  he is not on home oxygen. Supplemental oxygen was provided and noted-      .   Signs/symptoms of respiratory failure include- tachypnea and increased work of breathing. Contributing diagnoses includes - CHF and ESRD and Recent PRBC transfusion Labs and images were reviewed. Patient Has not had a recent ABG. Will treat underlying causes and adjust management of respiratory failure as follows-     · Obtained repeat CXR on arrival: central vascular congestion.  No significant change in cardiopulmonary status, noting continued mild increased perihilar interstitial attenuation, similar to prior examination.  No new large confluent airspace consolidation or pneumothorax identified  · Continue supplemental oxygen - pt symptomatic without it, sats down to 93% after 10min of O2 removal.   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   -PICC line placed for continuous home infusion Pen G 8million units/daily End date is 08/23/23. Appreciate clinical pharmD assistance. Continue Pen G 10million units continuous infusion.      HFrEF (heart failure with reduced ejection fraction)  Chronic stable  -continue Diuretics Lasix on non-HD days - tue/thu/sat.   Pending response with GI bleed w/u and NSTEMI - change to a BID(Tue/Thu/Sat/Sun) frequency per home med rec dosing.    Atrial flutter  -patient in sinus rhythm-rate controlled., continue cardiac monitoring and coreg  -as per GI, can resume apixaban      Type II NSTEMI (demand ischemia)  Etiology of Type 2 MI thought to be secondary to Symptomatic anemia  --Trend Troponin here 2.884->3.79->4.207->3.554  -EKG prn for chest pain  -Continue outpatient Coreg BID  -Echo pending  -Appreciate  cardiology eval    Type 2 diabetes mellitus with chronic kidney disease on chronic dialysis, without long-term current use of insulin  Patient's FSGs are controlled on current medication regimen.  Last A1c reviewed-   Lab Results   Component Value Date    HGBA1C 5.1 08/19/2023     Most recent fingerstick glucose reviewed-   Recent Labs   Lab 08/20/23  2210 08/21/23  1038   POCTGLUCOSE 148* 124*     Current correctional scale  Low  Maintain anti-hyperglycemic dose as follows-   Antihyperglycemics (From admission, onward)    Start     Stop Route Frequency Ordered    08/18/23 2211  insulin aspart U-100 pen 0-5 Units         -- SubQ Before meals & nightly PRN 08/18/23 2111        Hold Oral hypoglycemics while patient is in the hospital.    ESRD on dialysis  Last HD reported in EMR as on 8/16,  Missed scheduled dialysis today  -continue relevant home medications  -Consult Nephrology for continued inpatient HD treatments        Primary hypertension  chornic  -Continue Carvedilol at lower dosing in setting of GI bleed and soft bPs        VTE Risk Mitigation (From admission, onward)         Ordered     apixaban tablet 2.5 mg  2 times daily         08/21/23 1259     heparin (porcine) injection 1,000 Units  As needed (PRN)         08/19/23 0718     Reason for No Pharmacological VTE Prophylaxis  Once        Question:  Reasons:  Answer:  Risk of Bleeding    08/18/23 2104     IP VTE HIGH RISK PATIENT  Once         08/18/23 2104     Place sequential compression device  Until discontinued         08/18/23 2104                Discharge Planning   GABE: 8/22/2023     Code Status: Full Code   Is the patient medically ready for discharge?: No    Reason for patient still in hospital (select all that apply): Patient trending condition, Laboratory test and Pending disposition  Discharge Plan A: Home, Home with family   Discharge Delays: None known at this time              Marsha Dixon MD  Department of Hospital Medicine   [facility] -  Intensive Care ([facility]-)

## 2023-08-21 NOTE — ANESTHESIA POSTPROCEDURE EVALUATION
Anesthesia Post Evaluation    Patient: Margaret Hernández Jr.    Procedure(s) Performed: Procedure(s) (LRB):  EGD (ESOPHAGOGASTRODUODENOSCOPY) (N/A)  COLONOSCOPY (N/A)    Final Anesthesia Type: general      Patient location during evaluation: PACU  Patient participation: Yes- Able to Participate  Level of consciousness: awake and alert  Post-procedure vital signs: reviewed and stable  Pain management: adequate  Airway patency: patent    PONV status at discharge: No PONV  Anesthetic complications: no      Cardiovascular status: blood pressure returned to baseline  Respiratory status: unassisted, spontaneous ventilation and room air  Hydration status: euvolemic            Vitals Value Taken Time   /65 08/21/23 1015   Temp 36.1 °C (97 °F) 08/21/23 0945   Pulse 70 08/21/23 1015   Resp 16 08/21/23 1015   SpO2 100 % 08/21/23 1015         Event Time   Out of Recovery 10:02:41         Pain/Guerline Score: Guerline Score: 9 (8/21/2023  9:30 AM)

## 2023-08-21 NOTE — PROVATION PATIENT INSTRUCTIONS
Discharge Summary/Instructions after an Endoscopic Procedure  Patient Name: Margaret Hernández  Patient MRN: [id number]  Patient YOB: 1940 Monday, August 21, 2023  Dixon Douglas MD  Dear patient,  As a result of recent federal legislation (The Federal Cures Act), you may   receive lab or pathology results from your procedure in your [facility]   account before your physician is able to contact you. Your physician or   their representative will relay the results to you with their   recommendations at their soonest availability.  Thank you,  RESTRICTIONS:  During your procedure today, you received medications for sedation.  These   medications may affect your judgment, balance and coordination.  Therefore,   for 24 hours, you have the following restrictions:   - DO NOT drive a car, operate machinery, make legal/financial decisions,   sign important papers or drink alcohol.    ACTIVITY:  Today: no heavy lifting, straining or running due to procedural   sedation/anesthesia.  The following day: return to full activity including work.  DIET:  Eat and drink normally unless instructed otherwise.     TREATMENT FOR COMMON SIDE EFFECTS:  - Mild abdominal pain, nausea, belching, bloating or excessive gas:  rest,   eat lightly and use a heating pad.  - Sore Throat: treat with throat lozenges and/or gargle with warm salt   water.  - Because air was used during the procedure, expelling large amounts of air   from your rectum or belching is normal.  - If a bowel prep was taken, you may not have a bowel movement for 1-3 days.    This is normal.  SYMPTOMS TO WATCH FOR AND REPORT TO YOUR PHYSICIAN:  1. Abdominal pain or bloating, other than gas cramps.  2. Chest pain.  3. Back pain.  4. Signs of infection such as: chills or fever occurring within 24 hours   after the procedure.  5. Rectal bleeding, which would show as bright red, maroon, or black stools.   (A tablespoon of blood from the rectum is not serious, especially if    hemorrhoids are present.)  6. Vomiting.  7. Weakness or dizziness.  GO DIRECTLY TO THE NEAREST EMERGENCY ROOM IF YOU HAVE ANY OF THE FOLLOWING:      Difficulty breathing              Chills and/or fever over 101 F   Persistent vomiting and/or vomiting blood   Severe abdominal pain   Severe chest pain   Black, tarry stools   Bleeding- more than one tablespoon   Any other symptom or condition that you feel may need urgent attention  Your doctor recommends these additional instructions:  If any biopsies were taken, your doctors clinic will contact you in 1 to 2   weeks with any results.  - Perform a colonoscopy today.   - Further recommendations to follow in colonoscopy report.  For questions, problems or results please call your physician - Dixon Douglas MD at Work:  [phone].  [facility], EMERGENCY ROOM PHONE NUMBER: [phone]  IF A COMPLICATION OR EMERGENCY SITUATION ARISES AND YOU ARE UNABLE TO REACH   YOUR PHYSICIAN - GO DIRECTLY TO THE EMERGENCY ROOM.  Dixon Douglas MD  8/21/2023 9:21:47 AM  This report has been verified and signed electronically.  Dear patient,  As a result of recent federal legislation (The Federal Cures Act), you may   receive lab or pathology results from your procedure in your [facility]   account before your physician is able to contact you. Your physician or   their representative will relay the results to you with their   recommendations at their soonest availability.  Thank you,  PROVATION

## 2023-08-21 NOTE — PLAN OF CARE
Reviewed plan of care with patient, safety wrist bands applied, all personal belongings placed underneath the stretcher. Pt voices no concerns at this time.      LOC: The patient is awake, alert and aware of environment with an appropriate affect, the patient is oriented x 3 and speaking appropriately.   APPEARANCE: Patient appears comfortable and in no acute distress, patient is clean and well groomed.  SKIN: The skin is warm and dry, color consistent with ethnicity.   MUSCULOSKELETAL: Patient moving all extremities spontaneously, no swelling noted.  RESPIRATORY: Airway is open and patent, respirations are spontaneous, patient has a normal effort and rate, no accessory muscle use noted.  CARDIAC: Patient has a normal rate and regular rhythm, no edema noted, capillary refill < 3 seconds.   GASTRO: Soft and non tender to palpation, no distention noted.   : Pt denies any pain or frequency with urination.  NEURO: Pt opens eyes spontaneously, behavior appropriate to situation, follows commands.

## 2023-08-21 NOTE — PROVATION PATIENT INSTRUCTIONS
Discharge Summary/Instructions after an Endoscopic Procedure  Patient Name: Margaret Hernández  Patient MRN: [id number]  Patient YOB: 1940 Monday, August 21, 2023  Dixon Douglas MD  Dear patient,  As a result of recent federal legislation (The Federal Cures Act), you may   receive lab or pathology results from your procedure in your [facility]   account before your physician is able to contact you. Your physician or   their representative will relay the results to you with their   recommendations at their soonest availability.  Thank you,  RESTRICTIONS:  During your procedure today, you received medications for sedation.  These   medications may affect your judgment, balance and coordination.  Therefore,   for 24 hours, you have the following restrictions:   - DO NOT drive a car, operate machinery, make legal/financial decisions,   sign important papers or drink alcohol.    ACTIVITY:  Today: no heavy lifting, straining or running due to procedural   sedation/anesthesia.  The following day: return to full activity including work.  DIET:  Eat and drink normally unless instructed otherwise.     TREATMENT FOR COMMON SIDE EFFECTS:  - Mild abdominal pain, nausea, belching, bloating or excessive gas:  rest,   eat lightly and use a heating pad.  - Sore Throat: treat with throat lozenges and/or gargle with warm salt   water.  - Because air was used during the procedure, expelling large amounts of air   from your rectum or belching is normal.  - If a bowel prep was taken, you may not have a bowel movement for 1-3 days.    This is normal.  SYMPTOMS TO WATCH FOR AND REPORT TO YOUR PHYSICIAN:  1. Abdominal pain or bloating, other than gas cramps.  2. Chest pain.  3. Back pain.  4. Signs of infection such as: chills or fever occurring within 24 hours   after the procedure.  5. Rectal bleeding, which would show as bright red, maroon, or black stools.   (A tablespoon of blood from the rectum is not serious, especially if    hemorrhoids are present.)  6. Vomiting.  7. Weakness or dizziness.  GO DIRECTLY TO THE NEAREST EMERGENCY ROOM IF YOU HAVE ANY OF THE FOLLOWING:      Difficulty breathing              Chills and/or fever over 101 F   Persistent vomiting and/or vomiting blood   Severe abdominal pain   Severe chest pain   Black, tarry stools   Bleeding- more than one tablespoon   Any other symptom or condition that you feel may need urgent attention  Your doctor recommends these additional instructions:  If any biopsies were taken, your doctors clinic will contact you in 1 to 2   weeks with any results.  - Return patient to hospital call for ongoing care.   - Advance diet as tolerated.   - Continue present medications.   - Resume Eliquis (apixaban) at prior dose today.   - No recommendation at this time regarding repeat colonoscopy.   - Monitor blood counts overnight.  If stable, to visualize the small bowel,   perform outpatient video capsule endoscopy.   - Paitent will need follow-up in GI clinic at some point.  For questions, problems or results please call your physician - Dixon Douglas MD at Work:  [phone].  [facility], EMERGENCY ROOM PHONE NUMBER: [phone]  IF A COMPLICATION OR EMERGENCY SITUATION ARISES AND YOU ARE UNABLE TO REACH   YOUR PHYSICIAN - GO DIRECTLY TO THE EMERGENCY ROOM.  Dixon Douglas MD  8/21/2023 9:14:08 AM  This report has been verified and signed electronically.  Dear patient,  As a result of recent federal legislation (The Federal Cures Act), you may   receive lab or pathology results from your procedure in your [facility]   account before your physician is able to contact you. Your physician or   their representative will relay the results to you with their   recommendations at their soonest availability.  Thank you,  PROVATION

## 2023-08-21 NOTE — PROGRESS NOTES
[facility] - Int[facility] ([place] [id number])  Cardiology  Progress Note    Patient Name: Margaret Hernández Jr.  MRN: [id number]  Admission Date: 8/18/2023  Hospital Length of Stay: 3 days  Code Status: Full Code   Attending Physician: Marsha Dixon MD   Primary Care Physician: Stephanie Hernandez NP  Expected Discharge Date: 8/22/2023  Principal Problem:Acute blood loss anemia    Subjective:     Interval History: No acute overnight events. Patient in endoscopy this AM which did not demonstrate any source of bleeding. Hgb 7.5 this morning. TTE pending.    Review of Systems   Constitutional: Negative for chills, decreased appetite, diaphoresis, fever, malaise/fatigue and weight gain.   HENT:  Negative for congestion and ear discharge.    Eyes:  Negative for blurred vision.   Cardiovascular:  Negative for chest pain, dyspnea on exertion, irregular heartbeat, leg swelling, orthopnea, palpitations and paroxysmal nocturnal dyspnea.   Respiratory:  Negative for cough, shortness of breath, snoring and sputum production.    Skin:  Negative for color change, dry skin and rash.   Musculoskeletal:  Negative for back pain, falls, joint pain and neck pain.   Gastrointestinal:  Negative for bloating, abdominal pain, constipation and diarrhea.   Genitourinary:  Negative for dysuria, flank pain, hematuria and hesitancy.   Neurological:  Negative for focal weakness, headaches, numbness and seizures.   Psychiatric/Behavioral:  Negative for altered mental status, depression and hallucinations.      Objective:     Vital Signs (Most Recent):  Temp: 98.3 °F (36.8 °C) (08/21/23 0742)  Pulse: 68 (08/21/23 0742)  Resp: 18 (08/21/23 0742)  BP: (!) 189/80 (08/21/23 0742)  SpO2: 96 % (08/21/23 0742) Vital Signs (24h Range):  Temp:  [97.5 °F (36.4 °C)-98.3 °F (36.8 °C)] 98.3 °F (36.8 °C)  Pulse:  [66-82] 68  Resp:  [16-20] 18  SpO2:  [96 %-100 %] 96 %  BP: (119-189)/(48-81) 189/80     Weight: 60.5 kg (133 lb 6.1 oz)  Body mass index is 20.89 kg/m².      SpO2: 96 %         Intake/Output Summary (Last 24 hours) at 8/21/2023 0839  Last data filed at 8/21/2023 0306  Gross per 24 hour   Intake 2125.83 ml   Output --   Net 2125.83 ml       Lines/Drains/Airways       Peripherally Inserted Central Catheter Line  Duration             PICC Single Lumen 08/08/23 1421 right basilic 12 days              Central Venous Catheter Line  Duration                  Hemodialysis AV Graft Left upper arm -- days              Peripheral Intravenous Line  Duration                  Hemodialysis AV Fistula 03/27/23 1911 Left upper arm 146 days         Peripheral IV - Single Lumen 08/18/23 0905 18 G Anterior;Right Forearm 2 days                       Physical Exam  Constitutional:       General: He is not in acute distress.     Appearance: He is not ill-appearing.   HENT:      Nose: Nose normal.      Mouth/Throat:      Mouth: Mucous membranes are moist.   Eyes:      Pupils: Pupils are equal, round, and reactive to light.   Neck:      Comments: No JVD appreciated   Cardiovascular:      Rate and Rhythm: Normal rate and regular rhythm.      Pulses: Normal pulses.      Heart sounds: Murmur (Grade 2/6 systolic crescendo-decrescendo murmur w/ radiation to carotids) heard.   Pulmonary:      Effort: Pulmonary effort is normal. No respiratory distress.      Breath sounds: No wheezing or rales.   Abdominal:      General: Abdomen is flat. There is no distension.      Palpations: Abdomen is soft.      Tenderness: There is no right CVA tenderness or left CVA tenderness.   Musculoskeletal:         General: No swelling.      Cervical back: Normal range of motion and neck supple. No tenderness.      Right lower leg: No edema.      Left lower leg: No edema.      Comments: LUE AV fistula with good thrill   Skin:     General: Skin is warm.      Coloration: Skin is not pale.      Findings: No rash.   Neurological:      General: No focal deficit present.      Mental Status: He is alert and oriented to person,  place, and time.      Motor: No weakness.         Significant Labs: CMP   Recent Labs   Lab 08/20/23  0542 08/20/23  1431 08/21/23  0402   * 135* 136   K 5.6* 5.0 5.0    99 95   CO2 24 21* 21*   * 162* 115*   BUN 74* 77* 83*   CREATININE 6.5* 7.4* 7.6*   CALCIUM 9.0 9.1 9.0   ANIONGAP 10 15 20*    and CBC   Recent Labs   Lab 08/20/23  1430 08/20/23  2208 08/21/23  0402   WBC 17.16* 13.95*  13.95* 10.32   HGB 7.2* 8.2*  8.2* 7.5*   HCT 21.9* 25.1*  25.1* 22.8*   * 190  190 156     Assessment and Plan:     HFrEF (heart failure with reduced ejection fraction)  Patient has known LVEF 30-35% from TTE 03/2023 and WERNER 04/2023. Etiology may be ischemic cardiomyopathy but we don't have any coronary angiogram or stress test in our system to prove otherwise. Patient will benefit from an ischemic workup to evaluate the etiology of his cardiomyopathy after acute bleed is addressed.    - Will follow repeat TTE from 08/21/2023  - If LVEF remains < 40%, patient would benefit from optimizing HFrEF GDMT       - Already on carvedilol, can continue      - Consider starting ACE/ARB/ARNI      - MRA would not be indicated considering ESRD status      - SGLT2-I not indicated with current available data considering ESRD status  - Patient should follow up with EP outpatient to discuss installation of an ICD if ischemic cardiomyopathy; if non-ischemic, data is controversial      Atrial flutter  S/p [facility] 04/2023. Currently sinus rhythm. On carvedilol 12.5 mg BID at home.    -- Resume coreg once stable from GI bleed  -- AC with Eliquis 2.5 mg PO BID (age, Cr) when stable from GI bleed perspective    Type II NSTEMI (demand ischemia)  Patient is an 83 year old male with anemia, ESRD on HD, HFrEF( most recent EF 30%), atrial flutter on Eliquis status post DCCV April 2023, neurosyphilis, HLD, HTN presented to [facility] on August 18 with chest discomfort. Transferred to [facility] with Cardiology consultation, and  then transferred to [facility] [place] higher level of care. ECG 08/18/2023 at 6 AM independently reviewed, showed diffuse ST depressions in the anterior, inferior, and lateral leads with aVR elevation. ECG 08/19/2023 independently reviewed, showed NST, 1st degree AV block, and complete resolution of ST-T changes. Patient continues to report being angina free. Patient had troponin elevation 0.094 --> 2.88--> 3.79 --> 4.21. While biomarker elevation is concerning for underlying coronary artery disease, this presentation is not consistent with acute plaque rupture. This presentation is likely a Type II MI secondary to demand ischemia from acute blood loss anemia (Hgb 6.1 g/dL at OSH). GI upper and lower endoscopy 8/21 without evidence of GI bleed. Unclear down-trend in Hgb, would continue to monitor.    - Trend Hgb  - Echo was already ordered, will follow  - Recommend continuing anti-anginal therapy with beta - blocker  - Patient is already at target LDL < 70 mg/dL (currently 58 mg/dL) without statin  - Consider isosorbide mononitrate if anginal symptoms worsen and BP tolerates      VTE Risk Mitigation (From admission, onward)         Ordered     heparin (porcine) injection 1,000 Units  As needed (PRN)         08/19/23 0718     Reason for No Pharmacological VTE Prophylaxis  Once        Question:  Reasons:  Answer:  Risk of Bleeding    08/18/23 2104     IP VTE HIGH RISK PATIENT  Once         08/18/23 2104     Place sequential compression device  Until discontinued         08/18/23 2104                Odilon Vazquez MD  Cardiology  [facility] - Intensive Care ([place]-[facility])

## 2023-08-21 NOTE — ASSESSMENT & PLAN NOTE
Patient has known LVEF 30-35% from TTE 03/2023 and WERNER 04/2023. Etiology may be ischemic cardiomyopathy but we don't have any coronary angiogram or stress test in our system to prove otherwise. Patient will benefit from an ischemic workup to evaluate the etiology of his cardiomyopathy after acute bleed is addressed.    - Will follow repeat TTE from 08/21/2023  - If LVEF remains < 40%, patient would benefit from optimizing HFrEF GDMT       - Already on carvedilol, can continue      - Consider starting ACE/ARB/ARNI      - MRA would not be indicated considering ESRD status      - SGLT2-I not indicated with current available data considering ESRD status  - Patient should follow up with EP outpatient to discuss installation of an ICD if ischemic cardiomyopathy; if non-ischemic, data is controversial

## 2023-08-21 NOTE — H&P
Endoscopy History and Physical    PCP - Stephanie Hernandez NP    Procedure - EGD  ASA - per anesthesia  Mallampati - per anesthesia  Plan of anesthesia - MAC    HPI:  This is a 83 y.o. male here for evaluation of :  melena  Colonoscopy several years ago, unsure of results  Eliquis last taken 4 days ago    ROS:  Constitutional: No fevers, chills  CV: No chest pain  Pulm: No cough  Ophtho: No vision changes  GI: see HPI  Derm: No rash    Medical History:  has a past medical history of Abdominal hernia, Anemia, Chronic kidney disease, ED (erectile dysfunction), Glaucoma, Hard of hearing, Hyperlipidemia, Hypertension, and Sinus congestion.    Surgical History:  has a past surgical history that includes Cataract extraction; Prostate surgery; Hernia repair; PERMACATH PLACEMENT (Right); AV fistula placement (Left, 04/09/2018); Skin biopsy; Fistulogram (Left, 01/14/2020); Placement of arteriovenous graft (Left, 01/27/2020); Fistulogram (Left, 03/10/2020); Fistulogram (Left, 06/16/2020); Percutaneous transluminal angioplasty of arteriovenous fistula (Left, 06/16/2020); Ultrasound guidance (06/16/2020); Fistulogram (Left, 02/08/2022); Biopsy of temporal artery (Left, 03/07/2023); transesophageal echocardiogram with possible cardioversion (jane w/ poss cardioversion) (N/A, 04/11/2023); and Peripherally inserted central catheter insertion (N/A, 8/8/2023).    Family History: family history includes Anuerysm in his sister; Diabetes in his mother; No Known Problems in his brother and father.. Otherwise no colon cancer, inflammatory bowel disease, or GI malignancies.    Social History:  reports that he has never smoked. He has never used smokeless tobacco. He reports that he does not currently use alcohol. He reports current drug use. Drug: Hashish.    Review of patient's allergies indicates:   Allergen Reactions    Benazepril Swelling    Diphenhydramine hcl      Other reaction(s): Unknown    Gabapentin Other (See Comments)      Double vision and headaches       Medications:   Medications Prior to Admission   Medication Sig Dispense Refill Last Dose    apixaban (ELIQUIS) 2.5 mg Tab Take 1 tablet (2.5 mg total) by mouth 2 (two) times daily. 60 tablet 11     b complex vitamins tablet Take 1 tablet by mouth once daily.       blood sugar diagnostic Strp To check BG 2 times daily, to use with insurance preferred meter True Metric 100 each 0     blood-glucose meter kit To check BG 2 times daily, to use with insurance preferred meter True Metric 1 each 0     calcium acetate,phosphat bind, (PHOSLO) 667 mg capsule TAKE ONE CAPSULE BY MOUTH THREE TIMES DAILY WITH MEALS       carvediloL (COREG) 12.5 MG tablet Take 2 tablets (25 mg total) by mouth 2 (two) times daily. 180 tablet 3     cetyl,stear.alcoh-prop gly-sls (CETAPHIL) Crea Apply topically.       dextrose 5 % (D5W) SolP 100 mL with penicillin G potassium 5 million unit SolR 8 Million Units Inject 8 Million Units into the vein continuous. for 12 days 12 Dose 0     dorzolamide-timolol 2-0.5% (COSOPT) 22.3-6.8 mg/mL ophthalmic solution Place 1 drop into both eyes 2 (two) times daily. 10 mL 11     fluticasone propionate (FLONASE) 50 mcg/actuation nasal spray SHAKE LIQUID AND USE 1 SPRAY(50 MCG) IN EACH NOSTRIL EVERY DAY 16 g 6     furosemide (LASIX) 40 MG tablet Take 1 tablet (40 mg total) by mouth 2 (two) times daily. Take on non dialysis days 60 tablet 11     lancets Misc To check BG 2 times daily, to use with insurance preferred meter True Metric 100 each 0     lidocaine-prilocaine (EMLA) cream APPLY SMALL AMOUNT TO ACCESS SITE 1-2 HOURS BEFORE TREATMENT. COVER AREA WITH AN OCCLUSIVE DRESSING AS DIRECTED.  99     multivit-min/FA/lycopen/lutein (CENTRUM SILVER MEN ORAL) Take by mouth.            Vital Signs:   Vitals:    08/21/23 0742   BP: (!) 189/80   Pulse: 68   Resp: 18   Temp: 98.3 °F (36.8 °C)       General Appearance: Well appearing in no acute distress  Eyes:    No scleral icterus  ENT:  atraumatic  Abdomen: Soft, nondistended  Extremities: no tenderness  Skin: normal color    Labs:  Lab Results   Component Value Date    WBC 10.32 08/21/2023    HGB 7.5 (L) 08/21/2023    HCT 22.8 (L) 08/21/2023     08/21/2023    CHOL 106 (L) 08/19/2023    TRIG 104 08/19/2023    HDL 27 (L) 08/19/2023    ALT 9 (L) 08/18/2023    AST 15 08/18/2023     08/21/2023    K 5.0 08/21/2023    CL 95 08/21/2023    CREATININE 7.6 (H) 08/21/2023    BUN 83 (H) 08/21/2023    CO2 21 (L) 08/21/2023    TSH 2.017 03/27/2023    INR 1.0 08/18/2023    HGBA1C 5.1 08/19/2023       I have explained the risks and benefits of endoscopy procedures to the patient/their POA including but not limited to bleeding, perforation, infection, and death.  The patient/POA was asked if they understand and allowed to ask any further questions to their satisfaction.    Proceed with EGD and colonoscopy    Kenny Marquez MD

## 2023-08-21 NOTE — ANESTHESIA PREPROCEDURE EVALUATION
08/21/2023  Margaret Hernández Jr. is a 83 y.o., male.  [facility]  Anesthesia Pre-Operative Evaluation       Patient Name: Margaret Hernández Jr.  YOB: 1940  MRN: [id number]  [facility]: [id number]      Code Status: Full Code   Date of Procedure: 8/21/2023  Anesthesia: Choice Procedure: Procedure(s) (LRB):  EGD (ESOPHAGOGASTRODUODENOSCOPY) (N/A)  COLONOSCOPY (N/A)  Pre-Operative Diagnosis: Symptomatic anemia [D64.9]  Melena [K92.1]  Proceduralist: Surgeon(s) and Role:  Panel 1:     * Dixon Douglas MD - Primary  Panel 2:     * Dixon Douglas MD - Primary Registered Nurse: Lana Velez RN; Kenny Villegas RN      SUBJECTIVE:   Margaret Hernández Jr. is a 83 y.o. male who is here today for Procedure(s) (LRB):  EGD (ESOPHAGOGASTRODUODENOSCOPY) (N/A)  COLONOSCOPY (N/A).       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  No diaphoresis, dyspnea, or nausea/vomiting noted.  EKG had ST elevation in AVR with depressions in inferior, anterior, and lateral leads.  Findings were felt to be consistent with STEMI.  He received sublingual nitroglycerin, aspirin, ticagrelor, and heparin bolus/infusion. At [facility] ED - He was seen by Cardiology in the emergency department, and it was noted that his hemoglobin was 6.1.  It was felt that his findings were more likely a type 2 NSTEMI.  Heparin was discontinued, and 2 units packed red blood cells were given.  Troponin levels were 0.064 with repeat 0.094.  Stool for occult  blood was positive, and he did report dark stool recently.  He is having intermittent chest pain but appears comfortable. In the emergency department he received sublingual nitroglycerin and IV morphine. With the symptomatic anemia, cardiac issues, and heme positive stool, ED spoke with Cardiology at [facility].  After reviewing the case was felt that urgent cardiac catheterization was not indicated at this time. Tropnon 2.8 this am. K 6.1. BNP 1523.      Anticoagulants   Medication Route Frequency    heparin (porcine) injection 1,000 Units Intra-Catheter PRN       he has a current medication list which includes the following long-term medication(s): blood-glucose meter, calcium acetate(phosphat bind), carvedilol, dorzolamide-timolol 2-0.5%, and furosemide.   ALLERGIES:     Review of patient's allergies indicates:   Allergen Reactions    Benazepril Swelling    Diphenhydramine hcl      Other reaction(s): Unknown    Gabapentin Other (See Comments)     Double vision and headaches     LDA:          Lines/Drains/Airways     Peripherally Inserted Central Catheter Line  Duration           PICC Single Lumen 08/08/23 1421 right basilic 12 days          Central Venous Catheter Line  Duration                Hemodialysis AV Graft Left upper arm -- days          Peripheral Intravenous Line  Duration                Hemodialysis AV Fistula 03/27/23 1911 Left upper arm 146 days         Peripheral IV - Single Lumen 08/18/23 0905 18 G Anterior;Right Forearm 2 days               RELEVANT MEDICATIONS:     Antibiotics (From admission, onward)    Start     Stop Route Frequency Ordered    08/18/23 2230  penicillin G potassium 10 Million Units in dextrose 5 % (D5W) 500 mL CONTINUOUS INFUSION         08/24/23 0529 IV Every 24 hours (non-standard times) 08/18/23 2119        VTE Risk Mitigation (From admission, onward)         Ordered     heparin (porcine) injection 1,000 Units  As needed (PRN)         08/19/23 0718     Reason for  No Pharmacological VTE Prophylaxis  Once        Question:  Reasons:  Answer:  Risk of Bleeding    08/18/23 2104     IP VTE HIGH RISK PATIENT  Once         08/18/23 2104     Place sequential compression device  Until discontinued         08/18/23 2104                History:     Active Hospital Problems    Diagnosis  POA    *Acute blood loss anemia [D62]  Yes    Acute respiratory failure with hypoxia [J96.01]  Yes    Hyperkalemia [E87.5]  Yes    Neurosyphilis in adult [A52.3]  Yes    HFrEF (heart failure with reduced ejection fraction) [I50.20]  Yes    Atrial flutter [I48.92]  Yes    Type II NSTEMI (demand ischemia) [I21.4]  Yes    Type 2 diabetes mellitus with chronic kidney disease on chronic dialysis, without long-term current use of insulin [E11.22, N18.6, Z99.2]  Not Applicable    ESRD on dialysis [N18.6, Z99.2]  Not Applicable    Anemia of chronic renal failure [N18.9, D63.1]  Yes     Chronic    Primary hypertension [I10]  Yes     Chronic      Resolved Hospital Problems   No resolved problems to display.     Patient Active Problem List   Diagnosis    Primary open angle glaucoma of both eyes, moderate stage    CKD (chronic kidney disease) stage 5, GFR less than 15 ml/min    Primary hypertension    Episcleritis of left eye    Anemia of chronic renal failure    Current nonadherence to medical treatment    ESRF (end stage renal failure)    Anemia due to chronic kidney disease, on chronic dialysis    ESRD on dialysis    Arteriovenous graft stenosis    AV graft stenosis, subsequent encounter    Type 2 diabetes mellitus with chronic kidney disease on chronic dialysis, without long-term current use of insulin    Thoracic aortic aneurysm without rupture    Age-related nuclear cataract of left eye    Acute blood loss anemia    Type II NSTEMI (demand ischemia)    Atrial flutter    Current chronic use of systemic steroids    HFrEF (heart failure with reduced ejection fraction)     Neurosyphilis in adult    Vitamin D deficiency, unspecified    Other disorders of phosphorus metabolism    Mild protein-calorie malnutrition    Long term (current) use of anticoagulants    Iron deficiency anemia, unspecified    Hypertension secondary to other renal disorders    Hyperlipidemia, unspecified    Congenital glaucoma    Hyperkalemia    Acute respiratory failure with hypoxia     Medical History   Past Medical History:   Diagnosis Date    Abdominal hernia     Anemia     Chronic kidney disease     ED (erectile dysfunction)     Glaucoma     Hard of hearing     Hyperlipidemia     Hypertension     Sinus congestion      Surgical History:    has a past surgical history that includes Cataract extraction; Prostate surgery; Hernia repair; PERMACATH PLACEMENT (Right); AV fistula placement (Left, 04/09/2018); Skin biopsy; Fistulogram (Left, 01/14/2020); Placement of arteriovenous graft (Left, 01/27/2020); Fistulogram (Left, 03/10/2020); Fistulogram (Left, 06/16/2020); Percutaneous transluminal angioplasty of arteriovenous fistula (Left, 06/16/2020); Ultrasound guidance (06/16/2020); Fistulogram (Left, 02/08/2022); Biopsy of temporal artery (Left, 03/07/2023); transesophageal echocardiogram with possible cardioversion (jane w/ poss cardioversion) (N/A, 04/11/2023); and Peripherally inserted central catheter insertion (N/A, 8/8/2023).   Social History:    reports that he has never smoked. He has never used smokeless tobacco. He reports that he does not currently use alcohol. He reports current drug use. Drug: Hashish.     OBJECTIVE:     Vital Signs (Most Recent):  Temp: 36.8 °C (98.3 °F) (08/21/23 0742)  Pulse: 68 (08/21/23 0742)  Resp: 18 (08/21/23 0742)  BP: (!) 189/80 (08/21/23 0742)  SpO2: 96 % (08/21/23 0742) Vital Signs Range (Last 24H):  Temp:  [36.4 °C (97.5 °F)-36.8 °C (98.3 °F)]   Pulse:  [66-82]   Resp:  [16-20]   BP: (119-189)/(48-81)   SpO2:  [96 %-100 %]      Last 3 Vitals:   Vitals - 1  value per visit 8/21/2023 8/21/2023   SYSTOLIC 148 189   DIASTOLIC 81 80   Pulse 82 68   Temp 97.8 98.3   Resp 16 18   SPO2 100 96     Body mass index is 20.89 kg/m².   Wt Readings from Last 4 Encounters:   08/21/23 60.5 kg (133 lb 6.1 oz)   08/18/23 61.2 kg (135 lb)   08/18/23 59.4 kg (131 lb)   08/08/23 61.5 kg (135 lb 9.3 oz)     Significant Labs:  Lab Results   Component Value Date    WBC 10.32 08/21/2023    HGB 7.5 (L) 08/21/2023    HCT 22.8 (L) 08/21/2023     08/21/2023     08/21/2023    K 5.0 08/21/2023    CL 95 08/21/2023    CREATININE 7.6 (H) 08/21/2023    BUN 83 (H) 08/21/2023    CO2 21 (L) 08/21/2023     (H) 08/21/2023    CALCIUM 9.0 08/21/2023    MG 2.2 08/21/2023    PHOS 5.2 (H) 08/21/2023    ALKPHOS 41 (L) 08/18/2023    ALT 9 (L) 08/18/2023    AST 15 08/18/2023    ALBUMIN 2.3 (L) 08/18/2023    INR 1.0 08/18/2023    APTT 25.5 08/18/2023    HGBA1C 5.1 08/19/2023    TROPONINI 3.544 (H) 08/19/2023    BNP 1,523 (H) 08/18/2023     No LMP for male patient.      EKG:   Results for orders placed or performed during the hospital encounter of 08/18/23   EKG 12-lead    Collection Time: 08/18/23  6:39 AM    Narrative    Test Reason : I21.3,    Vent. Rate : 102 BPM     Atrial Rate : 102 BPM     P-R Int : 198 ms          QRS Dur : 088 ms      QT Int : 352 ms       P-R-T Axes : 057 038 221 degrees     QTc Int : 458 ms    Sinus tachycardia  Marked ST abnormality, possible inferior subendocardial injury  Marked ST abnormality, possible anterolateral subendocardial injury  Abnormal ECG  When compared with ECG of 18-AUG-2023 04:55,  ST more depressed in Lateral leads  Confirmed by Dixon Lobo MD ([id number]) on 8/18/2023 3:36:27 PM    Referred By: NO PCP           Confirmed By:Dixon Lobo MD       TTE:  Results for orders placed or performed during the hospital encounter of 03/27/23   Echo   Result Value Ref Range    Narrative    · The left ventricle is normal in size with concentric remodeling and    moderately decreased systolic function.  · The estimated ejection fraction is 30%.  · There is moderate left ventricular global hypokinesis.  · Indeterminate left ventricular diastolic function.  · Moderate right ventricular enlargement with moderately reduced right   ventricular systolic function.  · Severe left atrial enlargement.  · Moderate right atrial enlargement.  · There is mild aortic valve stenosis.  · Moderate aortic regurgitation.  · Moderate-to-severe mitral regurgitation.  · Severe tricuspid regurgitation.        EF   Date Value Ref Range Status   03/28/2023 30 % Final          ASSESSMENT/PLAN:         Pre-op Assessment    I have reviewed the Patient Summary Reports.     I have reviewed the Nursing Notes. I have reviewed the NPO Status.   I have reviewed the Medications.     Review of Systems      Physical Exam  General: Well nourished, Cooperative and Alert    Airway:  Mallampati: III / II  Mouth Opening: Normal  TM Distance: Normal  Tongue: Normal  Neck ROM: Normal ROM    Dental:  Edentulous    Chest/Lungs:  Normal Respiratory Rate    Heart:  Rate: Normal  Rhythm: Regular Rhythm        Anesthesia Plan  Type of Anesthesia, risks & benefits discussed:    Anesthesia Type: Gen Natural Airway, MAC  Intra-op Monitoring Plan: Standard ASA Monitors  Post Op Pain Control Plan: IV/PO Opioids PRN  Induction:  IV  Informed Consent: Informed consent signed with the Patient and all parties understand the risks and agree with anesthesia plan.  All questions answered.   ASA Score: 3  Day of Surgery Review of History & Physical: H&P Update referred to the surgeon/provider.    Ready For Surgery From Anesthesia Perspective.     .

## 2023-08-21 NOTE — PROGRESS NOTES
Nursing Transfer Note      Reason patient is being transferred: post procedure    Transfer To: [id number]    Transfer via stretcher    Transfer with IV pump    Transported by patient transport    Medicines sent: none    Any special needs or follow-up needed: routine    Chart send with patient: Yes    Notified: patient declined family notification at this time    Patient reassessed at: 8/21/2023 0915

## 2023-08-21 NOTE — SUBJECTIVE & OBJECTIVE
Interval History:  Patient seen and examined at bedside.    Returning from scopes.   NAEO. Doing well this AM. Just slight HA.   Denies any shortness of breath, chest pain.   No melena this AM.   Discussed scope findings with GI; will resume A/C and monitor Hgb overnight.   Patient updated regarding care plan.      Review of Systems   Constitutional:  Positive for activity change, appetite change and fatigue. Negative for chills.   HENT:  Positive for congestion. Negative for sore throat and trouble swallowing.    Respiratory:  Positive for cough. Negative for shortness of breath.    Cardiovascular:  Negative for palpitations and leg swelling.   Gastrointestinal:  Negative for abdominal pain, nausea and vomiting.   Skin:  Negative for rash.   Neurological:  Positive for weakness. Negative for headaches.   Psychiatric/Behavioral: Negative.         Objective:    Temp: 97.5 °F (36.4 °C) (08/21/23 1138)  Pulse: 73 (08/21/23 1153)  Resp: 16 (08/21/23 1015)  BP: (!) 132/54 (08/21/23 1138)  SpO2: 99 % (08/21/23 1138)    Weight: 60.5 kg (133 lb 6.1 oz) (08/21/23 0500)    Body mass index is 20.89 kg/m².    Physical Exam  Vitals and nursing note reviewed.   Constitutional:       Appearance: He is not toxic-appearing or diaphoretic.      Interventions: Nasal cannula in place.      Comments: RUE PICC LINE   HENT:      Head: Normocephalic and atraumatic.      Mouth/Throat:      Mouth: Mucous membranes are moist.      Pharynx: Oropharynx is clear.   Eyes:      General: No scleral icterus.        Right eye: No discharge.         Left eye: No discharge.   Cardiovascular:      Rate and Rhythm: Normal rate.      Pulses: Normal pulses.      Heart sounds: Murmur heard.      Comments: LUE AVF with thrill/bruit  Pulmonary:      Effort: Pulmonary effort is normal. No respiratory distress.      Breath sounds: Normal breath sounds. No wheezing or rales.   Abdominal:      General: Bowel sounds are normal. There is no distension.       Palpations: Abdomen is soft.      Tenderness: There is no abdominal tenderness. There is no guarding or rebound.   Musculoskeletal:      Cervical back: Neck supple. No tenderness.      Right lower leg: No edema.      Left lower leg: No edema.   Lymphadenopathy:      Cervical: No cervical adenopathy.   Skin:     General: Skin is warm.      Findings: No rash.   Neurological:      General: No focal deficit present.      Mental Status: He is alert and oriented to person, place, and time.   Psychiatric:         Behavior: Behavior normal.         Significant Labs: All pertinent labs within the past 24 hours have been reviewed.    Recent Results (from the past 24 hour(s))   CBC auto differential    Collection Time: 08/20/23  2:30 PM   Result Value Ref Range    WBC 17.16 (H) 3.90 - 12.70 K/uL    RBC 2.38 (L) 4.60 - 6.20 M/uL    Hemoglobin 7.2 (L) 14.0 - 18.0 g/dL    Hematocrit 21.9 (L) 40.0 - 54.0 %    MCV 92 82 - 98 fL    MCH 30.3 27.0 - 31.0 pg    MCHC 32.9 32.0 - 36.0 g/dL    RDW 17.6 (H) 11.5 - 14.5 %    Platelets 148 (L) 150 - 450 K/uL    MPV 11.0 9.2 - 12.9 fL    Immature Granulocytes 1.1 (H) 0.0 - 0.5 %    Gran # (ANC) 13.6 (H) 1.8 - 7.7 K/uL    Immature Grans (Abs) 0.19 (H) 0.00 - 0.04 K/uL    Lymph # 1.2 1.0 - 4.8 K/uL    Mono # 1.9 (H) 0.3 - 1.0 K/uL    Eos # 0.3 0.0 - 0.5 K/uL    Baso # 0.04 0.00 - 0.20 K/uL    nRBC 0 0 /100 WBC    Gran % 79.1 (H) 38.0 - 73.0 %    Lymph % 7.0 (L) 18.0 - 48.0 %    Mono % 11.0 4.0 - 15.0 %    Eosinophil % 1.6 0.0 - 8.0 %    Basophil % 0.2 0.0 - 1.9 %    Differential Method Automated    Basic metabolic panel    Collection Time: 08/20/23  2:31 PM   Result Value Ref Range    Sodium 135 (L) 136 - 145 mmol/L    Potassium 5.0 3.5 - 5.1 mmol/L    Chloride 99 95 - 110 mmol/L    CO2 21 (L) 23 - 29 mmol/L    Glucose 162 (H) 70 - 110 mg/dL    BUN 77 (H) 8 - 23 mg/dL    Creatinine 7.4 (H) 0.5 - 1.4 mg/dL    Calcium 9.1 8.7 - 10.5 mg/dL    Anion Gap 15 8 - 16 mmol/L    eGFR 6.7 (A) >60  mL/min/1.73 m^2   CBC auto differential    Collection Time: 08/20/23 10:08 PM   Result Value Ref Range    WBC 13.95 (H) 3.90 - 12.70 K/uL    RBC 2.77 (L) 4.60 - 6.20 M/uL    Hemoglobin 8.2 (L) 14.0 - 18.0 g/dL    Hematocrit 25.1 (L) 40.0 - 54.0 %    MCV 91 82 - 98 fL    MCH 29.6 27.0 - 31.0 pg    MCHC 32.7 32.0 - 36.0 g/dL    RDW 17.7 (H) 11.5 - 14.5 %    Platelets 190 150 - 450 K/uL    MPV 11.1 9.2 - 12.9 fL    Immature Granulocytes 0.4 0.0 - 0.5 %    Gran # (ANC) 10.6 (H) 1.8 - 7.7 K/uL    Immature Grans (Abs) 0.06 (H) 0.00 - 0.04 K/uL    Lymph # 1.2 1.0 - 4.8 K/uL    Mono # 1.7 (H) 0.3 - 1.0 K/uL    Eos # 0.3 0.0 - 0.5 K/uL    Baso # 0.03 0.00 - 0.20 K/uL    nRBC 0 0 /100 WBC    Gran % 76.2 (H) 38.0 - 73.0 %    Lymph % 8.7 (L) 18.0 - 48.0 %    Mono % 12.1 4.0 - 15.0 %    Eosinophil % 2.4 0.0 - 8.0 %    Basophil % 0.2 0.0 - 1.9 %    Differential Method Automated    CBC auto differential    Collection Time: 08/20/23 10:08 PM   Result Value Ref Range    WBC 13.95 (H) 3.90 - 12.70 K/uL    RBC 2.77 (L) 4.60 - 6.20 M/uL    Hemoglobin 8.2 (L) 14.0 - 18.0 g/dL    Hematocrit 25.1 (L) 40.0 - 54.0 %    MCV 91 82 - 98 fL    MCH 29.6 27.0 - 31.0 pg    MCHC 32.7 32.0 - 36.0 g/dL    RDW 17.7 (H) 11.5 - 14.5 %    Platelets 190 150 - 450 K/uL    MPV 11.1 9.2 - 12.9 fL    Immature Granulocytes 0.4 0.0 - 0.5 %    Gran # (ANC) 10.6 (H) 1.8 - 7.7 K/uL    Immature Grans (Abs) 0.06 (H) 0.00 - 0.04 K/uL    Lymph # 1.2 1.0 - 4.8 K/uL    Mono # 1.7 (H) 0.3 - 1.0 K/uL    Eos # 0.3 0.0 - 0.5 K/uL    Baso # 0.03 0.00 - 0.20 K/uL    nRBC 0 0 /100 WBC    Gran % 76.2 (H) 38.0 - 73.0 %    Lymph % 8.7 (L) 18.0 - 48.0 %    Mono % 12.1 4.0 - 15.0 %    Eosinophil % 2.4 0.0 - 8.0 %    Basophil % 0.2 0.0 - 1.9 %    Differential Method Automated    POCT glucose    Collection Time: 08/20/23 10:10 PM   Result Value Ref Range    POCT Glucose 148 (H) 70 - 110 mg/dL   Basic metabolic panel    Collection Time: 08/21/23  4:02 AM   Result Value Ref Range     Sodium 136 136 - 145 mmol/L    Potassium 5.0 3.5 - 5.1 mmol/L    Chloride 95 95 - 110 mmol/L    CO2 21 (L) 23 - 29 mmol/L    Glucose 115 (H) 70 - 110 mg/dL    BUN 83 (H) 8 - 23 mg/dL    Creatinine 7.6 (H) 0.5 - 1.4 mg/dL    Calcium 9.0 8.7 - 10.5 mg/dL    Anion Gap 20 (H) 8 - 16 mmol/L    eGFR 6.5 (A) >60 mL/min/1.73 m^2   Magnesium    Collection Time: 08/21/23  4:02 AM   Result Value Ref Range    Magnesium 2.2 1.6 - 2.6 mg/dL   Phosphorus    Collection Time: 08/21/23  4:02 AM   Result Value Ref Range    Phosphorus 5.2 (H) 2.7 - 4.5 mg/dL   CBC auto differential    Collection Time: 08/21/23  4:02 AM   Result Value Ref Range    WBC 10.32 3.90 - 12.70 K/uL    RBC 2.53 (L) 4.60 - 6.20 M/uL    Hemoglobin 7.5 (L) 14.0 - 18.0 g/dL    Hematocrit 22.8 (L) 40.0 - 54.0 %    MCV 90 82 - 98 fL    MCH 29.6 27.0 - 31.0 pg    MCHC 32.9 32.0 - 36.0 g/dL    RDW 17.4 (H) 11.5 - 14.5 %    Platelets 156 150 - 450 K/uL    MPV 10.9 9.2 - 12.9 fL    Immature Granulocytes 0.3 0.0 - 0.5 %    Gran # (ANC) 7.9 (H) 1.8 - 7.7 K/uL    Immature Grans (Abs) 0.03 0.00 - 0.04 K/uL    Lymph # 0.9 (L) 1.0 - 4.8 K/uL    Mono # 1.2 (H) 0.3 - 1.0 K/uL    Eos # 0.3 0.0 - 0.5 K/uL    Baso # 0.03 0.00 - 0.20 K/uL    nRBC 0 0 /100 WBC    Gran % 76.5 (H) 38.0 - 73.0 %    Lymph % 8.9 (L) 18.0 - 48.0 %    Mono % 11.5 4.0 - 15.0 %    Eosinophil % 2.5 0.0 - 8.0 %    Basophil % 0.3 0.0 - 1.9 %    Differential Method Automated    CBC auto differential    Collection Time: 08/21/23 10:32 AM   Result Value Ref Range    WBC 11.48 3.90 - 12.70 K/uL    RBC 2.71 (L) 4.60 - 6.20 M/uL    Hemoglobin 8.0 (L) 14.0 - 18.0 g/dL    Hematocrit 24.6 (L) 40.0 - 54.0 %    MCV 91 82 - 98 fL    MCH 29.5 27.0 - 31.0 pg    MCHC 32.5 32.0 - 36.0 g/dL    RDW 17.7 (H) 11.5 - 14.5 %    Platelets 180 150 - 450 K/uL    MPV 11.5 9.2 - 12.9 fL    Immature Granulocytes 0.3 0.0 - 0.5 %    Gran # (ANC) 8.5 (H) 1.8 - 7.7 K/uL    Immature Grans (Abs) 0.04 0.00 - 0.04 K/uL    Lymph # 1.2 1.0 - 4.8  K/uL    Mono # 1.4 (H) 0.3 - 1.0 K/uL    Eos # 0.3 0.0 - 0.5 K/uL    Baso # 0.04 0.00 - 0.20 K/uL    nRBC 0 0 /100 WBC    Gran % 74.4 (H) 38.0 - 73.0 %    Lymph % 10.4 (L) 18.0 - 48.0 %    Mono % 12.1 4.0 - 15.0 %    Eosinophil % 2.5 0.0 - 8.0 %    Basophil % 0.3 0.0 - 1.9 %    Differential Method Automated    POCT glucose    Collection Time: 08/21/23 10:38 AM   Result Value Ref Range    POCT Glucose 124 (H) 70 - 110 mg/dL       Significant Imaging: I have reviewed all pertinent imaging results/findings within the past 24 hours.

## 2023-08-22 LAB
ANION GAP SERPL CALC-SCNC: 14 MMOL/L (ref 8–16)
ANION GAP SERPL CALC-SCNC: 21 MMOL/L (ref 8–16)
BASOPHILS # BLD AUTO: 0.02 K/UL (ref 0–0.2)
BASOPHILS # BLD AUTO: 0.02 K/UL (ref 0–0.2)
BASOPHILS # BLD AUTO: 0.03 K/UL (ref 0–0.2)
BASOPHILS NFR BLD: 0.2 % (ref 0–1.9)
BUN SERPL-MCNC: 19 MG/DL (ref 8–23)
BUN SERPL-MCNC: 25 MG/DL (ref 8–23)
BUN SERPL-MCNC: 93 MG/DL (ref 8–23)
BUN SERPL-MCNC: 95 MG/DL (ref 8–23)
CALCIUM SERPL-MCNC: 8.6 MG/DL (ref 8.7–10.5)
CALCIUM SERPL-MCNC: 8.9 MG/DL (ref 8.7–10.5)
CHLORIDE SERPL-SCNC: 94 MMOL/L (ref 95–110)
CHLORIDE SERPL-SCNC: 97 MMOL/L (ref 95–110)
CO2 SERPL-SCNC: 18 MMOL/L (ref 23–29)
CO2 SERPL-SCNC: 26 MMOL/L (ref 23–29)
CREAT SERPL-MCNC: 10.1 MG/DL (ref 0.5–1.4)
CREAT SERPL-MCNC: 4 MG/DL (ref 0.5–1.4)
DIFFERENTIAL METHOD: ABNORMAL
EOSINOPHIL # BLD AUTO: 0.1 K/UL (ref 0–0.5)
EOSINOPHIL # BLD AUTO: 0.2 K/UL (ref 0–0.5)
EOSINOPHIL # BLD AUTO: 0.3 K/UL (ref 0–0.5)
EOSINOPHIL NFR BLD: 0.8 % (ref 0–8)
EOSINOPHIL NFR BLD: 1.2 % (ref 0–8)
EOSINOPHIL NFR BLD: 3.2 % (ref 0–8)
ERYTHROCYTE [DISTWIDTH] IN BLOOD BY AUTOMATED COUNT: 17 % (ref 11.5–14.5)
ERYTHROCYTE [DISTWIDTH] IN BLOOD BY AUTOMATED COUNT: 17 % (ref 11.5–14.5)
ERYTHROCYTE [DISTWIDTH] IN BLOOD BY AUTOMATED COUNT: 17.2 % (ref 11.5–14.5)
EST. GFR  (NO RACE VARIABLE): 14 ML/MIN/1.73 M^2
EST. GFR  (NO RACE VARIABLE): 4.6 ML/MIN/1.73 M^2
GLUCOSE SERPL-MCNC: 128 MG/DL (ref 70–110)
GLUCOSE SERPL-MCNC: 95 MG/DL (ref 70–110)
HCT VFR BLD AUTO: 22.7 % (ref 40–54)
HCT VFR BLD AUTO: 23 % (ref 40–54)
HCT VFR BLD AUTO: 24.6 % (ref 40–54)
HGB BLD-MCNC: 7.5 G/DL (ref 14–18)
HGB BLD-MCNC: 7.7 G/DL (ref 14–18)
HGB BLD-MCNC: 8.1 G/DL (ref 14–18)
IMM GRANULOCYTES # BLD AUTO: 0.03 K/UL (ref 0–0.04)
IMM GRANULOCYTES # BLD AUTO: 0.04 K/UL (ref 0–0.04)
IMM GRANULOCYTES # BLD AUTO: 0.04 K/UL (ref 0–0.04)
IMM GRANULOCYTES NFR BLD AUTO: 0.3 % (ref 0–0.5)
IMM GRANULOCYTES NFR BLD AUTO: 0.3 % (ref 0–0.5)
IMM GRANULOCYTES NFR BLD AUTO: 0.4 % (ref 0–0.5)
LYMPHOCYTES # BLD AUTO: 0.8 K/UL (ref 1–4.8)
LYMPHOCYTES # BLD AUTO: 0.9 K/UL (ref 1–4.8)
LYMPHOCYTES # BLD AUTO: 1.1 K/UL (ref 1–4.8)
LYMPHOCYTES NFR BLD: 10.9 % (ref 18–48)
LYMPHOCYTES NFR BLD: 7.1 % (ref 18–48)
LYMPHOCYTES NFR BLD: 7.5 % (ref 18–48)
MAGNESIUM SERPL-MCNC: 2.3 MG/DL (ref 1.6–2.6)
MCH RBC QN AUTO: 29.1 PG (ref 27–31)
MCH RBC QN AUTO: 29.5 PG (ref 27–31)
MCH RBC QN AUTO: 29.8 PG (ref 27–31)
MCHC RBC AUTO-ENTMCNC: 32.9 G/DL (ref 32–36)
MCHC RBC AUTO-ENTMCNC: 33 G/DL (ref 32–36)
MCHC RBC AUTO-ENTMCNC: 33.5 G/DL (ref 32–36)
MCV RBC AUTO: 87 FL (ref 82–98)
MCV RBC AUTO: 89 FL (ref 82–98)
MCV RBC AUTO: 90 FL (ref 82–98)
MONOCYTES # BLD AUTO: 1.3 K/UL (ref 0.3–1)
MONOCYTES # BLD AUTO: 1.5 K/UL (ref 0.3–1)
MONOCYTES # BLD AUTO: 1.5 K/UL (ref 0.3–1)
MONOCYTES NFR BLD: 10.7 % (ref 4–15)
MONOCYTES NFR BLD: 14 % (ref 4–15)
MONOCYTES NFR BLD: 14.6 % (ref 4–15)
NEUTROPHILS # BLD AUTO: 10 K/UL (ref 1.8–7.7)
NEUTROPHILS # BLD AUTO: 7.2 K/UL (ref 1.8–7.7)
NEUTROPHILS # BLD AUTO: 8.5 K/UL (ref 1.8–7.7)
NEUTROPHILS NFR BLD: 70.8 % (ref 38–73)
NEUTROPHILS NFR BLD: 77.5 % (ref 38–73)
NEUTROPHILS NFR BLD: 80.1 % (ref 38–73)
NRBC BLD-RTO: 0 /100 WBC
PHOSPHATE SERPL-MCNC: 6.3 MG/DL (ref 2.7–4.5)
PLATELET # BLD AUTO: 183 K/UL (ref 150–450)
PLATELET # BLD AUTO: 197 K/UL (ref 150–450)
PLATELET # BLD AUTO: 200 K/UL (ref 150–450)
PMV BLD AUTO: 11 FL (ref 9.2–12.9)
PMV BLD AUTO: 11.1 FL (ref 9.2–12.9)
PMV BLD AUTO: 11.2 FL (ref 9.2–12.9)
POCT GLUCOSE: 85 MG/DL (ref 70–110)
POTASSIUM SERPL-SCNC: 4.2 MMOL/L (ref 3.5–5.1)
POTASSIUM SERPL-SCNC: 6.4 MMOL/L (ref 3.5–5.1)
RBC # BLD AUTO: 2.54 M/UL (ref 4.6–6.2)
RBC # BLD AUTO: 2.65 M/UL (ref 4.6–6.2)
RBC # BLD AUTO: 2.72 M/UL (ref 4.6–6.2)
SODIUM SERPL-SCNC: 133 MMOL/L (ref 136–145)
SODIUM SERPL-SCNC: 137 MMOL/L (ref 136–145)
WBC # BLD AUTO: 10.22 K/UL (ref 3.9–12.7)
WBC # BLD AUTO: 10.96 K/UL (ref 3.9–12.7)
WBC # BLD AUTO: 12.48 K/UL (ref 3.9–12.7)

## 2023-08-22 PROCEDURE — 25000003 PHARM REV CODE 250: Performed by: NURSE PRACTITIONER

## 2023-08-22 PROCEDURE — 93005 ELECTROCARDIOGRAM TRACING: CPT

## 2023-08-22 PROCEDURE — 63600175 PHARM REV CODE 636 W HCPCS: Mod: JZ,JG | Performed by: STUDENT IN AN ORGANIZED HEALTH CARE EDUCATION/TRAINING PROGRAM

## 2023-08-22 PROCEDURE — 25000003 PHARM REV CODE 250: Performed by: HOSPITALIST

## 2023-08-22 PROCEDURE — 83735 ASSAY OF MAGNESIUM: CPT | Performed by: STUDENT IN AN ORGANIZED HEALTH CARE EDUCATION/TRAINING PROGRAM

## 2023-08-22 PROCEDURE — 85025 COMPLETE CBC W/AUTO DIFF WBC: CPT | Performed by: STUDENT IN AN ORGANIZED HEALTH CARE EDUCATION/TRAINING PROGRAM

## 2023-08-22 PROCEDURE — 63600175 PHARM REV CODE 636 W HCPCS: Performed by: HOSPITALIST

## 2023-08-22 PROCEDURE — 99232 SBSQ HOSP IP/OBS MODERATE 35: CPT | Mod: GC,,, | Performed by: INTERNAL MEDICINE

## 2023-08-22 PROCEDURE — 99239 PR HOSPITAL DISCHARGE DAY,>30 MIN: ICD-10-PCS | Mod: ,,, | Performed by: STUDENT IN AN ORGANIZED HEALTH CARE EDUCATION/TRAINING PROGRAM

## 2023-08-22 PROCEDURE — 90935 PR HEMODIALYSIS, ONE EVALUATION: ICD-10-PCS | Mod: ,,, | Performed by: NURSE PRACTITIONER

## 2023-08-22 PROCEDURE — 99232 PR SUBSEQUENT HOSPITAL CARE,LEVL II: ICD-10-PCS | Mod: GC,,, | Performed by: INTERNAL MEDICINE

## 2023-08-22 PROCEDURE — 90935 HEMODIALYSIS ONE EVALUATION: CPT | Mod: ,,, | Performed by: NURSE PRACTITIONER

## 2023-08-22 PROCEDURE — 93010 ELECTROCARDIOGRAM REPORT: CPT | Mod: ,,, | Performed by: INTERNAL MEDICINE

## 2023-08-22 PROCEDURE — 84520 ASSAY OF UREA NITROGEN: CPT | Mod: 91 | Performed by: NURSE PRACTITIONER

## 2023-08-22 PROCEDURE — 99239 HOSP IP/OBS DSCHRG MGMT >30: CPT | Mod: ,,, | Performed by: STUDENT IN AN ORGANIZED HEALTH CARE EDUCATION/TRAINING PROGRAM

## 2023-08-22 PROCEDURE — 80048 BASIC METABOLIC PNL TOTAL CA: CPT | Performed by: STUDENT IN AN ORGANIZED HEALTH CARE EDUCATION/TRAINING PROGRAM

## 2023-08-22 PROCEDURE — 93010 EKG 12-LEAD: ICD-10-PCS | Mod: ,,, | Performed by: INTERNAL MEDICINE

## 2023-08-22 PROCEDURE — 84100 ASSAY OF PHOSPHORUS: CPT | Performed by: STUDENT IN AN ORGANIZED HEALTH CARE EDUCATION/TRAINING PROGRAM

## 2023-08-22 PROCEDURE — 90935 HEMODIALYSIS ONE EVALUATION: CPT

## 2023-08-22 RX ADMIN — SODIUM CHLORIDE: 9 INJECTION, SOLUTION INTRAVENOUS at 08:08

## 2023-08-22 RX ADMIN — LIDOCAINE AND PRILOCAINE: 25; 25 CREAM TOPICAL at 08:08

## 2023-08-22 RX ADMIN — PENICILLIN G BENZATHINE 2.4 MILLION UNITS: 1200000 INJECTION, SUSPENSION INTRAMUSCULAR at 05:08

## 2023-08-22 RX ADMIN — DEXTROSE MONOHYDRATE 10 MILLION UNITS: 5 INJECTION, SOLUTION INTRAVENOUS at 05:08

## 2023-08-22 RX ADMIN — CALCIUM ACETATE 667 MG: 667 CAPSULE ORAL at 05:08

## 2023-08-22 RX ADMIN — Medication 1 APPLICATOR: at 01:08

## 2023-08-22 NOTE — NURSING
Pt AAO x 4, no c/o pain. PICC line to right upper arm removed by OC per MD order. PICC catheter intact. Gauze dressing with tegaderm placed over site with pressure held for 5 min. No s/s of bleeding. Pt tolerated removal well. Family at the bedside. Monitoring.

## 2023-08-22 NOTE — PLAN OF CARE
Problem: Adult Inpatient Plan of Care  Goal: Plan of Care Review  Outcome: Ongoing, Progressing  Goal: Patient-Specific Goal (Individualized)  Outcome: Ongoing, Progressing  Goal: Absence of Hospital-Acquired Illness or Injury  Outcome: Ongoing, Progressing  Goal: Optimal Comfort and Wellbeing  Outcome: Ongoing, Progressing  Goal: Readiness for Transition of Care  Outcome: Ongoing, Progressing     Problem: Infection  Goal: Absence of Infection Signs and Symptoms  Outcome: Ongoing, Progressing     Problem: Fall Injury Risk  Goal: Absence of Fall and Fall-Related Injury  Outcome: Ongoing, Progressing     Problem: Device-Related Complication Risk (Hemodialysis)  Goal: Safe, Effective Therapy Delivery  Outcome: Ongoing, Progressing     Problem: Hemodynamic Instability (Hemodialysis)  Goal: Effective Tissue Perfusion  Outcome: Ongoing, Progressing     Problem: Infection (Hemodialysis)  Goal: Absence of Infection Signs and Symptoms  Outcome: Ongoing, Progressing      known b/l breast mass -- "cancer"

## 2023-08-22 NOTE — PLAN OF CARE
Patient alert and oriented, no acute event during the day. Stabdby assist to the bedside commode with urinal at bedside. Patient denies CP, SOB, N/V, headache. No BM during the course of the shift and no account of bleeding. Last Hgb was 7.5 and a critical result from lab for Hgb this AM was 5.4 with Hct of 15.6. Acknowledged results and ordered a redraw. MD notified and day shift nurse notified. Plan of care reviewed with patient during lab recollect.       Problem: Adult Inpatient Plan of Care  Goal: Plan of Care Review  Outcome: Ongoing, Progressing  Goal: Patient-Specific Goal (Individualized)  Outcome: Ongoing, Progressing  Goal: Absence of Hospital-Acquired Illness or Injury  Outcome: Ongoing, Progressing  Goal: Optimal Comfort and Wellbeing  Outcome: Ongoing, Progressing  Goal: Readiness for Transition of Care  Outcome: Ongoing, Progressing

## 2023-08-22 NOTE — PROGRESS NOTES
"[facility] H[facility]IAL[facility]IS NOTE    Margaret Hernández Jr. is a 83 y.o. male currently on hemodialysis for removal of uremic toxins and volume.     Patient seen and evaluated on hemodialysis, tolerating treatment, see HD flowsheet for vitals and assessments.    No Hypotension, chest pain, shortness of breath, cramping, nausea or vomiting.      Labs have been reviewed and the dialysate bath has been adjusted.     Labs:     Recent Labs   Lab 08/18/23  2145 08/19/23  0533 08/20/23  1431 08/21/23  0402 08/22/23  0543      < > 135* 136 133*   K 5.8*   < > 5.0 5.0 6.4*   CL 95   < > 99 95 94*   CO2 26   < > 21* 21* 18*   BUN 85*   < > 77* 83* 95*   CREATININE 9.5*   < > 7.4* 7.6* 10.1*   CALCIUM 9.0   < > 9.1 9.0 8.9   PHOS 4.6*  --   --  5.2* 6.3*    < > = values in this interval not displayed.     Recent Labs   Lab 08/21/23  1032 08/22/23  0105 08/22/23  0735   WBC 11.48 10.22 12.48   HGB 8.0* 7.5* 8.1*   HCT 24.6* 22.7* 24.6*    183 200     Lab Results   Component Value Date    FESATURATED 12 (L) 08/18/2023    FERRITIN 2,625 (H) 08/18/2023        Assessment/Plan      Ultrafiltration goal: Liters: 1L. Duration: 3 hours    Tuesday/Thursday/Saturday    - Seen on dialysis today, tolerating session with current UFR, no complications.    - sp EGD and C-scope w/ GI 8/21. "Notable for diverticulosis in ascending colon, nonbleeding polyps in entire colon and resected." Hgb 8.1 today.   - Continue Furosemide   - K 6.4 - potassium bath adjusted. Treatment extended by 30 minutes for additional cleaning.   - No lab stick/BP intake on access site  - Continue to monitor intake and output, daily weights   - Please avoid gadolinium, fleets, phos-based laxatives, NSAIDs  - Will follow closely and continue dialysis treatments while in-patient    Anemia  - Hgb 8.1  - Will plan for Epogen with HD     BMM  - Renal diet with protein intake goal 1.5 g/kg/d if appropriate   - Novasource with meals   - F/U PO4, Mg, Calcium. And " albumin levels.   - Phos 6.3. Please continue Phos Lo.    HTN  - BP Elevated  - Goal for BP <140mmHg SBP and <90 mmHg DBP. Maintain MAP > 65 mmHg.  - Continue home antihypertensive regimen; adjust as needed.       Justyna Rico DNP, APRN, FNP-C  Nephrology Department  Pager:  [phone]

## 2023-08-22 NOTE — ASSESSMENT & PLAN NOTE
Patient has known LVEF 30-35% from TTE 03/2023 and WERNER 04/2023. Etiology may be ischemic cardiomyopathy but we don't have any coronary angiogram or stress test in our system to prove otherwise. Patient will benefit from an ischemic workup to evaluate the etiology of his cardiomyopathy after acute bleed is addressed.    Repeat TTE 8/21:   Interpretation Summary    Left Ventricle: The left ventricle is normal in size. Ventricular mass is normal. Normal wall thickness. Mild global hypokinesis present. There is mildly reduced systolic function. Ejection fraction by visual approximation is low to mid 40s..    Right Ventricle: Normal right ventricular cavity size. Systolic function is borderline low.    Aortic Valve: The aortic valve is a trileaflet valve. There is moderate aortic valve sclerosis. Moderate annular calcification. Mildly restricted motion. There is mild stenosis. Aortic valve area by VTI is 1.86 cm². Aortic valve peak velocity is 1.90 m/s. Mean gradient is 9 mmHg. The dimensionless index is 0.49. There is mild aortic regurgitation.    Mitral Valve: There is mild bileaflet sclerosis. There is mild to moderate regurgitation.    Tricuspid Valve: There is moderate regurgitation.    Pulmonary Artery: There is moderate pulmonary hypertension. The estimated pulmonary artery systolic pressure is 54 mmHg.    IVC/SVC: Intermediate venous pressure at 8 mmHg.      - If LVEF 40-45% consistent with HFmrEF       - Already on carvedilol, can continue      - Consider starting ACE/ARB/ARNI if heart failure symptoms develop otherwise no strong indication w/ moderately reduced EF.      - MRA would not be indicated considering ESRD status      - SGLT2-I not indicated with current available data considering ESRD status  - No need for outpatient EP follow-up given not a candidate for ICD w/ EF 40%

## 2023-08-22 NOTE — NURSING
Pt completed 3.5 hours of HD. Tolerated well. VSS. NAD. Net 1 liter fluid removed. Blood returned. Lines disconnected and flushed with NS. Needles pulled and manual pressure held until hemostasis achieved- arterial site bled x20 min. Venous site bled x10 min.     1216- blood glucose 85.    1303- report given to REMI Judge.    1327- pt off unit via stretcher with transporter back to inpatient room.

## 2023-08-22 NOTE — DISCHARGE SUMMARY
[facility] ([place] [address])  [facility] Medicine  Discharge Summary      Patient Name: Margaret Hernández Jr.  MRN: [id number]  FREDERIC: [id number]  Patient Class: IP- Inpatient  Admission Date: 8/18/2023  Hospital Length of Stay: 4 days  Discharge Date and Time:  08/22/2023 3:47 PM  Attending Physician: Marsha Dixon MD   Discharging Provider: Marsha Dixon MD  Primary Care Provider: Stephanie Hernandez NP  [facility] Medicine [place] [facility] [place] [facility] [place] Marsha Dixon MD  [place] Care Team[place] [facility] [place] [facility]    HPI:   TRANSFER CENTER PHYSICIAN SUMMARY  83-year-old male with a history of anemia, end-stage renal disease on hemodialysis (most recent dialysis on August 16), HFrEF, atrial flutter (on Eliquis) with DCCV, syphilis (treated in 2016), hyperlipidemia, hypertension, glaucoma, and recent admission to [facility] for 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  No diaphoresis, dyspnea, or nausea/vomiting noted.  EKG had ST elevation in AVR with depressions in inferior, anterior, and lateral leads.  Findings were felt to be consistent with STEMI.  He received sublingual nitroglycerin, aspirin, ticagrelor, and heparin bolus/infusion.  He was subsequently transferred to the [facility].     At [facility] ED - He was seen by Cardiology in the emergency department, and it was noted that his hemoglobin was 6.1.  It was felt that his findings were more likely a type 2 NSTEMI.  Heparin was discontinued, and 2 units packed red blood cells were given.  Troponin levels were 0.064 with repeat 0.094.  Stool for occult blood was positive, and he did report dark stool recently.  He is having intermittent chest pain but appears comfortable. In the emergency department he received sublingual nitroglycerin and IV morphine. With the symptomatic anemia, cardiac issues, and heme positive stool, ED spoke with  "Cardiology at [facility].  After reviewing the case was felt that urgent cardiac catheterization was not indicated.  Will plan transfer to Hospital Medicine at [facility] in step-down status for further treatment of symptomatic anemia/possible GI bleed.     Previous admissions this year at outside hospitals:  -March 1-2 with concern for temporal arteritis.  Treated with steroids with plans for temporal artery biopsy.  He subsequently had outpatient temporal artery biopsy on March 7 that had no significant increase in inflammation.  He was in the process of weaning off steroids.    -March 27-29 with atrial flutter treated medically.  -April 11 for elective cardioversion of atrial flutter.  He had no intracardiac thrombus on WERNER, and he was cardioverted to sinus rhythm.  He was treated with Eliquis.  -April 26-27 with chest pain/NSTEMI due showed FT depression in leads I, V4, V5, and V6 with ST elevation in AVR.  It was noted that these findings were seen on previous EKGs.  Hemoglobin was noted to be 8.3, and he received packed red blood cells with dialysis.  -April 8-10 with concern for neurosyphilis.  Infectious Diseases recommended lumbar puncture and PICC line placement for IV penicillin to treat suspected neurosyphilis.  He had lumbar puncture on August 8.  Recommendation was for 14 days of IV penicillin (end date around August 22).       BEDSIDE EVAL    Patient seen these evening, he is in bed in no distress, reports no acute complaints, reviewed events of today and plan for hospitalization.  He received 2 units PRBC prior to arrival - he is on 2L NC which is new.  At bedside removed Oxygen since patient on continuous bedside monitor initially sats no lower than 95 but patient visibly uncomfortable after oxygen removed, asked to sit up and then be reclined, stated he felt tighntess or "cold" in his throat.   His chest pain he initially presented with is improved but not fully resolved, reports " symptom as minor.     He reports seeing dark/black stools in recent days, denies any hematemesis, no abdominal pain, taking eliquis as outpatient.  Last dose of Eliquis was Wed 8/16    He denies prior hx of MI or any prior stent placements.   Repeat EKG is obtained and his ST depressions have resolved, sTAT labs obtained and Potassium is 5.8,  T-waves more hyperacute than last EKG performed today.  NO St elevations noted.  Patient lives with wife, performs all ADL at home, does not use assistive device, not working currenty.  Denies any Tobacco/ETOH/Drug use.       Procedure(s) (LRB):  EGD (ESOPHAGOGASTRODUODENOSCOPY) (N/A)  COLONOSCOPY (N/A)      Hospital Course:   Transfer from OSH evaluation in setting of acute blood loss anemia associated with demand ischemia.  Chest pain with resolution following PRBC transfusions.  Troponin with peak.  Per cardio evaluation, echo, stop anti thrombotic and anti-platelet therapy. LDL at target w/o statin therapy. GDMT as tolerated. Echo EF mid 40s (slightly improved from prior); mild global hypokinesis, mod pHTN. Per Cards, Consider starting ACE/ARB/ARNI if heart failure symptoms develop otherwise no strong indication w/ moderately reduced EF. Hgb w/ ongoing downtrend in setting of ongoing melena. Required PRBC transfusions. S/p EGD and C-scope w/ GI 8/21. Notable for diverticulosis in ascending colon, nonbleeding polyps in entire colon and resected.  Eliquis resumed. Melena resolved. Hgb stable.  Discussed with patient's outpatient ID provider at [facility] (Candice Mejia): d/c PCN infusion, and administer Bicillin 2.4 million IM; Pt to follow with her weekly for 4 weeks. Patient without further hospitalization needs.  Patient medically stable for discharge.  Return precautions advised; patient's questions answered.  Amb Cards. GI consented and to arrange for out-patient video capsule. ID arranging weekly follow-up.  Patient in agreement with discharge plan.    Vitals:    08/22/23  1300 08/22/23 1345 08/22/23 1421 08/22/23 1519   BP: (!) 188/73 (!) 147/67     BP Location: Right leg      Patient Position: Lying      Pulse: 82 88  81   Resp: 18      Temp: 98.3 °F (36.8 °C)      TempSrc: Oral      SpO2:  99% 99%    Weight:       Height:         Physical Exam  Vitals and nursing note reviewed.   Constitutional:       Appearance: He is not toxic-appearing or diaphoretic.      Interventions: Nasal cannula in place.      Comments: RUE PICC LINE   HENT:      Head: Normocephalic and atraumatic.      Mouth/Throat:      Mouth: Mucous membranes are moist.      Pharynx: Oropharynx is clear.   Eyes:      General: No scleral icterus.        Right eye: No discharge.         Left eye: No discharge.   Cardiovascular:      Rate and Rhythm: Normal rate.      Pulses: Normal pulses.      Heart sounds: Murmur heard.      Comments: LUE AVF with thrill/bruit  Pulmonary:      Effort: Pulmonary effort is normal. No respiratory distress.      Breath sounds: Normal breath sounds. No wheezing or rales.   Abdominal:      General: Bowel sounds are normal. There is no distension.      Palpations: Abdomen is soft.      Tenderness: There is no abdominal tenderness. There is no guarding or rebound.   Musculoskeletal:      Cervical back: Neck supple. No tenderness.      Right lower leg: No edema.      Left lower leg: No edema.   Lymphadenopathy:      Cervical: No cervical adenopathy.   Skin:     General: Skin is warm.      Findings: No rash.   Neurological:      General: No focal deficit present.      Mental Status: He is alert and oriented to person, place, and time.   Psychiatric:         Behavior: Behavior normal.          Goals of Care Treatment Preferences:  Code Status: Full Code      Consults:   Consults (From admission, onward)        Status Ordering Provider     Inpatient consult to Cardiology  Once        Provider:  (Not yet assigned)    Completed RAJI NUNEZ     Inpatient consult to Gastroenterology  Once         Provider:  (Not yet assigned)    RAJI Henry     Inpatient consult to Nephrology  Once        Provider:  (Not yet assigned)    Completed RAJI NUNEZ          No new Assessment & Plan notes have been filed under this hospital service since the last note was generated.  Service: Hospital Medicine    Final Active Diagnoses:    Diagnosis Date Noted POA    PRINCIPAL PROBLEM:  Acute blood loss anemia [D62] 10/24/2022 Yes    Acute respiratory failure with hypoxia [J96.01] 08/19/2023 Yes    Hyperkalemia [E87.5] 08/18/2023 Yes    Neurosyphilis in adult [A52.3] 08/08/2023 Yes    HFrEF (heart failure with reduced ejection fraction) [I50.20] 03/29/2023 Yes    Atrial flutter [I48.92] 03/27/2023 Yes    Type II NSTEMI (demand ischemia) [I21.4] 10/24/2022 Yes    Type 2 diabetes mellitus with chronic kidney disease on chronic dialysis, without long-term current use of insulin [E11.22, N18.6, Z99.2] 01/12/2021 Not Applicable    ESRD on dialysis [N18.6, Z99.2] 04/09/2018 Not Applicable    Anemia of chronic renal failure [N18.9, D63.1] 03/24/2017 Yes     Chronic    Primary hypertension [I10] 10/17/2014 Yes     Chronic      Problems Resolved During this Admission:       Discharged Condition: stable    Disposition: Home or Self Care    Follow Up:    Patient Instructions:      Diet diabetic     Diet Cardiac     Notify your health care provider if you experience any of the following:  temperature >100.4     Notify your health care provider if you experience any of the following:  persistent nausea and vomiting or diarrhea     Notify your health care provider if you experience any of the following:  difficulty breathing or increased cough     Notify your health care provider if you experience any of the following:  severe persistent headache     Notify your health care provider if you experience any of the following:  persistent dizziness, light-headedness, or visual disturbances     Notify your health  "care provider if you experience any of the following:  increased confusion or weakness     Activity as tolerated       Significant Diagnostic Studies: Labs:   CMP   Recent Labs   Lab 08/21/23  0402 08/22/23  0543 08/22/23  0840 08/22/23  1220 08/22/23  1426    133*  --   --  137   K 5.0 6.4*  --   --  4.2   CL 95 94*  --   --  97   CO2 21* 18*  --   --  26   * 95  --   --  128*   BUN 83* 95* 93* 19 25*   CREATININE 7.6* 10.1*  --   --  4.0*   CALCIUM 9.0 8.9  --   --  8.6*   ANIONGAP 20* 21*  --   --  14   , CBC   Recent Labs   Lab 08/22/23  0105 08/22/23  0735 08/22/23  1220   WBC 10.22 12.48 10.96   HGB 7.5* 8.1* 7.7*   HCT 22.7* 24.6* 23.0*    200 197   , Troponin   Recent Labs   Lab 08/19/23  0533 08/19/23  1213 08/19/23  1700   TROPONINI 3.793* 4.207* 3.544*    and A1C:   Recent Labs   Lab 05/16/23  1338 08/19/23  0533   HGBA1C 5.0 5.1     Microbiology: Blood Culture No results found for: "LABBLOO"    Pending Diagnostic Studies:     None         Medications:  Reconciled Home Medications:      Medication List      CONTINUE taking these medications    apixaban 2.5 mg Tab  Commonly known as: ELIQUIS  Take 1 tablet (2.5 mg total) by mouth 2 (two) times daily.     b complex vitamins tablet  Take 1 tablet by mouth once daily.     blood sugar diagnostic Strp  To check BG 2 times daily, to use with insurance preferred meter True Metric     blood-glucose meter kit  To check BG 2 times daily, to use with insurance preferred meter True Metric     calcium acetate(phosphat bind) 667 mg capsule  Commonly known as: PHOSLO  TAKE ONE CAPSULE BY MOUTH THREE TIMES DAILY WITH MEALS     carvediloL 12.5 MG tablet  Commonly known as: COREG  Take 2 tablets (25 mg total) by mouth 2 (two) times daily.     CENTRUM SILVER MEN ORAL  Take by mouth.     CETAPHIL Crea  Generic drug: cetyl,stear.alcoh-prop gly-sls  Apply topically.     dorzolamide-timolol 2-0.5% 22.3-6.8 mg/mL ophthalmic solution  Commonly known as: " COSOPT  Place 1 drop into both eyes 2 (two) times daily.     fluticasone propionate 50 mcg/actuation nasal spray  Commonly known as: FLONASE  SHAKE LIQUID AND USE 1 SPRAY(50 MCG) IN EACH NOSTRIL EVERY DAY     furosemide 40 MG tablet  Commonly known as: LASIX  Take 1 tablet (40 mg total) by mouth 2 (two) times daily. Take on non dialysis days     lancets Misc  To check BG 2 times daily, to use with insurance preferred meter True Metric     LIDOcaine-prilocaine cream  Commonly known as: EMLA  APPLY SMALL AMOUNT TO ACCESS SITE 1-2 HOURS BEFORE TREATMENT. COVER AREA WITH AN OCCLUSIVE DRESSING AS DIRECTED.        STOP taking these medications    dextrose 5 % (D5W) SolP 100 mL with penicillin G potassium 5 million unit SolR 8 Million Units            Indwelling Lines/Drains at time of discharge:   Lines/Drains/Airways     Peripherally Inserted Central Catheter Line  Duration           PICC Single Lumen 08/08/23 1421 right basilic 14 days          Central Venous Catheter Line  Duration                Hemodialysis AV Graft Left upper arm -- days          Drain  Duration                Hemodialysis AV Fistula 03/27/23 1911 Left upper arm 147 days                Time spent on the discharge of patient: 45 minutes         Marsha Dixon MD  Department of Hospital Medicine  [facility] - Intensive Care ([place]

## 2023-08-22 NOTE — PLAN OF CARE
Barry from patient access is sending a message to the office of his PCP   They will contact the patient with a date.

## 2023-08-22 NOTE — NURSING
Pt D/C home WC van, PICC removed by RN. PIV removed by this nurse. D/C paperwork reviewed with pt, pt voiced understanding.

## 2023-08-22 NOTE — NURSING
Pt arrived to [facility] via stretcher in NAD, VSS, AAOx4. Pt arrived with penicillin running to PIV. Pt ambulated to bed. Bed scale weight obtained.  Arrived on central tele. Pt presents for maintenance hemodialysis on off day (normal MWF).  EMLA cream applied to SHERRIE AVG at 0808.  Left upper arm AVG cannulated with 15g needles x2 using aseptic technique. Lines connected and secured- tx initiated at 0843.

## 2023-08-22 NOTE — NURSING
Pt AAO x 4, no c/o pain @ this time, returned from HD. Resting in bed on RA no SOB at this time. HD fistula to left upper arm thrill and bruit present. PICC and PIV to right arm clean, dry, intact, no redness or swelling, flushed and PIV is saline locked PICC is infusing. Skin clean dry, and intact. Pt to D/C home today. Bed low and locked, call light in reach. Monitoring.

## 2023-08-22 NOTE — PLAN OF CARE
[facility] ([facility])  Discharge Final Note    Primary Care Provider: Stephanie Hernandez NP    Expected Discharge Date: 8/22/2023    SW went to speak with pt about d/c; pt was not in room.    Pt returned to room.  SW ordered wheelchair van transport for pt scheduled for NOW.  Scheduled time does not mean  time.      [facility]    Pt d/c home with no needs    Future Appointments   Date Time Provider Department Center   8/24/2023  2:00 PM [facility]   8/30/2023 11:30 AM Candice Mejia MD [facility] INFECT JULIA SCC   8/31/2023 10:30 AM Ian Garza MD [facility] RHEUM JULIA ACT   9/19/2023 11:10 AM Martin Alston MD [facility] CARDIO JULIA 3RD FL   9/19/2023 12:00 PM Yovany Sol MD [facility] GENSURG JULIA GREEN   12/21/2023 10:15 [facility]   12/21/2023 11:00 AM OPHTHALMOLOGY GLAUCOMA, [facility] OPHTHAL JULIA GREEN             Final Discharge Note (most recent)       Final Note - 08/22/23 1046          Final Note    Assessment Type Final Discharge Note (P)      Anticipated Discharge Disposition Home or Self Care (P)         Post-Acute Status    Post-Acute Authorization Other (P)      Other Status No Post-Acute Service Needs (P)      Discharge Delays None known at this time (P)                    Abril Law CD, MSW, LMSW, RSW   Case Management  [facility]  Email: [email or web address]    Important Message from Medicare  Important Message from Medicare regarding Discharge Appeal Rights: Explained to patient/caregiver, Signed/date by patient/caregiver     Date IMM was signed: 08/21/23  Time IMM was signed: 1119

## 2023-08-22 NOTE — PROGRESS NOTES
[facility] ([place] [address])  Cardiology  Progress Note    Patient Name: Margaret Hernández Jr.  MRN: [id number]  Admission Date: 8/18/2023  Hospital Length of Stay: 4 days  Code Status: Full Code   Attending Physician: Marsha Dixon MD   Primary Care Physician: Stephanie Hernandez NP  Expected Discharge Date: 8/22/2023  Principal Problem:Acute blood loss anemia    Subjective:     Interval History: No acute overnight events. TTE performed 8/21 demonstrates slightly improved ejection fraction from prior, currently 40-45%. Apixaban resumed for patient's atrial flutter given no bleeding on colonoscopy and EGD yesterday.    Review of Systems   Constitutional: Negative for chills, decreased appetite, diaphoresis, fever, malaise/fatigue and weight gain.   HENT:  Negative for congestion and ear discharge.    Eyes:  Negative for blurred vision.   Cardiovascular:  Negative for chest pain, dyspnea on exertion, irregular heartbeat, leg swelling, orthopnea, palpitations and paroxysmal nocturnal dyspnea.   Respiratory:  Negative for cough, shortness of breath, snoring and sputum production.    Skin:  Negative for color change, dry skin and rash.   Musculoskeletal:  Negative for back pain, falls, joint pain and neck pain.   Gastrointestinal:  Negative for bloating, abdominal pain, constipation and diarrhea.   Genitourinary:  Negative for dysuria, flank pain, hematuria and hesitancy.   Neurological:  Negative for focal weakness, headaches, numbness and seizures.   Psychiatric/Behavioral:  Negative for altered mental status, depression and hallucinations.      Objective:     Vital Signs (Most Recent):  Temp: 98.1 °F (36.7 °C) (08/22/23 0319)  Pulse: 70 (08/22/23 0319)  Resp: 18 (08/22/23 0319)  BP: (!) 171/69 (08/22/23 0319)  SpO2: 95 % (08/22/23 0319) Vital Signs (24h Range):  Temp:  [97 °F (36.1 °C)-98.2 °F (36.8 °C)] 98.1 °F (36.7 °C)  Pulse:  [60-79] 70  Resp:  [11-18] 18  SpO2:  [90 %-100 %] 95 %  BP: (112-171)/(42-79)  171/69     Weight: 60.5 kg (133 lb 6.1 oz)  Body mass index is 20.88 kg/m².     SpO2: 95 %         Intake/Output Summary (Last 24 hours) at 8/22/2023 0904  Last data filed at 8/21/2023 1646  Gross per 24 hour   Intake 290 ml   Output --   Net 290 ml       Lines/Drains/Airways       Peripherally Inserted Central Catheter Line  Duration             PICC Single Lumen 08/08/23 1421 right basilic 13 days              Central Venous Catheter Line  Duration                  Hemodialysis AV Graft Left upper arm -- days              Peripheral Intravenous Line  Duration                  Hemodialysis AV Fistula 03/27/23 1911 Left upper arm 147 days         Peripheral IV - Single Lumen 08/18/23 0905 18 G Anterior;Right Forearm 3 days                     Physical Exam  Constitutional:       General: He is not in acute distress.     Appearance: He is not ill-appearing.   HENT:      Nose: Nose normal.      Mouth/Throat:      Mouth: Mucous membranes are moist.   Eyes:      Pupils: Pupils are equal, round, and reactive to light.   Neck:      Comments: No JVD appreciated   Cardiovascular:      Rate and Rhythm: Normal rate and regular rhythm.      Pulses: Normal pulses.      Heart sounds: Murmur (Grade 2/6 systolic crescendo-decrescendo murmur w/ radiation to carotids) heard.   Pulmonary:      Effort: Pulmonary effort is normal. No respiratory distress.      Breath sounds: No wheezing or rales.   Abdominal:      General: Abdomen is flat. There is no distension.      Palpations: Abdomen is soft.      Tenderness: There is no right CVA tenderness or left CVA tenderness.   Musculoskeletal:         General: No swelling.      Cervical back: Normal range of motion and neck supple. No tenderness.      Right lower leg: No edema.      Left lower leg: No edema.      Comments: LUE AV fistula with good thrill   Skin:     General: Skin is warm.      Coloration: Skin is not pale.      Findings: No rash.   Neurological:      General: No focal  deficit present.      Mental Status: He is alert and oriented to person, place, and time.      Motor: No weakness.         Significant Labs: CMP   Recent Labs   Lab 08/20/23  1431 08/21/23  0402 08/22/23  0543   * 136 133*   K 5.0 5.0 6.4*   CL 99 95 94*   CO2 21* 21* 18*   * 115* 95   BUN 77* 83* 95*   CREATININE 7.4* 7.6* 10.1*   CALCIUM 9.1 9.0 8.9   ANIONGAP 15 20* 21*    and CBC   Recent Labs   Lab 08/21/23  1032 08/22/23  0105 08/22/23  0735   WBC 11.48 10.22 12.48   HGB 8.0* 7.5* 8.1*   HCT 24.6* 22.7* 24.6*    183 200       Significant Imaging: EKG: ST depressions V3-V6, no hyperkalemic changes.    Assessment and Plan:       HFrEF (heart failure with reduced ejection fraction)  Patient has known LVEF 30-35% from TTE 03/2023 and WERNER 04/2023. Etiology may be ischemic cardiomyopathy but we don't have any coronary angiogram or stress test in our system to prove otherwise. Patient will benefit from an ischemic workup to evaluate the etiology of his cardiomyopathy after acute bleed is addressed.    Repeat TTE 8/21:   Interpretation Summary    Left Ventricle: The left ventricle is normal in size. Ventricular mass is normal. Normal wall thickness. Mild global hypokinesis present. There is mildly reduced systolic function. Ejection fraction by visual approximation is low to mid 40s..    Right Ventricle: Normal right ventricular cavity size. Systolic function is borderline low.    Aortic Valve: The aortic valve is a trileaflet valve. There is moderate aortic valve sclerosis. Moderate annular calcification. Mildly restricted motion. There is mild stenosis. Aortic valve area by VTI is 1.86 cm². Aortic valve peak velocity is 1.90 m/s. Mean gradient is 9 mmHg. The dimensionless index is 0.49. There is mild aortic regurgitation.    Mitral Valve: There is mild bileaflet sclerosis. There is mild to moderate regurgitation.    Tricuspid Valve: There is moderate regurgitation.    Pulmonary Artery:  There is moderate pulmonary hypertension. The estimated pulmonary artery systolic pressure is 54 mmHg.    IVC/SVC: Intermediate venous pressure at 8 mmHg.      - If LVEF 40-45% consistent with HFmrEF       - Already on carvedilol, can continue      - Consider starting ACE/ARB/ARNI if heart failure symptoms develop otherwise no strong indication w/ moderately reduced EF.      - MRA would not be indicated considering ESRD status      - SGLT2-I not indicated with current available data considering ESRD status  - No need for outpatient EP follow-up given not a candidate for ICD w/ EF 40%    Atrial flutter  S/p DCCV 04/2023. Currently sinus rhythm. On carvedilol 12.5 mg BID at home.    -- Continue carvedilol 12.5 mg BID  -- AC with Eliquis 2.5 mg PO BID (age, Cr)    Type II NSTEMI (demand ischemia)  Patient is an 83 year old male with anemia, ESRD on HD, HFrEF( most recent EF 30%), atrial flutter on Eliquis status post DCCV April 2023, neurosyphilis, HLD, HTN presented to [facility] on August 18 with chest discomfort. Transferred to [place] with Cardiology consultation, and then transferred to [facility] for higher level of care. ECG 08/18/2023 at 6 AM independently reviewed, showed diffuse ST depressions in the anterior, inferior, and lateral leads with aVR elevation. ECG 08/19/2023 independently reviewed, showed NST, 1st degree AV block, and complete resolution of ST-T changes. Patient continues to report being angina free. Patient had troponin elevation 0.094 --> 2.88--> 3.79 --> 4.21. While biomarker elevation is concerning for underlying coronary artery disease, this presentation is not consistent with acute plaque rupture. This presentation is likely a Type II MI secondary to demand ischemia from acute blood loss anemia (Hgb 6.1 g/dL at OSH). GI upper and lower endoscopy 8/21 without evidence of GI bleed. Unclear down-trend in Hgb, would continue to monitor.    - Trend Hgb  - Recommend continuing anti-anginal  therapy with beta - blocker  - Patient is already at target LDL < 70 mg/dL (currently 58 mg/dL) without statin  - Consider isosorbide mononitrate if anginal symptoms worsen and BP tolerates      VTE Risk Mitigation (From admission, onward)         Ordered     apixaban tablet 2.5 mg  2 times daily         08/21/23 1259     heparin (porcine) injection 1,000 Units  As needed (PRN)         08/19/23 0718     Reason for No Pharmacological VTE Prophylaxis  Once        Question:  Reasons:  Answer:  Risk of Bleeding    08/18/23 2104     IP VTE HIGH RISK PATIENT  Once         08/18/23 2104     Place sequential compression device  Until discontinued         08/18/23 2104              Thank you for your consult. Cardiology will sign off at this time. Please re-consult for additional questions or change in clinical status.    Odilon Vazquez MD  Cardiology  [facility] - Intensive Care ([place]

## 2023-08-22 NOTE — ASSESSMENT & PLAN NOTE
S/p DCC 04/2023. Currently sinus rhythm. On carvedilol 12.5 mg BID at home.    -- Continue carvedilol 12.5 mg BID  -- AC with Eliquis 2.5 mg PO BID (age, Cr)

## 2023-08-22 NOTE — ASSESSMENT & PLAN NOTE
Patient is an 83 year old male with anemia, ESRD on HD, HFrEF( most recent EF 30%), atrial flutter on Eliquis status post DCCV April 2023, neurosyphilis, HLD, HTN presented to [facility] on August 18 with chest discomfort. Transferred to [place] with Cardiology consultation, and then transferred to [facility] for higher level of care. ECG 08/18/2023 at 6 AM independently reviewed, showed diffuse ST depressions in the anterior, inferior, and lateral leads with aVR elevation. ECG 08/19/2023 independently reviewed, showed NST, 1st degree AV block, and complete resolution of ST-T changes. Patient continues to report being angina free. Patient had troponin elevation 0.094 --> 2.88--> 3.79 --> 4.21. While biomarker elevation is concerning for underlying coronary artery disease, this presentation is not consistent with acute plaque rupture. This presentation is likely a Type II MI secondary to demand ischemia from acute blood loss anemia (Hgb 6.1 g/dL at OSH). GI upper and lower endoscopy 8/21 without evidence of GI bleed. Unclear down-trend in Hgb, would continue to monitor.    - Trend Hgb  - Recommend continuing anti-anginal therapy with beta - blocker  - Patient is already at target LDL < 70 mg/dL (currently 58 mg/dL) without statin  - Consider isosorbide mononitrate if anginal symptoms worsen and BP tolerates

## 2023-08-22 NOTE — SUBJECTIVE & OBJECTIVE
Interval History: No acute overnight events. TTE performed 8/21 demonstrates slightly improved ejection fraction from prior, currently 40-45%. Apixaban resumed for patient's atrial flutter given no bleeding on colonoscopy and EGD yesterday.    Review of Systems   Constitutional: Negative for chills, decreased appetite, diaphoresis, fever, malaise/fatigue and weight gain.   HENT:  Negative for congestion and ear discharge.    Eyes:  Negative for blurred vision.   Cardiovascular:  Negative for chest pain, dyspnea on exertion, irregular heartbeat, leg swelling, orthopnea, palpitations and paroxysmal nocturnal dyspnea.   Respiratory:  Negative for cough, shortness of breath, snoring and sputum production.    Skin:  Negative for color change, dry skin and rash.   Musculoskeletal:  Negative for back pain, falls, joint pain and neck pain.   Gastrointestinal:  Negative for bloating, abdominal pain, constipation and diarrhea.   Genitourinary:  Negative for dysuria, flank pain, hematuria and hesitancy.   Neurological:  Negative for focal weakness, headaches, numbness and seizures.   Psychiatric/Behavioral:  Negative for altered mental status, depression and hallucinations.      Objective:     Vital Signs (Most Recent):  Temp: 98.1 °F (36.7 °C) (08/22/23 0319)  Pulse: 70 (08/22/23 0319)  Resp: 18 (08/22/23 0319)  BP: (!) 171/69 (08/22/23 0319)  SpO2: 95 % (08/22/23 0319) Vital Signs (24h Range):  Temp:  [97 °F (36.1 °C)-98.2 °F (36.8 °C)] 98.1 °F (36.7 °C)  Pulse:  [60-79] 70  Resp:  [11-18] 18  SpO2:  [90 %-100 %] 95 %  BP: (112-171)/(42-79) 171/69     Weight: 60.5 kg (133 lb 6.1 oz)  Body mass index is 20.88 kg/m².     SpO2: 95 %         Intake/Output Summary (Last 24 hours) at 8/22/2023 0904  Last data filed at 8/21/2023 1646  Gross per 24 hour   Intake 290 ml   Output --   Net 290 ml       Lines/Drains/Airways       Peripherally Inserted Central Catheter Line  Duration             PICC Single Lumen 08/08/23 1421 right  basilic 13 days              Central Venous Catheter Line  Duration                  Hemodialysis AV Graft Left upper arm -- days              Peripheral Intravenous Line  Duration                  Hemodialysis AV Fistula 03/27/23 1911 Left upper arm 147 days         Peripheral IV - Single Lumen 08/18/23 0905 18 G Anterior;Right Forearm 3 days                     Physical Exam  Constitutional:       General: He is not in acute distress.     Appearance: He is not ill-appearing.   HENT:      Nose: Nose normal.      Mouth/Throat:      Mouth: Mucous membranes are moist.   Eyes:      Pupils: Pupils are equal, round, and reactive to light.   Neck:      Comments: No JVD appreciated   Cardiovascular:      Rate and Rhythm: Normal rate and regular rhythm.      Pulses: Normal pulses.      Heart sounds: Murmur (Grade 2/6 systolic crescendo-decrescendo murmur w/ radiation to carotids) heard.   Pulmonary:      Effort: Pulmonary effort is normal. No respiratory distress.      Breath sounds: No wheezing or rales.   Abdominal:      General: Abdomen is flat. There is no distension.      Palpations: Abdomen is soft.      Tenderness: There is no right CVA tenderness or left CVA tenderness.   Musculoskeletal:         General: No swelling.      Cervical back: Normal range of motion and neck supple. No tenderness.      Right lower leg: No edema.      Left lower leg: No edema.      Comments: LUE AV fistula with good thrill   Skin:     General: Skin is warm.      Coloration: Skin is not pale.      Findings: No rash.   Neurological:      General: No focal deficit present.      Mental Status: He is alert and oriented to person, place, and time.      Motor: No weakness.         Significant Labs: CMP   Recent Labs   Lab 08/20/23  1431 08/21/23  0402 08/22/23  0543   * 136 133*   K 5.0 5.0 6.4*   CL 99 95 94*   CO2 21* 21* 18*   * 115* 95   BUN 77* 83* 95*   CREATININE 7.4* 7.6* 10.1*   CALCIUM 9.1 9.0 8.9   ANIONGAP 15 20* 21*     and CBC   Recent Labs   Lab 08/21/23  1032 08/22/23  0105 08/22/23  0735   WBC 11.48 10.22 12.48   HGB 8.0* 7.5* 8.1*   HCT 24.6* 22.7* 24.6*    183 200       Significant Imaging: EKG: ST depressions V3-V6, no hyperkalemic changes.

## 2023-08-23 ENCOUNTER — TELEPHONE (OUTPATIENT)
Dept: GASTROENTEROLOGY | Facility: CLINIC | Age: 83
End: 2023-08-23
Payer: MEDICARE

## 2023-08-23 ENCOUNTER — PATIENT OUTREACH (OUTPATIENT)
Dept: ADMINISTRATIVE | Facility: CLINIC | Age: 83
End: 2023-08-23
Payer: MEDICARE

## 2023-08-23 DIAGNOSIS — D50.9 MICROCYTIC ANEMIA: Primary | ICD-10-CM

## 2023-08-23 NOTE — TELEPHONE ENCOUNTER
Message left for patient to return my call regarding scheduling a video capsule endoscopy.    Karoline

## 2023-08-23 NOTE — TELEPHONE ENCOUNTER
----- Message from Blanca Renner sent at 8/23/2023 10:03 AM CDT -----  Type:  Patient Returning Call    Who Called:pt  Who Left Message for Patient: Salima bauer   Does the patient know what this is regarding?:missed call  Would the patient rather a call back or a response via [facility]? call  Best Call Back Number:[phone]  Additional Information: pt states that they were returning missed call

## 2023-08-23 NOTE — PROGRESS NOTES
C3 nurse spoke with Margaret Hernández Jr.  for a TCC post hospital discharge follow up call. The patient has a scheduled HOSFU appointment with LAINE Marquez on 08/29/2023 @ [facility].    Message sent to PCP Staff

## 2023-08-28 VITALS
HEART RATE: 81 BPM | BODY MASS INDEX: 20.93 KG/M2 | OXYGEN SATURATION: 98 % | WEIGHT: 133.38 LBS | TEMPERATURE: 99 F | SYSTOLIC BLOOD PRESSURE: 147 MMHG | HEIGHT: 67 IN | RESPIRATION RATE: 20 BRPM | DIASTOLIC BLOOD PRESSURE: 67 MMHG

## 2023-08-29 ENCOUNTER — TELEPHONE (OUTPATIENT)
Dept: GASTROENTEROLOGY | Facility: CLINIC | Age: 83
End: 2023-08-29
Payer: MEDICARE

## 2023-08-30 PROBLEM — K92.1 MELENA: Status: ACTIVE | Noted: 2023-08-30

## 2023-08-31 PROBLEM — K31.7 MULTIPLE GASTRIC POLYPS: Status: ACTIVE | Noted: 2023-08-31

## 2023-08-31 PROBLEM — H61.23 HEARING LOSS OF BOTH EARS DUE TO CERUMEN IMPACTION: Status: ACTIVE | Noted: 2023-08-31

## 2023-09-05 ENCOUNTER — TELEPHONE (OUTPATIENT)
Dept: GASTROENTEROLOGY | Facility: CLINIC | Age: 83
End: 2023-09-05
Payer: MEDICARE

## 2023-09-05 NOTE — TELEPHONE ENCOUNTER
----- Message from Karma Shea MA sent at 8/31/2023 11:22 AM CDT -----  Regarding: RE: return call  Contact: @[phone]  Please call Tuesday  ----- Message -----  From: Abel Griggs  Sent: 8/31/2023  11:15 AM CDT  To: [facility] Clinical Staff  Subject: return call                                      Pt wife  is returning a missed call from linda ... in regards to sched appt ... please call and adv @[phone]

## 2023-09-05 NOTE — TELEPHONE ENCOUNTER
Spoke with patient and wife.  Scheduled for VCE on 9/26 at 7:30.   Prep instructions very briefly reviewed over phone.  Confirmation along with written prep instructions mailed to patient.  I asked him to call me if he does not receive the prep instructions by 9/13.                                 Patient had a return appointment scheduled to see  on 10/25 that has been cancelled since patient will be coming in on 9/26 for the VCE.   Patient does not like driving in [place].   Karoline

## 2023-09-17 PROBLEM — D64.9 SYMPTOMATIC ANEMIA: Status: ACTIVE | Noted: 2023-09-17

## 2023-09-25 ENCOUNTER — TELEPHONE (OUTPATIENT)
Dept: GASTROENTEROLOGY | Facility: CLINIC | Age: 83
End: 2023-09-25
Payer: MEDICARE

## 2023-09-25 NOTE — TELEPHONE ENCOUNTER
----- Message from Karma Shea MA sent at 9/25/2023  1:08 PM CDT -----  Regarding: RE: pt advice  Contact: adriana Ayers @[phone]    ----- Message -----  From: Mini Escobedo  Sent: 9/25/2023  12:43 PM CDT  To: Angie DAWKINS Staff  Subject: pt advice                                        Caller requesting clarification on when to start the prep. She is not sure to start at noon or 7PM. Pls call to discuss.

## 2023-09-25 NOTE — TELEPHONE ENCOUNTER
Spoke with patients wife.   All questions answered to her satisfaction regarding prep for VCE.   Karoline

## 2023-09-26 ENCOUNTER — CLINICAL SUPPORT (OUTPATIENT)
Dept: GASTROENTEROLOGY | Facility: CLINIC | Age: 83
End: 2023-09-26
Payer: MEDICARE

## 2023-09-26 ENCOUNTER — TELEPHONE (OUTPATIENT)
Dept: GASTROENTEROLOGY | Facility: CLINIC | Age: 83
End: 2023-09-26
Payer: MEDICARE

## 2023-09-26 DIAGNOSIS — D50.9 MICROCYTIC ANEMIA: ICD-10-CM

## 2023-09-26 PROCEDURE — 91110 GI TRC IMG INTRAL ESOPH-ILE: CPT | Mod: 26,GC,, | Performed by: INTERNAL MEDICINE

## 2023-09-26 PROCEDURE — 91110 PR GI TRACT CAPSULE ENDOSCOPY: ICD-10-PCS | Mod: 26,GC,, | Performed by: INTERNAL MEDICINE

## 2023-09-26 PROCEDURE — 99999 PR PBB SHADOW E&M-EST. PATIENT-LVL I: CPT | Mod: PBBFAC,,,

## 2023-09-26 PROCEDURE — 99999 PR PBB SHADOW E&M-EST. PATIENT-LVL I: ICD-10-PCS | Mod: PBBFAC,,,

## 2023-09-26 NOTE — TELEPHONE ENCOUNTER
Instructions for the day reviewed with patient and wife.  All questions answered to their satisfaction.  Patient swallowed capsule without incident.   Karoline

## 2023-09-28 ENCOUNTER — TELEPHONE (OUTPATIENT)
Dept: GASTROENTEROLOGY | Facility: CLINIC | Age: 83
End: 2023-09-28
Payer: MEDICARE

## 2023-09-28 NOTE — TELEPHONE ENCOUNTER
----- Message from Aydee Adams MD sent at 9/28/2023  4:01 PM CDT -----  Thanks so much for letting me know!  ----- Message -----  From: Salima Keys MA  Sent: 9/5/2023   3:29 PM CDT  To: Aydee Adams MD    Hi, I have spoken with patient and wife several times and they decided they do want Mr. Hernández to have the VCE.   They will make arrangements for him to come in on Tuesday 9/26 for the VCE.   Karoline  ----- Message -----  From: Aydee Adams MD  Sent: 8/31/2023   1:40 PM CDT  To: CARMEN Zuniga, he can follow up in clinic if unable to complete the capsule. Thanks  ----- Message -----  From: Salima Keys MA  Sent: 8/29/2023   3:32 PM CDT  To: Aydee Adams MD    H, after several attempts I spoke with  today.  He stated if the VCE cannot be done in [place] he cannot have it done. He stated he is 83 and he cannot drive to [place].  Is there something else that you can recommend?   Karoline  ----- Message -----  From: Blanca Renner  Sent: 8/23/2023  10:05 AM CDT  To: Salima Keys MA    Type:  Patient Returning Call    Who Called:pt  Who Left Message for Patient: Salima bauer   Does the patient know what this is regarding?:missed call  Would the patient rather a call back or a response via [facility]? call  Best Call Back Number:[phone]  Additional Information: pt states that they were returning missed call

## 2023-10-23 ENCOUNTER — OFFICE VISIT (OUTPATIENT)
Dept: URGENT CARE | Facility: CLINIC | Age: 83
End: 2023-10-23
Payer: MEDICARE

## 2023-10-23 VITALS
OXYGEN SATURATION: 96 % | HEIGHT: 67 IN | SYSTOLIC BLOOD PRESSURE: 164 MMHG | HEART RATE: 73 BPM | RESPIRATION RATE: 18 BRPM | BODY MASS INDEX: 20.72 KG/M2 | TEMPERATURE: 98 F | WEIGHT: 132 LBS | DIASTOLIC BLOOD PRESSURE: 57 MMHG

## 2023-10-23 DIAGNOSIS — M25.551 PAIN OF RIGHT HIP: Primary | ICD-10-CM

## 2023-10-23 DIAGNOSIS — S73.101A SPRAIN OF RIGHT HIP, INITIAL ENCOUNTER: ICD-10-CM

## 2023-10-23 PROCEDURE — 73502 XR HIP WITH PELVIS WHEN PERFORMED, 2 OR 3  VIEWS RIGHT: ICD-10-PCS | Mod: RT,S$GLB,, | Performed by: RADIOLOGY

## 2023-10-23 PROCEDURE — 99214 PR OFFICE/OUTPT VISIT, EST, LEVL IV, 30-39 MIN: ICD-10-PCS | Mod: S$GLB,,, | Performed by: FAMILY MEDICINE

## 2023-10-23 PROCEDURE — 73502 X-RAY EXAM HIP UNI 2-3 VIEWS: CPT | Mod: RT,S$GLB,, | Performed by: RADIOLOGY

## 2023-10-23 PROCEDURE — 99214 OFFICE O/P EST MOD 30 MIN: CPT | Mod: S$GLB,,, | Performed by: FAMILY MEDICINE

## 2023-10-23 RX ORDER — TRAMADOL HYDROCHLORIDE 50 MG/1
50 TABLET ORAL EVERY 12 HOURS PRN
Qty: 10 TABLET | Refills: 0 | Status: SHIPPED | OUTPATIENT
Start: 2023-10-23 | End: 2023-12-05

## 2023-10-23 RX ORDER — MELOXICAM 7.5 MG/1
7.5 TABLET ORAL DAILY
Qty: 20 TABLET | Refills: 0 | Status: SHIPPED | OUTPATIENT
Start: 2023-10-23 | End: 2023-12-05

## 2023-10-23 NOTE — PATIENT INSTRUCTIONS
.Please drink plenty of fluids.  Please get plenty of rest.  Please return here or go to the Emergency Department for any concerns or worsening of condition.  If you were prescribed a narcotic medication, do not drive or operate heavy equipment or machinery while taking these medications.  If you were not prescribed an anti-inflammatory medication, and if you do not have any history of stomach/intestinal ulcers, or kidney disease, or are not taking a blood thinner such as Coumadin, Plavix, Pradaxa, Eloquis, or Xaralta for example, it is OK to take over the counter Ibuprofen or Advil or Motrin or Aleve as directed.  Do not take these medications on an empty stomach.  Rest, ice, compression and elevation to the affected joint or limb as needed.    If you were given a sling, wear it for comfort until follow up as arranged.  If you were given or placed in a splint, wear it until your follow up visit or recheck.  If you  smoke, please stop smoking.       Please follow up with your primary care doctor or specialist as needed.    Stephanie Hernandez, NP  [phone]    You must understand that you have received treatment at an Urgent Care facility only, and that you may be  released before all of your medical problems are known or treated. Urgent Care facilities are not equipped to  handle life threatening emergencies. It is recommended that you seek care at an Emergency Department for  further evaluation of worsening or concerning symptoms, or possibly life threatening conditions as  discussed.

## 2023-10-23 NOTE — LETTER
October 23, 2023  Margaret Hernández JrKenny  [address] [address]                [facility]  [address] [place] [address] [place], [address]  [place] [address]  Phone: [phone]  Fax: [phone] Margaret Hernández was seen and treated in our Urgent Care department on 10/23/2023. He may return to work in 2 - 3 days.      If you have any questions or concerns, please don't hesitate to call.        Sincerely,        Arnol Casillas MD
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
## 2023-10-27 ENCOUNTER — TELEPHONE (OUTPATIENT)
Dept: GASTROENTEROLOGY | Facility: CLINIC | Age: 83
End: 2023-10-27
Payer: MEDICARE

## 2023-10-27 DIAGNOSIS — K92.1 MELENA: Primary | ICD-10-CM

## 2023-10-27 DIAGNOSIS — K55.21 ANGIODYSPLASIA OF SMALL INTESTINE, EXCEPT DUODENUM WITH BLEEDING: ICD-10-CM

## 2023-10-27 NOTE — [employer] PATIENT INSTRUCTIONS
Discharge Summary/Instructions for after Video Capsule Endoscopy  Patient Name: Margaret Hernández  Patient MRN: [id number]  Patient YOB: 1940 Tuesday, September 26, 2023  Dixon Douglas MD  This is an 83 year old male.  1.  Do Not eat or drink anything for 1 hour.  Try sips of water first.  If   tolerated, resume your regular diet or one recommended by your physician.  2.  Do not drive, operate machinery, make critical decisions, or do   activities that require coordination or balance for 24 hours.  3.   You may experience a sore throat for 24 to 48 hours.  You may use   throat lozenges or gargle with warm salt water to relieve the discomfort.  4.  Because air was put into your stomach during the procedure, you may   experience some belching.  5.  Do not use any medication containing aspirin for 10 days.  6.  Go directly to the emergency room if you notice any of the following:   Chills and/or fever over 101 F   Persistent vomiting or vomiting with blood/nasal regurgitation   Severe abdominal pain, other than gas cramps   Severe chest pain   Black, tarry stools     Your doctor recommends these additional instructions:  Your physician has recommended an upper device-assisted enteroscopy.  If you have any questions or problems, please call your physician.  EMERGENCY PHONE NUMBER: [phone]  LAB RESULTS: [phone]  Dixon Douglas MD  10/27/2023 5:26:18 PM  This report has been verified and signed electronically.  Dear patient,  As a result of recent federal legislation (The Federal Cures Act), you may   receive lab or pathology results from your procedure in your [facility]   account before your physician is able to contact you. Your physician or   their representative will relay the results to you with their   recommendations at their soonest availability.  Thank you,  [employer]

## 2023-10-27 NOTE — TELEPHONE ENCOUNTER
PillCam results finalized.  No bleeding or blood seen in the small bowel.  There were non-specific red spots and a possible small blood vessel on the surface of the jejunum.  This could be related to anemia and blood loss; however, it is difficult to be sure based on this study.    I recommend antegrade double-balloon enteroscopy for further evaluation due to recurrent anemia and need for blood transfusions.     Case request entered.  Will have staff contact patient with recommendations.

## 2023-11-20 PROBLEM — J96.01 ACUTE RESPIRATORY FAILURE WITH HYPOXIA: Status: RESOLVED | Noted: 2023-08-19 | Resolved: 2023-11-20

## 2023-11-20 PROBLEM — I21.4 NSTEMI (NON-ST ELEVATED MYOCARDIAL INFARCTION): Status: RESOLVED | Noted: 2022-10-24 | Resolved: 2023-11-20

## 2023-12-05 PROBLEM — I48.91 UNSPECIFIED ATRIAL FIBRILLATION: Status: ACTIVE | Noted: 2023-03-31

## 2024-12-04 NOTE — PLAN OF CARE
Admission SBAR Note  Situation/Background:     Patient is being transferred to [facility] ([facility]), Room# 228    Patient's Chief Complaint was depression and SI and is admitted for SI and depression.    CODE STATUS: Full  CSSRS: 3- Suicidal Thoughts with Method (without Specific Plans or Intent to Act)    ISOLATION/PRECAUTIONS: No    Is this a behavioral health patient? Yes  Has wanding been completed Yes  Are belongings secure? Yes    Called outstanding consults: Yes    STAT labs collected: Yes    Repeat Lactic Acid DUE? No    The following personal items will be sent with the patient during transfer to the floor:     All valuables: see flowsheet      ASSESSMENT:    NEURO:   ORIENTATION LEVEL: ORIENTATION LEVEL: Person, Place, Time, and Situation  Cognition: appropriate for age attention/concentration, following commands  follows multi-step complex commands/direction, and recognition of people/places  Speech: shows no evidence of impairment    Is patient impulsive? No  Is patient oriented? Yes  Do they follow commands? Yes  Is the patient ambulatory? Yes    FALL RISK? No  Interventions: Implemented/recommended use of non-skid footwear, Implemented/recommended use of fall risk identification flag to all team members, Implemented/recommended assistive devices and encouraged their use, Implemented/recommended resources for alarm system (personal alarm, bed alarm, call bell, etc.) , Implemented/recommended environmental changes (remove hazards, lower bed, improve lighting, etc.), and Implemented/recommended increased supervision/assistance    RESPIRATORY:   Is patient on oxygen? No    CARDIAC:   Is cardiac monitoring ordered? No      /GI:   Continent Bowel/Bladder? Yes  Was UA with reflex sent to lab? Yes  If no, collect and send prior to transport to inpatient area.    INTEGUMENTARY:  IS THE PATIENT UNDRESSED? Yes  ARE THERE WOUNDS PRESENT?  End of Shift Note / Update:    · Diagnosis: GI Bleed, Acute Anemia    · Status: Improving    · Patient AAO x 4; neuro check Q shift  · Pain: denies pain  · Respiratory: RA; denies respiratory distress  · Cardio: Tele NSR to Controlled Afib rhythm; denies chest pain or discomfort; Heparin gtt stopped due to Bleeding  · GI: Diet NPO p MN; denies ABD discomfort; LBM 8/21/21; Golyte given until clear; BG check ACHS 148, Stool Occult +, black  · : Voids via urinal Oliguric; HD M/W/F;  ·  Mobility: up with SBA  ·  Skin issues:None  ·  Lines, drains, and Tubes:PIV: RFA 20 ; CVL: PAM PICC 2 Lumen; HD site: SHERRIE AV graft +/+ bruit/thrill;  ·  Antibiotic therapy: PCN   ·  New events during this shift: Bowel prep completed for EGD / [place] this AM  ·  MD notifications: None  ·  Family concerns: None  .  Plan: EGD / [place]; Monitor CBC Q 8 hours; Monitor Labs

## 2025-02-11 NOTE — PROGRESS NOTES
"Subjective:       Patient ID: Margaret Hernández Jr. is a 81 y.o. male.    Vitals:  height is 5' 7" (1.702 m) and weight is 64 kg (141 lb). His temperature is 97 °F (36.1 °C). His blood pressure is 170/74 (abnormal) and his pulse is 75. His respiration is 16 and oxygen saturation is 98%.     Chief Complaint: Chest Pain (RUQ, LLQ)    Chest Pain   This is a recurrent (Pt c/o chest pain RUQ,LUQ x1 day ) problem. The current episode started in the past 7 days. The onset quality is sudden. The problem occurs constantly. The pain is present in the lateral region. The pain is at a severity of 6/10. The pain is moderate. The pain does not radiate. Associated symptoms include dizziness and shortness of breath. The pain is aggravated by nothing. He has tried nothing for the symptoms. The treatment provided no relief. Prior diagnostic workup includes chest x-ray.       Constitution: Negative.   HENT: Negative.    Cardiovascular: Positive for chest pain and sob on exertion.   Eyes: Negative.    Respiratory: Positive for chest tightness and shortness of breath.    Gastrointestinal: Negative.    Endocrine: negative.   Genitourinary: Negative.    Musculoskeletal: Negative.    Skin: Negative.    Allergic/Immunologic: Negative.  Positive for immunizations up-to-date.   Neurological: Positive for dizziness.   Hematologic/Lymphatic: Negative.    Psychiatric/Behavioral: Negative.        Objective:      Physical Exam   Constitutional: He is oriented to person, place, and time. He appears well-developed and well-nourished. He is cooperative.  Non-toxic appearance. He does not appear ill. No distress.   HENT:   Head: Normocephalic and atraumatic.   Ears:   Right Ear: Hearing, tympanic membrane, external ear and ear canal normal.   Left Ear: Hearing, tympanic membrane, external ear and ear canal normal.   Nose: Nose normal. No mucosal edema, rhinorrhea or nasal deformity. No epistaxis. Right sinus exhibits no maxillary sinus tenderness and no " frontal sinus tenderness. Left sinus exhibits no maxillary sinus tenderness and no frontal sinus tenderness.   Mouth/Throat: Uvula is midline, oropharynx is clear and moist and mucous membranes are normal. No trismus in the jaw. Normal dentition. No uvula swelling. No posterior oropharyngeal erythema.   Eyes: Conjunctivae and lids are normal. Right eye exhibits no discharge. Left eye exhibits no discharge. No scleral icterus.   Neck: Trachea normal and phonation normal. Neck supple.   Cardiovascular: Normal rate, regular rhythm, normal heart sounds, intact distal pulses and normal pulses.   Pulmonary/Chest: Effort normal and breath sounds normal. No respiratory distress.   Abdominal: Normal appearance and bowel sounds are normal. He exhibits no distension, no pulsatile midline mass and no mass. Soft. There is no abdominal tenderness.   Musculoskeletal: Normal range of motion.         General: No deformity or edema. Normal range of motion.   Neurological: He is alert and oriented to person, place, and time. He exhibits normal muscle tone. Coordination normal.   Skin: Skin is warm, dry, intact, not diaphoretic and not pale.   Psychiatric: He has a normal mood and affect. His speech is normal and behavior is normal. Judgment and thought content normal. Cognition and memory  Nursing note and vitals reviewed.    EKG - NSR, LBBB, nonspecific ST changes noted.      Assessment:       1. Chest pain, unspecified type          Plan:         Chest pain, unspecified type  -     IN OFFICE EKG 12-LEAD (to [place])  -     Refer to Emergency Dept.     YOUR CONDITION IS POTENTIALLY LIFE THREATENING.  GO TO NEAREST ER NOW FOR FURTHER EVALUATION AND TREATMENT.    Patient was offered transfer by [facility].  Patient declines ambulance transport and will go by private auto.  The risks of this decision were explained to patient.    Patient is 81 year old B male with multiple risk factors for CAD with 2 day history of chest pain.  After  consideration of above, I recommend transfer to the ED for further evaluation and treatment.  Offered and STRONGLY RECOMMENDED transfer to ED by [facility], patient refused, will go by private auto.                Emergency contact: Sister: Joy Jerez